# Patient Record
Sex: FEMALE | Race: WHITE | NOT HISPANIC OR LATINO | Employment: OTHER | URBAN - METROPOLITAN AREA
[De-identification: names, ages, dates, MRNs, and addresses within clinical notes are randomized per-mention and may not be internally consistent; named-entity substitution may affect disease eponyms.]

---

## 2017-03-24 PROBLEM — T38.0X5A IMMUNOCOMPROMISED DUE TO CORTICOSTEROIDS: Status: ACTIVE | Noted: 2017-03-24

## 2017-03-24 PROBLEM — Z79.52 IMMUNOCOMPROMISED DUE TO CORTICOSTEROIDS: Status: ACTIVE | Noted: 2017-03-24

## 2017-03-24 PROBLEM — B02.9 HERPES ZOSTER WITHOUT COMPLICATION: Status: ACTIVE | Noted: 2017-03-24

## 2017-03-24 PROBLEM — M79.602 LEFT ARM PAIN: Status: ACTIVE | Noted: 2017-03-24

## 2017-03-24 PROBLEM — D84.821 IMMUNOCOMPROMISED DUE TO CORTICOSTEROIDS: Status: ACTIVE | Noted: 2017-03-24

## 2017-07-05 PROBLEM — S92.909A FRACTURE, FOOT: Status: ACTIVE | Noted: 2017-05-01

## 2017-07-05 PROBLEM — D84.821 IMMUNOCOMPROMISED DUE TO CORTICOSTEROIDS: Status: RESOLVED | Noted: 2017-03-24 | Resolved: 2017-07-05

## 2017-07-05 PROBLEM — T38.0X5A IMMUNOCOMPROMISED DUE TO CORTICOSTEROIDS: Status: RESOLVED | Noted: 2017-03-24 | Resolved: 2017-07-05

## 2017-07-05 PROBLEM — J30.89 PERENNIAL ALLERGIC RHINITIS WITH SEASONAL VARIATION: Status: ACTIVE | Noted: 2017-07-05

## 2017-07-05 PROBLEM — B02.9 HERPES ZOSTER WITHOUT COMPLICATION: Status: RESOLVED | Noted: 2017-03-24 | Resolved: 2017-07-05

## 2017-07-05 PROBLEM — Z79.52 IMMUNOCOMPROMISED DUE TO CORTICOSTEROIDS: Status: RESOLVED | Noted: 2017-03-24 | Resolved: 2017-07-05

## 2017-07-05 PROBLEM — J30.2 PERENNIAL ALLERGIC RHINITIS WITH SEASONAL VARIATION: Status: ACTIVE | Noted: 2017-07-05

## 2017-08-04 ENCOUNTER — TELEPHONE (OUTPATIENT)
Dept: ORTHOPEDICS | Facility: CLINIC | Age: 62
End: 2017-08-04

## 2017-08-04 NOTE — TELEPHONE ENCOUNTER
----- Message from Edwige Turcios sent at 8/4/2017  3:06 PM CDT -----  Contact: self  Patient is returning call regarding appointment.  Please call patient at [phone].  Thanks!

## 2017-08-04 NOTE — TELEPHONE ENCOUNTER
----- Message from Jasen Cartwright sent at 8/4/2017  2:21 PM CDT -----  Contact: Aye  Patient states she needs surgery and was referred from Dr. Merary Arriaga Podiatrist. She has has a fractured foot since April. Dr. Arriaga is faxing records and reports, but she does have disc as well. Please call [phone] cell or office [phone]!

## 2017-08-08 ENCOUNTER — OFFICE VISIT (OUTPATIENT)
Dept: ORTHOPEDICS | Facility: CLINIC | Age: 62
End: 2017-08-08
Payer: COMMERCIAL

## 2017-08-08 VITALS
DIASTOLIC BLOOD PRESSURE: 82 MMHG | SYSTOLIC BLOOD PRESSURE: 158 MMHG | WEIGHT: 209 LBS | HEART RATE: 56 BPM | BODY MASS INDEX: 32.8 KG/M2 | HEIGHT: 67 IN

## 2017-08-08 DIAGNOSIS — M20.11 HALLUX VALGUS OF RIGHT FOOT: ICD-10-CM

## 2017-08-08 DIAGNOSIS — S99.921A PLANTAR PLATE INJURY, RIGHT, INITIAL ENCOUNTER: Primary | ICD-10-CM

## 2017-08-08 DIAGNOSIS — M77.41 METATARSALGIA OF RIGHT FOOT: ICD-10-CM

## 2017-08-08 PROCEDURE — 99999 PR PBB SHADOW E&M-EST. PATIENT-LVL III: CPT | Mod: PBBFAC,,, | Performed by: ORTHOPAEDIC SURGERY

## 2017-08-08 PROCEDURE — 99244 OFF/OP CNSLTJ NEW/EST MOD 40: CPT | Mod: S$GLB,,, | Performed by: ORTHOPAEDIC SURGERY

## 2017-08-08 NOTE — LETTER
August 15, 2017        Camila Arriaga, JUSTYN  [address]  [facility]  [facility] [address]             [facility]  [address] [facility] [address]  [facility] [address]  Phone: [phone]   Patient: Aye Montanez   MR Number: [id number]   YOB: 1955   Date of Visit: 8/8/2017       Dear Dr. Arriaga:    Thank you for referring Aye Montanez to me for evaluation. Attached you will find relevant portions of my assessment and plan of care.    If you have questions, please do not hesitate to call me. I look forward to following Aye Montanez along with you.    Sincerely,      Agapito Her MD            CC  No Recipients    Enclosure

## 2017-08-08 NOTE — Clinical Note
August 15, 2017      Iona Arriaga MD  [address] [facility] [address]           [facility]  [address] [facility] [address] [place] [address]  Phone: [phone]          Patient: Aye Montanez   MR Number: [id number]   YOB: 1955   Date of Visit: 8/8/2017       Dear Dr. Iona Arriaga:    Thank you for referring Aye Montanez to me for evaluation. Attached you will find relevant portions of my assessment and plan of care.    If you have questions, please do not hesitate to call me. I look forward to following Aye Montanez along with you.    Sincerely,    Agapito Her MD    Enclosure  CC:  No Recipients    If you would like to receive this communication electronically, please contact [email or web address] or [phone] to request more information on [employer] access.    For providers and/or their staff who would like to refer a patient to [facility], please contact us through our one-stop-shop provider referral line, [facility], at [phone].    If you feel you have received this communication in error or would no longer like to receive these types of communications, please e-mail [email or web address]

## 2017-08-09 ENCOUNTER — TELEPHONE (OUTPATIENT)
Dept: ORTHOPEDICS | Facility: CLINIC | Age: 62
End: 2017-08-09

## 2017-08-09 NOTE — TELEPHONE ENCOUNTER
----- Message from Jelena Lugo sent at 8/9/2017  9:03 AM CDT -----  Contact: self [phone]  States that she is calling to let nurse know that she would like to schedule surgery on Wednesday 08/16/17. States that she would like to speak to nurse regarding some questions that she has regarding the surgery. Please call back at [phone]//thank you acc

## 2017-08-10 RX ORDER — MUPIROCIN 20 MG/G
1 OINTMENT TOPICAL
Status: CANCELLED | OUTPATIENT
Start: 2017-08-10

## 2017-08-15 ENCOUNTER — ANESTHESIA EVENT (OUTPATIENT)
Dept: SURGERY | Facility: HOSPITAL | Age: 62
End: 2017-08-15
Payer: COMMERCIAL

## 2017-08-15 PROBLEM — M20.11 HALLUX VALGUS OF RIGHT FOOT: Status: ACTIVE | Noted: 2017-08-15

## 2017-08-15 PROBLEM — S99.921A INJURY OF PLANTAR PLATE OF RIGHT FOOT: Status: ACTIVE | Noted: 2017-05-01

## 2017-08-15 NOTE — PROGRESS NOTES
"HPI: Aye Montanez is a 62 y.o. female who was referred to me by Dr. Merary Arriaga and was seen in consultation today for right foot pain. She injured the foot in April 2017. She saw Dr. Sandoval for this previously. She rates her pain as 7/10 today and worse with shoe wear and walking Pt denies weakness, numbness, and tingling.       PAST MEDICAL/SURGICAL/FAMILY/SOCIAL/ HISTORY: REVIEWED    ALLERGIES/MEDICATIONS: REVIEWED       Review of Systems:     Constitution: Negative.   HEENT: Negative.   Eyes: Negative.   Cardiovascular: Negative.   Respiratory: Negative.   Endocrine: Negative.   Hematologic/Lymphatic: Negative.   Skin: Negative.   Musculoskeletal: Positive for right foot pain   Gastrointestinal: Negative.   Genitourinary: Negative.   Neurological: Negative.   Psychiatric/Behavioral: Negative.   Allergic/Immunologic: Negative.       PHYSICAL EXAM:  Vitals:    08/08/17 1509   BP: (!) 158/82   Pulse: (!) 56     Ht Readings from Last 1 Encounters:   08/08/17 5' 7" (1.702 m)     Wt Readings from Last 1 Encounters:   08/08/17 94.8 kg (209 lb)         GENERAL: Well developed, well nourished, no acute distress.  SKIN: Skin is intact. No atrophy, abrasions or lesions are noted.   Neurological: Normal mental status. Appropriate and conversant. Alert and oriented x 3.  GAIT: Walks with non-antalgic gait.    Right lower extremity:  2+ dorsalis pedis pulse.  Capillary refill < 3 seconds.  Normal range of motion tibiotalar and subtalar joints. Normal alignment of the forefoot and the hindfoot.  5/5 strength EHL, FHL, tibialis anterior, gastrocsoleus, tibialis posterior and peroneals. Sensation to light touch intact sural, saphenous, superficial peroneal and deep peroneal nerves. No swelling, ecchymosis. 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 Very tender to palpation at the 2nd metatarsalphalangeal joint. Mild hallux valgus.  No lymphadenopathy, no masses or tumors palpated.   " tenderness to palpation under 2nd metatarsal head.       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    Plantar plate injury, right, initial encounter Yes    Metatarsalgia of right foot     Hallux valgus of right foot         PLAN:  I spent 25 minutes in consulation with the patient today. More than half the time was spent counseling the patient on her condition and the options for operative versus non-operative care.  We discussed right 2nd toe plantar plate repair, hammertoe correction, and distal chevron osteotomy of the great toe. She is going to think about surgery and f/u when ready.

## 2017-08-16 ENCOUNTER — HOSPITAL ENCOUNTER (OUTPATIENT)
Facility: HOSPITAL | Age: 62
Discharge: HOME | End: 2017-08-16
Attending: ORTHOPAEDIC SURGERY | Admitting: ORTHOPAEDIC SURGERY
Payer: COMMERCIAL

## 2017-08-16 ENCOUNTER — HOSPITAL ENCOUNTER (OUTPATIENT)
Dept: RADIOLOGY | Facility: HOSPITAL | Age: 62
Discharge: HOME | End: 2017-08-16
Attending: ORTHOPAEDIC SURGERY | Admitting: ORTHOPAEDIC SURGERY
Payer: COMMERCIAL

## 2017-08-16 ENCOUNTER — ANESTHESIA (OUTPATIENT)
Dept: SURGERY | Facility: HOSPITAL | Age: 62
End: 2017-08-16
Payer: COMMERCIAL

## 2017-08-16 DIAGNOSIS — M77.41 METATARSALGIA OF RIGHT FOOT: ICD-10-CM

## 2017-08-16 DIAGNOSIS — S99.921A PLANTAR PLATE INJURY, RIGHT, INITIAL ENCOUNTER: Primary | ICD-10-CM

## 2017-08-16 DIAGNOSIS — M20.11 HALLUX VALGUS OF RIGHT FOOT: ICD-10-CM

## 2017-08-16 DIAGNOSIS — M79.671 RIGHT FOOT PAIN: ICD-10-CM

## 2017-08-16 PROBLEM — S99.929A PLANTAR PLATE INJURY: Status: ACTIVE | Noted: 2017-08-16

## 2017-08-16 PROCEDURE — 63600175 PHARM REV CODE 636 W HCPCS: Mod: PO | Performed by: ANESTHESIOLOGY

## 2017-08-16 PROCEDURE — 27200651 HC AIRWAY, LMA: Mod: PO | Performed by: NURSE ANESTHETIST, CERTIFIED REGISTERED

## 2017-08-16 PROCEDURE — 36000709 HC OR TIME LEV III EA ADD 15 MIN: Mod: PO | Performed by: ORTHOPAEDIC SURGERY

## 2017-08-16 PROCEDURE — 37000009 HC ANESTHESIA EA ADD 15 MINS: Mod: PO | Performed by: ORTHOPAEDIC SURGERY

## 2017-08-16 PROCEDURE — 63600175 PHARM REV CODE 636 W HCPCS: Mod: PO | Performed by: NURSE ANESTHETIST, CERTIFIED REGISTERED

## 2017-08-16 PROCEDURE — D9220A PRA ANESTHESIA: Mod: CRNA,,, | Performed by: NURSE ANESTHETIST, CERTIFIED REGISTERED

## 2017-08-16 PROCEDURE — 27201423 OPTIME MED/SURG SUP & DEVICES STERILE SUPPLY: Mod: PO | Performed by: ORTHOPAEDIC SURGERY

## 2017-08-16 PROCEDURE — 71000033 HC RECOVERY, INTIAL HOUR: Mod: PO | Performed by: ORTHOPAEDIC SURGERY

## 2017-08-16 PROCEDURE — 37000008 HC ANESTHESIA 1ST 15 MINUTES: Mod: PO | Performed by: ORTHOPAEDIC SURGERY

## 2017-08-16 PROCEDURE — 28645 REPAIR TOE DISLOCATION: CPT | Mod: 59,RT,, | Performed by: ORTHOPAEDIC SURGERY

## 2017-08-16 PROCEDURE — 28285 REPAIR OF HAMMERTOE: CPT | Mod: 51,RT,, | Performed by: ORTHOPAEDIC SURGERY

## 2017-08-16 PROCEDURE — 71000039 HC RECOVERY, EACH ADD'L HOUR: Mod: PO | Performed by: ORTHOPAEDIC SURGERY

## 2017-08-16 PROCEDURE — C1713 ANCHOR/SCREW BN/BN,TIS/BN: HCPCS | Mod: PO | Performed by: ORTHOPAEDIC SURGERY

## 2017-08-16 PROCEDURE — D9220A PRA ANESTHESIA: Mod: ANES,,, | Performed by: ANESTHESIOLOGY

## 2017-08-16 PROCEDURE — 28308 INCISION OF METATARSAL: CPT | Mod: 51,RT,, | Performed by: ORTHOPAEDIC SURGERY

## 2017-08-16 PROCEDURE — 28313 REPAIR DEFORMITY OF TOE: CPT | Mod: 51,RT,, | Performed by: ORTHOPAEDIC SURGERY

## 2017-08-16 PROCEDURE — 25000003 PHARM REV CODE 250: Mod: PO | Performed by: ANESTHESIOLOGY

## 2017-08-16 PROCEDURE — 25000003 PHARM REV CODE 250: Mod: PO | Performed by: ORTHOPAEDIC SURGERY

## 2017-08-16 PROCEDURE — 36000708 HC OR TIME LEV III 1ST 15 MIN: Mod: PO | Performed by: ORTHOPAEDIC SURGERY

## 2017-08-16 PROCEDURE — S0077 INJECTION, CLINDAMYCIN PHOSP: HCPCS | Mod: PO | Performed by: ORTHOPAEDIC SURGERY

## 2017-08-16 PROCEDURE — 76000 FLUOROSCOPY <1 HR PHYS/QHP: CPT | Mod: TC,PO

## 2017-08-16 PROCEDURE — C1769 GUIDE WIRE: HCPCS | Mod: PO | Performed by: ORTHOPAEDIC SURGERY

## 2017-08-16 PROCEDURE — 28296 COR HLX VLGS DSTL MTAR OSTEO: CPT | Mod: RT,,, | Performed by: ORTHOPAEDIC SURGERY

## 2017-08-16 PROCEDURE — S0020 INJECTION, BUPIVICAINE HYDRO: HCPCS | Mod: PO | Performed by: ORTHOPAEDIC SURGERY

## 2017-08-16 DEVICE — IMPLANTABLE DEVICE: Type: IMPLANTABLE DEVICE | Site: FOOT | Status: FUNCTIONAL

## 2017-08-16 DEVICE — SYS IMPLANT CPR VIPER: Type: IMPLANTABLE DEVICE | Site: FOOT | Status: FUNCTIONAL

## 2017-08-16 RX ORDER — MEPERIDINE HYDROCHLORIDE 50 MG/ML
12.5 INJECTION INTRAMUSCULAR; INTRAVENOUS; SUBCUTANEOUS ONCE AS NEEDED
Status: COMPLETED | OUTPATIENT
Start: 2017-08-16 | End: 2017-08-16

## 2017-08-16 RX ORDER — MUPIROCIN 20 MG/G
OINTMENT TOPICAL
Status: DISCONTINUED | OUTPATIENT
Start: 2017-08-16 | End: 2017-08-16 | Stop reason: HOSPADM

## 2017-08-16 RX ORDER — LIDOCAINE HYDROCHLORIDE 10 MG/ML
1 INJECTION, SOLUTION EPIDURAL; INFILTRATION; INTRACAUDAL; PERINEURAL ONCE AS NEEDED
Status: DISCONTINUED | OUTPATIENT
Start: 2017-08-16 | End: 2017-08-16 | Stop reason: HOSPADM

## 2017-08-16 RX ORDER — MEPERIDINE HYDROCHLORIDE 50 MG/ML
12.5 INJECTION INTRAMUSCULAR; INTRAVENOUS; SUBCUTANEOUS ONCE AS NEEDED
Status: DISCONTINUED | OUTPATIENT
Start: 2017-08-16 | End: 2017-08-16 | Stop reason: HOSPADM

## 2017-08-16 RX ORDER — FERROUS SULFATE, DRIED 160(50) MG
1 TABLET, EXTENDED RELEASE ORAL DAILY
COMMUNITY
End: 2022-01-12

## 2017-08-16 RX ORDER — ONDANSETRON 2 MG/ML
INJECTION INTRAMUSCULAR; INTRAVENOUS
Status: DISCONTINUED | OUTPATIENT
Start: 2017-08-16 | End: 2017-08-16

## 2017-08-16 RX ORDER — ACETAMINOPHEN 500 MG
1000 TABLET ORAL EVERY 6 HOURS PRN
COMMUNITY

## 2017-08-16 RX ORDER — LIDOCAINE HYDROCHLORIDE 20 MG/ML
INJECTION, SOLUTION INFILTRATION; PERINEURAL
Status: DISCONTINUED | OUTPATIENT
Start: 2017-08-16 | End: 2017-08-16 | Stop reason: HOSPADM

## 2017-08-16 RX ORDER — HYDROCODONE BITARTRATE AND ACETAMINOPHEN 10; 325 MG/1; MG/1
1 TABLET ORAL EVERY 4 HOURS PRN
Qty: 42 TABLET | Refills: 0 | Status: SHIPPED | OUTPATIENT
Start: 2017-08-16 | End: 2017-09-01 | Stop reason: SDUPTHER

## 2017-08-16 RX ORDER — MIDAZOLAM HYDROCHLORIDE 1 MG/ML
INJECTION, SOLUTION INTRAMUSCULAR; INTRAVENOUS
Status: DISCONTINUED | OUTPATIENT
Start: 2017-08-16 | End: 2017-08-16

## 2017-08-16 RX ORDER — BUPIVACAINE HYDROCHLORIDE 5 MG/ML
INJECTION, SOLUTION EPIDURAL; INTRACAUDAL
Status: DISCONTINUED | OUTPATIENT
Start: 2017-08-16 | End: 2017-08-16 | Stop reason: HOSPADM

## 2017-08-16 RX ORDER — PROPOFOL 10 MG/ML
VIAL (ML) INTRAVENOUS
Status: DISCONTINUED | OUTPATIENT
Start: 2017-08-16 | End: 2017-08-16

## 2017-08-16 RX ORDER — DEXAMETHASONE SODIUM PHOSPHATE 4 MG/ML
8 INJECTION, SOLUTION INTRA-ARTICULAR; INTRALESIONAL; INTRAMUSCULAR; INTRAVENOUS; SOFT TISSUE
Status: COMPLETED | OUTPATIENT
Start: 2017-08-16 | End: 2017-08-16

## 2017-08-16 RX ORDER — FENTANYL CITRATE 50 UG/ML
INJECTION, SOLUTION INTRAMUSCULAR; INTRAVENOUS
Status: DISCONTINUED | OUTPATIENT
Start: 2017-08-16 | End: 2017-08-16

## 2017-08-16 RX ORDER — OXYCODONE HYDROCHLORIDE 5 MG/1
5 TABLET ORAL ONCE AS NEEDED
Status: DISCONTINUED | OUTPATIENT
Start: 2017-08-17 | End: 2017-08-16 | Stop reason: HOSPADM

## 2017-08-16 RX ORDER — SODIUM CHLORIDE 0.9 % (FLUSH) 0.9 %
3 SYRINGE (ML) INJECTION
Status: DISCONTINUED | OUTPATIENT
Start: 2017-08-16 | End: 2017-08-16 | Stop reason: HOSPADM

## 2017-08-16 RX ORDER — MUPIROCIN 20 MG/G
1 OINTMENT TOPICAL
Status: COMPLETED | OUTPATIENT
Start: 2017-08-16 | End: 2017-08-16

## 2017-08-16 RX ORDER — HYDROMORPHONE HYDROCHLORIDE 2 MG/ML
0.2 INJECTION, SOLUTION INTRAMUSCULAR; INTRAVENOUS; SUBCUTANEOUS EVERY 5 MIN PRN
Status: DISCONTINUED | OUTPATIENT
Start: 2017-08-16 | End: 2017-08-16 | Stop reason: HOSPADM

## 2017-08-16 RX ORDER — ONDANSETRON 4 MG/1
8 TABLET, ORALLY DISINTEGRATING ORAL EVERY 8 HOURS PRN
Qty: 20 TABLET | Refills: 1 | Status: SHIPPED | OUTPATIENT
Start: 2017-08-16 | End: 2017-10-20

## 2017-08-16 RX ORDER — ACETAMINOPHEN 10 MG/ML
INJECTION, SOLUTION INTRAVENOUS
Status: DISCONTINUED | OUTPATIENT
Start: 2017-08-16 | End: 2017-08-16

## 2017-08-16 RX ORDER — SODIUM CHLORIDE, SODIUM LACTATE, POTASSIUM CHLORIDE, CALCIUM CHLORIDE 600; 310; 30; 20 MG/100ML; MG/100ML; MG/100ML; MG/100ML
INJECTION, SOLUTION INTRAVENOUS CONTINUOUS
Status: DISCONTINUED | OUTPATIENT
Start: 2017-08-16 | End: 2017-08-16 | Stop reason: HOSPADM

## 2017-08-16 RX ORDER — FENTANYL CITRATE 50 UG/ML
25 INJECTION, SOLUTION INTRAMUSCULAR; INTRAVENOUS EVERY 5 MIN PRN
Status: DISCONTINUED | OUTPATIENT
Start: 2017-08-16 | End: 2017-08-16 | Stop reason: HOSPADM

## 2017-08-16 RX ORDER — LIDOCAINE HCL/PF 100 MG/5ML
SYRINGE (ML) INTRAVENOUS
Status: DISCONTINUED | OUTPATIENT
Start: 2017-08-16 | End: 2017-08-16

## 2017-08-16 RX ORDER — DIPHENHYDRAMINE HYDROCHLORIDE 50 MG/ML
25 INJECTION INTRAMUSCULAR; INTRAVENOUS EVERY 6 HOURS PRN
Status: DISCONTINUED | OUTPATIENT
Start: 2017-08-16 | End: 2017-08-16 | Stop reason: HOSPADM

## 2017-08-16 RX ADMIN — DEXAMETHASONE SODIUM PHOSPHATE 8 MG: 4 INJECTION, SOLUTION INTRAMUSCULAR; INTRAVENOUS at 11:08

## 2017-08-16 RX ADMIN — LIDOCAINE HYDROCHLORIDE 75 MG: 20 INJECTION PARENTERAL at 12:08

## 2017-08-16 RX ADMIN — FENTANYL CITRATE 50 MCG: 50 INJECTION, SOLUTION INTRAMUSCULAR; INTRAVENOUS at 12:08

## 2017-08-16 RX ADMIN — SODIUM CHLORIDE, SODIUM LACTATE, POTASSIUM CHLORIDE, AND CALCIUM CHLORIDE: .6; .31; .03; .02 INJECTION, SOLUTION INTRAVENOUS at 11:08

## 2017-08-16 RX ADMIN — DEXTROSE 900 MG: 50 INJECTION, SOLUTION INTRAVENOUS at 12:08

## 2017-08-16 RX ADMIN — PROPOFOL 190 MG: 10 INJECTION, EMULSION INTRAVENOUS at 12:08

## 2017-08-16 RX ADMIN — ONDANSETRON 4 MG: 2 INJECTION, SOLUTION INTRAMUSCULAR; INTRAVENOUS at 01:08

## 2017-08-16 RX ADMIN — MEPERIDINE HYDROCHLORIDE 12.5 MG: 50 INJECTION INTRAMUSCULAR; INTRAVENOUS; SUBCUTANEOUS at 03:08

## 2017-08-16 RX ADMIN — ACETAMINOPHEN 1000 MG: 10 INJECTION, SOLUTION INTRAVENOUS at 12:08

## 2017-08-16 RX ADMIN — MUPIROCIN 1 G: 20 OINTMENT TOPICAL at 11:08

## 2017-08-16 NOTE — BRIEF OP NOTE
DATE:  8/16/2017   TIME: 2:10 PM      PATIENT NAME: Aye Montanez     PRE-OPERATIVE DIAGNOSIS: 1) Right 2nd metatarsalgia  2) Right 2nd toe hammertoe 3) Right 2nd toe plantar plate rupture 4) Right 2nd toe  metatarsalphalangeal joint dislocation 5) Right hallux valgus     POST-OPERATIVE DIAGNOSIS:  1) Right 2nd metatarsalgia  2) Right 2nd toe hammertoe 3) Right 2nd toe plantar plate rupture 4) Right 2nd toe  metatarsalphalangeal joint dislocation 5) Right hallux valgus     PROCEDURE: 1)  Right 2nd metatarsal weil osteotomy 2) Right 2nd toe hammertoe correction 3) Open reduction internal fixation of right 2nd toe  metatarsalphalangeal joint dislocation 4) Repair Right 2nd toe plantar plate rupture 5) Right 1st metatarsal distal chevron osteotomy with bunionectomy      SURGEON: Agapito Her MD     ANESTHESIA TYPE: Ankle block and LMA.     SPECIMENS SENT: NONE      COMPLICATIONS: NONE      BLOOD LOSS: < 5 cc     ASSISTANT: EDUARDO Ferrell

## 2017-08-16 NOTE — TRANSFER OF CARE
"Anesthesia Transfer of Care Note    Patient: Aye Montanez    Procedure(s) Performed: Procedure(s) (LRB):  REPAIR-HAMMER TOE (2nd) (Right)  OSTEOTOMY-CHEVRON (1st) (Right)  BUNIONECTOMY (Right)  RECONSTRUCTION-PLANTAR-TOE (2nd) (Right)  OSTEOTOMY-METATARSAL (2nd weil) (Right)    Patient location: PACU    Anesthesia Type: general    Transport from OR: Transported from OR on room air with adequate spontaneous ventilation    Post pain: adequate analgesia    Post assessment: no apparent anesthetic complications and tolerated procedure well    Post vital signs: stable    Level of consciousness: awake and alert    Nausea/Vomiting: no nausea/vomiting    Complications: none    Transfer of care protocol was followed      Last vitals:   Visit Vitals  Ht 5' 7" (1.702 m)   Wt 93.4 kg (206 lb)   Breastfeeding? No   BMI 32.26 kg/m²     "

## 2017-08-16 NOTE — OP NOTE
DATE:  8/16/2017   TIME: 2:10 PM      PATIENT NAME: Aye Montanez     PRE-OPERATIVE DIAGNOSIS: 1) Right 2nd metatarsalgia  2) Right 2nd toe hammertoe 3) Right 2nd toe plantar plate rupture 4) Right 2nd toe  metatarsalphalangeal joint dislocation 5) Right hallux valgus     POST-OPERATIVE DIAGNOSIS:  1) Right 2nd metatarsalgia  2) Right 2nd toe hammertoe 3) Right 2nd toe plantar plate rupture 4) Right 2nd toe  metatarsalphalangeal joint dislocation 5) Right hallux valgus     PROCEDURE: 1)  Right 2nd metatarsal weil osteotomy 2) Right 2nd toe hammertoe correction 3) Open reduction internal fixation of right 2nd toe  metatarsalphalangeal joint dislocation 4) Repair Right 2nd toe plantar plate rupture 5) Right 1st metatarsal distal chevron osteotomy with bunionectomy      SURGEON: Agapito Her MD     ANESTHESIA TYPE: Ankle block and LMA.     SPECIMENS SENT: NONE      COMPLICATIONS: NONE      BLOOD LOSS: < 5 cc     ASSISTANT: EDUARDO Ferrell     Procedure in detail: After appropriate informed consent was obtained the patient was taken to the OR and placed in the supine position on the operating table. The right lower extremity was prepped and draped in the usual sterile fashion. Tourniquet was raised to 300 mmHg after esmarch exsanguination of the limb.  A 5-cm incision was made longitudinally using a #15 blade overlying the dorsum of the 2nd webpsace.    Next  extensor digitorum longus tendon was lengthening in a Z-type fashion.  Dorsal capsulotomy was performed at the 2nd  metatarsalphalangeal joint which was dislocated dorsally. Weil osteotomy was performed at the 2nd metatarsal neck using a sagittal saw at a 60 degree angle and completed with an osteotome. The 2nd metatarsal neck was shortened and Integra 14 mm x 2.0 mm break away screw was used for fixation of the osteotomy.  A Mcglamry retractor was used to elevate the plantar soft tissues and expose the plantar plate. There as a large complete full  thickness, horizontal tear of the plantar plate of the 2nd  metatarsalphalangeal joint.    Next two 1.6 k-wires were placed. One was placed in the metatarsal neck and one was placed in the proximal phalanx.  The pin distractor was then used to distract the joint and visualize the flexor tendons and the plantar plate for repair.  The Arthrex Viper suture passer was used to pass two 2.0 fiber wire sutures through the medial and lateral ends of the plantar plate.  Next two small drill holes were made in the base of the proximal phalanx using a 0.062 k-wire. Then the sutures were crossed over pulled through one from each side.       Next a separate skin incision was made over the dorsum of the 2nd toe.  The Proximal interphalangeal joint was exposed and the extensor tendon was split longitudinally. Proximal interphalangeal joint resection arthroplasty was performed using a sagittal saw.  A medium  [employer] toe tac was used to stabilize the PIPJ resection arthroplasty. The proximal interphalangeal joint was pinned across the 2nd metatarsalphalangeal joint using a 0.045 K-wire in a retrograde fashion in order to reduce the dislocated 2nd metatarso-phalangeal joint. There was very good alignment of the joints and good position of wire on AP/LAT/Oblique views of the foot under fluoroscopy.  The K-wire was cut and jurgan's ball was placed.   Next the sutures were tied down at the dorsum of the proximal phalanx to complete the plantar plate repair.   Attention was turned to the great toe which was rubbing the 2nd toe due to hallux valgus deformity.  A 4 cm incision was made over the dorsomedial aspect of  the great toe. The medial eminence was removed using sagittal saw. The osteotomy level was marked and distal chevron osteotomy was performed using a sagittal saw and 1/4 inch osteotome. One Arthrex compression screw was placed across the osteotomy with good compression.      There was  good alignment correction of the  deformities and cascade of the metatarsals and good position of the hardware on AP/LAT/Oblique views of the foot under fluoroscopy.    The capsule of the joint of the 2nd  metatarsalphalangeal joint was repaired using 2.0 fiberwire interrupted sutures.    The incision was irrigated with normal saline. The tourniquet was let down, adequate hemostasis was achieved using bovie cautery. The deep layer was re-approximated using 3.0-monocyrl in an interrupted fashion. The subcutaneous layer was re-approximated using 3.0-monocyrl in an interrupted fashion. The skin incisions were re-approximated using 3.0 nylon in a running fashion. Sterile dressing using xeroform, bacitracin, 4x8s, ABD, cast padding, posterior splint and ace wrap were placed. Patient tolerated the procedure well without complications.  I was present and scrubbed for the entire case.

## 2017-08-16 NOTE — DISCHARGE INSTRUCTIONS
FOOT SURGERY  After surgery:    DOS:   Keep leg elevated until first post operative visit   Keep dressing clean and dry DO NOT CHANGE BANDAGES   Advanced diet as tolerated.    Check circulation frequently in toes by pressing down on toenail. Nail should turn white and then pink when released.   May walk on foot to go to the bathroom only DO NOT WALK WITHOUT SHOE   Wear surgical shoe. May remove shoe to sleep.   Resume home medications    DONT:   Do not remove your dressing   Do not get dressing wet.   No driving until released by MD   DO NOT TAKE ADDITIONAL TYLENOL/ACETAMINOPHEN WHILE TAKING NARCOTIC PAIN MEDICATION THAT CONTAINS TYLENOL/ACETAMINOPHEN.    CALL PHYSICIAN FOR:   Pain, burning, or numbness of the toes not relieved by elevation of the leg.   Pale or cold toes; bluish nail beds.   Redness, swelling, or bleeding.   Fever> 101   Drainage (pus) from the puncture sites   Pain unrelieved by pain medication    FOR EMERGENCIES CONTACT YOUR PHYSICIAN -[phone]

## 2017-08-16 NOTE — H&P
"HPI: Aye Montanez is a 62 y.o. female who was referred to me by Dr. Merary Arriaga and was seen in consultation today for right foot pain. She injured the foot in April 2017. She saw Dr. Sandoval for this previously. She rates her pain as 7/10 today and worse with shoe wear and walking Pt denies weakness, numbness, and tingling.        PAST MEDICAL/SURGICAL/FAMILY/SOCIAL/ HISTORY: REVIEWED    ALLERGIES/MEDICATIONS: REVIEWED         Review of Systems:     Constitution: Negative.   HEENT: Negative.   Eyes: Negative.   Cardiovascular: Negative.   Respiratory: Negative.   Endocrine: Negative.   Hematologic/Lymphatic: Negative.   Skin: Negative.   Musculoskeletal: Positive for right foot pain   Gastrointestinal: Negative.   Genitourinary: Negative.   Neurological: Negative.   Psychiatric/Behavioral: Negative.   Allergic/Immunologic: Negative.         PHYSICAL EXAM:      Vitals:     08/08/17 1509   BP: (!) 158/82   Pulse: (!) 56          Ht Readings from Last 1 Encounters:   08/08/17 5' 7" (1.702 m)          Wt Readings from Last 1 Encounters:   08/08/17 94.8 kg (209 lb)            GENERAL: Well developed, well nourished, no acute distress.  SKIN: Skin is intact. No atrophy, abrasions or lesions are noted.   Neurological: Normal mental status. Appropriate and conversant. Alert and oriented x 3.  GAIT: Walks with non-antalgic gait.     Right lower extremity:  2+ dorsalis pedis pulse.  Capillary refill < 3 seconds.  Normal range of motion tibiotalar and subtalar joints. Normal alignment of the forefoot and the hindfoot.  5/5 strength EHL, FHL, tibialis anterior, gastrocsoleus, tibialis posterior and peroneals. Sensation to light touch intact sural, saphenous, superficial peroneal and deep peroneal nerves. No swelling, ecchymosis. 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 Very tender to palpation at the 2nd metatarsalphalangeal joint. Mild hallux valgus.  No lymphadenopathy, no " masses or tumors palpated.   tenderness to palpation under 2nd metatarsal head.         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    Plantar plate injury, right, initial encounter Yes    Metatarsalgia of right foot      Hallux valgus of right foot          PLAN:  I spent 25 minutes in consulation with the patient today. More than half the time was spent counseling the patient on her condition and the options for operative versus non-operative care.  We discussed right 2nd toe plantar plate repair, hammertoe correction, and distal chevron osteotomy of the great toe. She is going to think about surgery and f/u when ready.

## 2017-08-16 NOTE — ANESTHESIA POSTPROCEDURE EVALUATION
"Anesthesia Post Evaluation    Patient: Aye Montanez    Procedure(s) Performed: Procedure(s) (LRB):  REPAIR-HAMMER TOE (2nd) (Right)  OSTEOTOMY-CHEVRON (1st) (Right)  BUNIONECTOMY (Right)  RECONSTRUCTION-PLANTAR-TOE (2nd) (Right)  OSTEOTOMY-METATARSAL (2nd weil) (Right)    Final Anesthesia Type: general  Patient location during evaluation: PACU  Patient participation: Yes- Able to Participate  Level of consciousness: awake and alert and oriented  Post-procedure vital signs: reviewed and stable  Pain management: adequate  Airway patency: patent  PONV status at discharge: No PONV  Anesthetic complications: no      Cardiovascular status: blood pressure returned to baseline  Respiratory status: unassisted, spontaneous ventilation and room air  Hydration status: euvolemic  Follow-up not needed.        Visit Vitals  BP (!) 187/77   Pulse 88   Temp 36.1 °C (97 °F) (Temporal)   Resp 18   Ht 5' 7" (1.702 m)   Wt 93.4 kg (206 lb)   SpO2 98%   Breastfeeding? No   BMI 32.26 kg/m²       Pain/Gigi Score: Pain Assessment Performed: Yes (8/16/2017  2:33 PM)  Presence of Pain: complains of pain/discomfort (8/16/2017  3:11 PM)  Pain Rating Prior to Med Admin: 10 (8/16/2017  3:11 PM)  Gigi Score: 8 (8/16/2017  2:33 PM)      "

## 2017-08-16 NOTE — ANESTHESIA PREPROCEDURE EVALUATION
08/16/2017  Aye Montanez is a 62 y.o., female.    Anesthesia Evaluation    I have reviewed the Patient Summary Reports.    I have reviewed the Nursing Notes.   I have reviewed the Medications.     Review of Systems  Anesthesia Hx:  No problems with previous Anesthesia Denies Hx of Anesthetic complications  Personal Hx of Anesthesia complications  Paradoxical Response To Benzodiazapines (NO VERSED)   Social:  Non-Smoker    Cardiovascular:   Denies Hypertension.  Denies MI.  Denies CAD.    Denies CABG/stent.   Denies Angina. SANDOVAL    Pulmonary:   Denies COPD.  Denies Asthma. Shortness of breath  Denies Recent URI.    Renal/:   Denies Chronic Renal Disease.     Hepatic/GI:   Hiatal Hernia, Denies GERD. Denies Liver Disease.    Neurological:   Denies TIA. Denies CVA. Neuromuscular Disease,  Denies Seizures.    Endocrine:   Denies Diabetes. Denies Hypothyroidism.    Psych:   Denies Psychiatric History.          Physical Exam  General:  Obesity    Airway/Jaw/Neck:  Airway Findings: Mouth Opening: Normal Tongue: Normal  General Airway Assessment: Adult, Good  Mallampati: II  Improves to II with phonation.  TM Distance: 4-6 cm      Dental:  Dental Findings: In tact   Chest/Lungs:  Chest/Lungs Findings: Clear to auscultation, Normal Respiratory Rate     Heart/Vascular:  Heart Findings: Rate: Normal  Rhythm: Regular Rhythm  Sounds: Normal  Heart murmur: negative       Mental Status:  Mental Status Findings:  Cooperative, Alert and Oriented         Anesthesia Plan  Type of Anesthesia, risks & benefits discussed:  Anesthesia Type:  general  Patient's Preference:   Intra-op Monitoring Plan:   Intra-op Monitoring Plan Comments:   Post Op Pain Control Plan:   Post Op Pain Control Plan Comments:   Induction:   IV  Beta Blocker:  Patient is not currently on a Beta-Blocker (No further documentation required).       Informed  Consent: Patient understands risks and agrees with Anesthesia plan.  Questions answered. Anesthesia consent signed with patient.  ASA Score: 2     Day of Surgery Review of History & Physical: I have interviewed and examined the patient. I have reviewed the patient's H&P dated:  There are no significant changes.  H&P update referred to the surgeon.         Ready For Surgery From Anesthesia Perspective.

## 2017-08-17 ENCOUNTER — TELEPHONE (OUTPATIENT)
Dept: ORTHOPEDICS | Facility: CLINIC | Age: 62
End: 2017-08-17

## 2017-08-17 ENCOUNTER — NURSE TRIAGE (OUTPATIENT)
Dept: ADMINISTRATIVE | Facility: CLINIC | Age: 62
End: 2017-08-17

## 2017-08-17 VITALS
HEART RATE: 72 BPM | HEIGHT: 67 IN | DIASTOLIC BLOOD PRESSURE: 67 MMHG | OXYGEN SATURATION: 99 % | SYSTOLIC BLOOD PRESSURE: 145 MMHG | BODY MASS INDEX: 32.33 KG/M2 | RESPIRATION RATE: 18 BRPM | TEMPERATURE: 97 F | WEIGHT: 206 LBS

## 2017-08-17 NOTE — TELEPHONE ENCOUNTER
----- Message from Suzan Guerrero sent at 8/17/2017 11:47 AM CDT -----  Contact: [phone]  Patient is requesting a call back from the nurse stated her pain is not subsidizing.  Please call the patient upon request at phone number [phone].

## 2017-08-17 NOTE — TELEPHONE ENCOUNTER
"  Reason for Disposition   [1] SEVERE post-op pain (e.g., excruciating, pain scale 8-10) AND [2] not controlled with pain medications    Answer Assessment - Initial Assessment Questions  1. SYMPTOM: "What's the main symptom you're concerned about?" (e.g., pain, fever, vomiting)    Pain  2. ONSET: "When did ________  start?"      Yesterday  3. SURGERY: "What surgery was performed?"      Right foot surgery  4. DATE of SURGERY: "When was surgery performed?"      Yesterday  5. ANESTHESIA: " What type of anesthesia did you have?" (e.g., general, spinal, epidural, local)      General  6. PAIN: "Is there any pain?" If so, ask: "How bad is it?"  (Scale 1-10; or mild, moderate, severe)      Yes. 10/10. Last took Norco at 8:30a. Last took Tylenol 500 mg at 11a.  7. FEVER: "Do you have a fever?" If so, ask: "What is your temperature, how was it measured, and when did it start?"      No  8. VOMITING: "Is there any vomiting?" If yes, ask: "How many times?"      No  9. BLEEDING: "Is there any bleeding?" If so, ask: "How much?" and "Where?"      No  10. OTHER SYMPTOMS: "Do you have any other symptoms?" (e.g., drainage from wound, painful urination, constipation)        No    Protocols used: ST POST-OP SYMPTOMS AND [id number]    Patient transferred to Dr Her's office.  "

## 2017-08-17 NOTE — TELEPHONE ENCOUNTER
Spoke with pt, pt stated she is having increased pain when her foot is elevated, and is it ok to alternate tylenol inbetween the norco.Informed pt she just needs to keep foot elevated, as swelling can increase the pain. Also informed pt the first day postop is the worse day.Pt stated if she does not get any relief from pain she will call back tomorrow.

## 2017-08-30 DIAGNOSIS — M77.41 METATARSALGIA OF RIGHT FOOT: ICD-10-CM

## 2017-08-30 DIAGNOSIS — S99.921A PLANTAR PLATE INJURY, RIGHT, INITIAL ENCOUNTER: Primary | ICD-10-CM

## 2017-09-01 ENCOUNTER — HOSPITAL ENCOUNTER (OUTPATIENT)
Dept: RADIOLOGY | Facility: HOSPITAL | Age: 62
Discharge: HOME | End: 2017-09-01
Attending: ORTHOPAEDIC SURGERY
Payer: COMMERCIAL

## 2017-09-01 ENCOUNTER — OFFICE VISIT (OUTPATIENT)
Dept: ORTHOPEDICS | Facility: CLINIC | Age: 62
End: 2017-09-01
Payer: COMMERCIAL

## 2017-09-01 VITALS
WEIGHT: 206 LBS | HEIGHT: 67 IN | BODY MASS INDEX: 32.33 KG/M2 | DIASTOLIC BLOOD PRESSURE: 79 MMHG | HEART RATE: 68 BPM | SYSTOLIC BLOOD PRESSURE: 131 MMHG

## 2017-09-01 DIAGNOSIS — M20.11 HALLUX VALGUS OF RIGHT FOOT: ICD-10-CM

## 2017-09-01 DIAGNOSIS — M77.41 METATARSALGIA OF RIGHT FOOT: ICD-10-CM

## 2017-09-01 DIAGNOSIS — S99.921D INJURY OF PLANTAR PLATE OF RIGHT FOOT, SUBSEQUENT ENCOUNTER: Primary | ICD-10-CM

## 2017-09-01 DIAGNOSIS — S99.921A PLANTAR PLATE INJURY, RIGHT, INITIAL ENCOUNTER: ICD-10-CM

## 2017-09-01 PROCEDURE — 99999 PR PBB SHADOW E&M-EST. PATIENT-LVL III: CPT | Mod: PBBFAC,,, | Performed by: ORTHOPAEDIC SURGERY

## 2017-09-01 PROCEDURE — 73630 X-RAY EXAM OF FOOT: CPT | Mod: TC,PO,RT

## 2017-09-01 PROCEDURE — 73630 X-RAY EXAM OF FOOT: CPT | Mod: 26,RT,, | Performed by: RADIOLOGY

## 2017-09-01 PROCEDURE — 99024 POSTOP FOLLOW-UP VISIT: CPT | Mod: S$GLB,,, | Performed by: ORTHOPAEDIC SURGERY

## 2017-09-01 NOTE — TELEPHONE ENCOUNTER
----- Message from Carter Hidalgo sent at 9/1/2017  3:12 PM CDT -----  Contact: self  [phone]  Patient called stating the pharmacy - [facility] in [place] has not received rx norco.Thanks!

## 2017-09-01 NOTE — TELEPHONE ENCOUNTER
Returned pts call, no answer.Left voicemail informing pt the script request has been sent to , and the script will be sent later this evening.

## 2017-09-02 ENCOUNTER — NURSE TRIAGE (OUTPATIENT)
Dept: ADMINISTRATIVE | Facility: CLINIC | Age: 62
End: 2017-09-02

## 2017-09-02 NOTE — TELEPHONE ENCOUNTER
Reason for Disposition   Caller requesting a NON-URGENT new prescription or refill and triager unable to refill per unit policy    Protocols used: ST MEDICATION QUESTION CALL-A-    Patient called for refill on Norco. Triager unable to fill per policy. Education completed per [facility] On Call Care Advice including  Calling Dr. Montiel.  Patient/ Caregiver verbalize understanding.

## 2017-09-05 ENCOUNTER — TELEPHONE (OUTPATIENT)
Dept: ORTHOPEDICS | Facility: CLINIC | Age: 62
End: 2017-09-05

## 2017-09-05 RX ORDER — HYDROCODONE BITARTRATE AND ACETAMINOPHEN 10; 325 MG/1; MG/1
1 TABLET ORAL EVERY 4 HOURS PRN
Qty: 42 TABLET | Refills: 0 | Status: SHIPPED | OUTPATIENT
Start: 2017-09-05 | End: 2017-11-22

## 2017-09-05 NOTE — TELEPHONE ENCOUNTER
----- Message from Kelsie Webb sent at 9/1/2017  4:15 PM CDT -----  Contact: Vanna/ Natalia [phone]  Please call regarding this patient's prescription for Norco.  It was not sent to [facility].  Please call patient.

## 2017-09-06 NOTE — PROGRESS NOTES
Subjective:      Patient ID: Aye Montanez is a 62 y.o. female.    Chief Complaint: Post-op Evaluation of the Right Foot and Post-op Evaluation (s/p 2nd weil; 2nd hammertoe; ORIF 2nd toe; 2nd plantar plate repair; 1st bunionectomy 8/16/17)    Doing well today s/p right 2nd toe hammertoe correction with plantar plate repair and bunion correction. She rates her pain as 2/10 today.   Social History     Occupational History    Not on file.     Social History Main Topics    Smoking status: Never Smoker    Smokeless tobacco: Never Used    Alcohol use Yes      Comment: rarely    Drug use: No    Sexual activity: Yes     Partners: Male            Objective:    Ortho Exam     RLE: neurovascularly intact, incisions well healed. Good alignment. No signs of infection. Pin site Clean, dry, intact.  Assessment:       1. Injury of plantar plate of right foot, subsequent encounter    2. Hallux valgus of right foot          Plan:       Sutures were removed today and steri-strips were placed. Weight bearing as tolerated on heel only. Will keep pin 6 weeks total due to severity of plantar plate injury and poor quality tissue in that area. Toe spacer. F/u 2 weeks with xray of the right foot.

## 2017-09-14 DIAGNOSIS — S99.921D INJURY OF PLANTAR PLATE OF RIGHT FOOT, SUBSEQUENT ENCOUNTER: Primary | ICD-10-CM

## 2017-09-15 ENCOUNTER — OFFICE VISIT (OUTPATIENT)
Dept: ORTHOPEDICS | Facility: CLINIC | Age: 62
End: 2017-09-15
Payer: COMMERCIAL

## 2017-09-15 ENCOUNTER — HOSPITAL ENCOUNTER (OUTPATIENT)
Dept: RADIOLOGY | Facility: HOSPITAL | Age: 62
Discharge: HOME | End: 2017-09-15
Attending: ORTHOPAEDIC SURGERY
Payer: COMMERCIAL

## 2017-09-15 VITALS
HEIGHT: 67 IN | DIASTOLIC BLOOD PRESSURE: 83 MMHG | SYSTOLIC BLOOD PRESSURE: 138 MMHG | WEIGHT: 206 LBS | BODY MASS INDEX: 32.33 KG/M2 | HEART RATE: 76 BPM

## 2017-09-15 DIAGNOSIS — M20.11 HALLUX VALGUS OF RIGHT FOOT: ICD-10-CM

## 2017-09-15 DIAGNOSIS — S99.921D INJURY OF PLANTAR PLATE OF RIGHT FOOT, SUBSEQUENT ENCOUNTER: ICD-10-CM

## 2017-09-15 DIAGNOSIS — S99.921D INJURY OF PLANTAR PLATE OF RIGHT FOOT, SUBSEQUENT ENCOUNTER: Primary | ICD-10-CM

## 2017-09-15 PROCEDURE — 73630 X-RAY EXAM OF FOOT: CPT | Mod: TC,PO,RT

## 2017-09-15 PROCEDURE — 99999 PR PBB SHADOW E&M-EST. PATIENT-LVL III: CPT | Mod: PBBFAC,,, | Performed by: ORTHOPAEDIC SURGERY

## 2017-09-15 PROCEDURE — 99024 POSTOP FOLLOW-UP VISIT: CPT | Mod: S$GLB,,, | Performed by: ORTHOPAEDIC SURGERY

## 2017-09-15 PROCEDURE — 73630 X-RAY EXAM OF FOOT: CPT | Mod: 26,RT,, | Performed by: RADIOLOGY

## 2017-09-19 PROBLEM — M79.602 LEFT ARM PAIN: Status: RESOLVED | Noted: 2017-03-24 | Resolved: 2017-09-19

## 2017-09-19 PROBLEM — S99.929A PLANTAR PLATE INJURY: Status: RESOLVED | Noted: 2017-08-16 | Resolved: 2017-09-19

## 2017-09-19 NOTE — PROGRESS NOTES
Subjective:      Patient ID: Aye Montanez is a 62 y.o. female.    Chief Complaint: Post-op Evaluation of the Right Foot and Post-op Evaluation (s/p 2nd hammertoe; plantar plate repair with bunion correction 8/16/17)    Doing well today s/p right 2nd toe hammertoe correction with plantar plate repair and bunion correction. She rates her pain as 1/10 today.   Social History     Occupational History    Not on file.     Social History Main Topics    Smoking status: Never Smoker    Smokeless tobacco: Never Used    Alcohol use Yes      Comment: rarely    Drug use: No    Sexual activity: Yes     Partners: Male            Objective:    Ortho Exam     RLE: neurovascularly intact, incisions well healed. Good alignment. No signs of infection. Pin site Clean, dry, intact.  Assessment:         s/p right 2nd toe hammertoe correction with plantar plate repair and bunion correction  Plan:       Sutures were removed today and steri-strips were placed. Weight bearing as tolerated on heel only. Will keep pin 6 weeks total due to severity of plantar plate injury and poor quality tissue in that area. Toe spacer. F/u 2 weeks with xray of the right foot.

## 2017-09-25 DIAGNOSIS — S99.921D INJURY OF PLANTAR PLATE OF RIGHT FOOT, SUBSEQUENT ENCOUNTER: Primary | ICD-10-CM

## 2017-09-29 ENCOUNTER — HOSPITAL ENCOUNTER (OUTPATIENT)
Dept: RADIOLOGY | Facility: HOSPITAL | Age: 62
Discharge: HOME | End: 2017-09-29
Attending: ORTHOPAEDIC SURGERY
Payer: COMMERCIAL

## 2017-09-29 ENCOUNTER — OFFICE VISIT (OUTPATIENT)
Dept: ORTHOPEDICS | Facility: CLINIC | Age: 62
End: 2017-09-29
Payer: COMMERCIAL

## 2017-09-29 VITALS
HEART RATE: 60 BPM | BODY MASS INDEX: 32.33 KG/M2 | SYSTOLIC BLOOD PRESSURE: 173 MMHG | HEIGHT: 67 IN | DIASTOLIC BLOOD PRESSURE: 83 MMHG | WEIGHT: 206 LBS

## 2017-09-29 DIAGNOSIS — M20.11 HALLUX VALGUS OF RIGHT FOOT: Primary | ICD-10-CM

## 2017-09-29 DIAGNOSIS — S99.921D INJURY OF PLANTAR PLATE OF RIGHT FOOT, SUBSEQUENT ENCOUNTER: ICD-10-CM

## 2017-09-29 PROCEDURE — 99999 PR PBB SHADOW E&M-EST. PATIENT-LVL III: CPT | Mod: PBBFAC,,, | Performed by: ORTHOPAEDIC SURGERY

## 2017-09-29 PROCEDURE — 73630 X-RAY EXAM OF FOOT: CPT | Mod: TC,PO,RT

## 2017-09-29 PROCEDURE — 73630 X-RAY EXAM OF FOOT: CPT | Mod: 26,RT,, | Performed by: RADIOLOGY

## 2017-09-29 PROCEDURE — 99024 POSTOP FOLLOW-UP VISIT: CPT | Mod: S$GLB,,, | Performed by: ORTHOPAEDIC SURGERY

## 2017-10-04 NOTE — PROGRESS NOTES
Subjective:      Patient ID: Aye Montanez is a 62 y.o. female.    Chief Complaint: Post-op Evaluation of the Right Foot (DOS: 8-16-17/2nd hammertoe; plantar plate repair with bunion )    Doing well today s/p right 2nd toe hammertoe correction with plantar plate repair and bunion correction. She rates her pain as 0/10 today.   Social History     Occupational History    Not on file.     Social History Main Topics    Smoking status: Never Smoker    Smokeless tobacco: Never Used    Alcohol use Yes      Comment: rarely    Drug use: No    Sexual activity: Yes     Partners: Male            Objective:    Ortho Exam     RLE: neurovascularly intact, incisions well healed. Good alignment. No signs of infection. Pin site Clean, dry, intact.  Assessment:         s/p right 2nd toe hammertoe correction with plantar plate repair and bunion correction  Plan:       Pin removed today. Ok to transition to regular shoe. Taped 2nd toe. F/u 1 month with xray of the right foot.

## 2017-10-09 ENCOUNTER — TELEPHONE (OUTPATIENT)
Dept: ORTHOPEDICS | Facility: CLINIC | Age: 62
End: 2017-10-09

## 2017-10-09 NOTE — TELEPHONE ENCOUNTER
----- Message from Kiana Longo sent at 10/9/2017  3:45 PM CDT -----  Contact: self   Patient want to speak with a nurse regarding scheduling appointment for injection patient is having knee pain please call back at [phone]

## 2017-10-11 PROBLEM — I70.0 ATHEROSCLEROSIS OF AORTA: Status: ACTIVE | Noted: 2017-10-11

## 2017-10-12 PROBLEM — Z86.73 HISTORY OF CVA (CEREBROVASCULAR ACCIDENT): Status: ACTIVE | Noted: 2017-10-12

## 2017-10-24 PROBLEM — R07.9 CHEST PAIN: Status: ACTIVE | Noted: 2017-10-24

## 2017-11-02 DIAGNOSIS — M20.11 HALLUX VALGUS OF RIGHT FOOT: Primary | ICD-10-CM

## 2017-11-03 ENCOUNTER — HOSPITAL ENCOUNTER (OUTPATIENT)
Dept: RADIOLOGY | Facility: HOSPITAL | Age: 62
Discharge: HOME | End: 2017-11-03
Attending: ORTHOPAEDIC SURGERY
Payer: COMMERCIAL

## 2017-11-03 ENCOUNTER — OFFICE VISIT (OUTPATIENT)
Dept: ORTHOPEDICS | Facility: CLINIC | Age: 62
End: 2017-11-03
Payer: COMMERCIAL

## 2017-11-03 VITALS — HEIGHT: 67 IN | WEIGHT: 205 LBS | BODY MASS INDEX: 32.18 KG/M2

## 2017-11-03 DIAGNOSIS — M20.11 HALLUX VALGUS OF RIGHT FOOT: ICD-10-CM

## 2017-11-03 DIAGNOSIS — S99.921D INJURY OF PLANTAR PLATE OF RIGHT FOOT, SUBSEQUENT ENCOUNTER: Primary | ICD-10-CM

## 2017-11-03 PROBLEM — R07.9 CHEST PAIN: Status: RESOLVED | Noted: 2017-10-24 | Resolved: 2017-11-03

## 2017-11-03 PROCEDURE — 99024 POSTOP FOLLOW-UP VISIT: CPT | Mod: S$GLB,,, | Performed by: ORTHOPAEDIC SURGERY

## 2017-11-03 PROCEDURE — 73630 X-RAY EXAM OF FOOT: CPT | Mod: 26,RT,, | Performed by: RADIOLOGY

## 2017-11-03 PROCEDURE — 99999 PR PBB SHADOW E&M-EST. PATIENT-LVL II: CPT | Mod: PBBFAC,,, | Performed by: ORTHOPAEDIC SURGERY

## 2017-11-03 PROCEDURE — 73630 X-RAY EXAM OF FOOT: CPT | Mod: TC,PO,RT

## 2017-11-03 NOTE — PROGRESS NOTES
Subjective:      Patient ID: Aye Montanez is a 62 y.o. female.    Chief Complaint: Post-op Evaluation of the Right Foot and Post-op Evaluation (s/p 2nd hammertoe; plantar plate repair with bunion 8/16/17)    Doing well today s/p right 2nd toe hammertoe correction with plantar plate repair and bunion correction. She rates her pain as 0/10 today. She is walking in a regular shoe.     Social History     Occupational History    Not on file.     Social History Main Topics    Smoking status: Never Smoker    Smokeless tobacco: Never Used    Alcohol use Yes      Comment: rarely    Drug use: No    Sexual activity: Yes     Partners: Male        Objective:    Ortho Exam     RLE: neurovascularly intact, incisions well healed. Good alignment. Mild elevation of the 2nd toe.     Assessment:         s/p right 2nd toe hammertoe correction with plantar plate repair and bunion correction  Plan:     Gradual return to exercise.  scar tissue massage. Continue taping and spacer 2-3 months. F/u prn.

## 2017-11-22 PROBLEM — I25.84 CORONARY ARTERY DISEASE DUE TO CALCIFIED CORONARY LESION: Status: ACTIVE | Noted: 2017-11-22

## 2017-11-22 PROBLEM — I25.10 CORONARY ARTERY DISEASE DUE TO CALCIFIED CORONARY LESION: Status: ACTIVE | Noted: 2017-11-22

## 2017-11-27 PROBLEM — R50.9 FEELS FEVERISH: Status: ACTIVE | Noted: 2017-11-27

## 2017-11-27 PROBLEM — J02.9 PHARYNGITIS, ACUTE: Status: ACTIVE | Noted: 2017-11-27

## 2017-12-15 PROBLEM — R50.9 FEELS FEVERISH: Status: RESOLVED | Noted: 2017-11-27 | Resolved: 2017-12-15

## 2017-12-15 PROBLEM — J02.9 PHARYNGITIS, ACUTE: Status: RESOLVED | Noted: 2017-11-27 | Resolved: 2017-12-15

## 2017-12-23 PROBLEM — J20.9 BRONCHITIS, ACUTE, WITH BRONCHOSPASM: Status: ACTIVE | Noted: 2017-12-23

## 2017-12-23 PROBLEM — J40 BRONCHITIS: Status: ACTIVE | Noted: 2017-12-23

## 2017-12-29 PROBLEM — J20.9 BRONCHITIS, ACUTE, WITH BRONCHOSPASM: Status: RESOLVED | Noted: 2017-12-23 | Resolved: 2017-12-29

## 2018-02-06 DIAGNOSIS — M25.562 LEFT KNEE PAIN, UNSPECIFIED CHRONICITY: Primary | ICD-10-CM

## 2018-02-08 ENCOUNTER — HOSPITAL ENCOUNTER (OUTPATIENT)
Dept: RADIOLOGY | Facility: HOSPITAL | Age: 63
Discharge: HOME | End: 2018-02-08
Attending: ORTHOPAEDIC SURGERY
Payer: MEDICARE

## 2018-02-08 ENCOUNTER — OFFICE VISIT (OUTPATIENT)
Dept: ORTHOPEDICS | Facility: CLINIC | Age: 63
End: 2018-02-08
Payer: MEDICARE

## 2018-02-08 VITALS — BODY MASS INDEX: 33.12 KG/M2 | HEIGHT: 67 IN | WEIGHT: 211 LBS

## 2018-02-08 DIAGNOSIS — M25.562 LEFT KNEE PAIN, UNSPECIFIED CHRONICITY: ICD-10-CM

## 2018-02-08 DIAGNOSIS — M17.12 OSTEOARTHROSIS, LOCALIZED, PRIMARY, KNEE, LEFT: Primary | ICD-10-CM

## 2018-02-08 DIAGNOSIS — M25.561 BILATERAL KNEE PAIN: ICD-10-CM

## 2018-02-08 DIAGNOSIS — M25.562 BILATERAL KNEE PAIN: ICD-10-CM

## 2018-02-08 PROCEDURE — 73564 X-RAY EXAM KNEE 4 OR MORE: CPT | Mod: TC,50,PO

## 2018-02-08 PROCEDURE — 3008F BODY MASS INDEX DOCD: CPT | Mod: S$GLB,,, | Performed by: ORTHOPAEDIC SURGERY

## 2018-02-08 PROCEDURE — 99999 PR PBB SHADOW E&M-EST. PATIENT-LVL II: CPT | Mod: PBBFAC,,, | Performed by: ORTHOPAEDIC SURGERY

## 2018-02-08 PROCEDURE — 99203 OFFICE O/P NEW LOW 30 MIN: CPT | Mod: S$GLB,,, | Performed by: ORTHOPAEDIC SURGERY

## 2018-02-08 PROCEDURE — 73564 X-RAY EXAM KNEE 4 OR MORE: CPT | Mod: 26,50,, | Performed by: RADIOLOGY

## 2018-02-08 NOTE — PROGRESS NOTES
DATE: 2/8/2018  PATIENT: Aye Montanez  REFERRING MD:  CHIEF COMPLAINT:   Chief Complaint   Patient presents with    Left Knee - Pain    Right Knee - Pain       HISTORY:  Aye Montanez is a 62 y.o. female  who presents for initial evaluation of her left knee.  She notes significant discomfort after an injury to December where she fell off a ladder [place].  Since that time she's had swelling and discomfort.  She does have a past history remarkable as and underwent arthroscopy of her left knee 30 years ago.  She also had right knee arthroscopic surgery 2016.  She is taking Celebrex with only mild improvement.  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      PAST MEDICAL/SURGICAL HISTORY:  Past Medical History:   Diagnosis Date    Bilateral dry eyes      Past Surgical History:   Procedure Laterality Date    CAROTID STENT  10/2017    CHOLECYSTECTOMY      FRACTURE SURGERY Right     foot    HYSTERECTOMY      1984    KNEE ARTHROSCOPY Right 2/2016    OOPHORECTOMY Bilateral     hyst. 1984    TOTAL REDUCTION MAMMOPLASTY Bilateral     1999       Current Medications:   Current Outpatient Prescriptions:     acetaminophen (TYLENOL) 500 MG tablet, Take 1,000 mg by mouth every 6 (six) hours as needed for Pain., Disp: , Rfl:     albuterol sulfate (PROAIR RESPICLICK) 90 mcg/actuation AePB, Inhale 180 mcg into the lungs every 4 (four) hours. Rescue, Disp: , Rfl:     aspirin (ECOTRIN) 81 MG EC tablet, Take 1 tablet (81 mg total) by mouth once daily., Disp: , Rfl: 0    budesonide-formoterol 160-4.5 mcg (SYMBICORT) 160-4.5 mcg/actuation HFAA, Inhale 2 puffs into the lungs every 12 (twelve) hours. Controller, Disp: , Rfl:     calcium-vitamin D3 (CALCIUM 500 + D) 500 mg(1,250mg) -200 unit per tablet, Take 1 tablet by mouth 2 (two) times daily with meals., Disp: , Rfl:     celecoxib (CELEBREX) 100 MG capsule, Take 100 mg by mouth every other day. , Disp: , Rfl:      clopidogrel (PLAVIX) 75 mg tablet, Take 1 tablet (75 mg total) by mouth once daily., Disp: 90 tablet, Rfl: 3    fluticasone (FLONASE) 50 mcg/actuation nasal spray, 1 spray by Each Nare route 2 (two) times daily., Disp: 1 Bottle, Rfl: 2    furosemide (LASIX) 20 MG tablet, Take 1 tablet (20 mg total) by mouth once daily., Disp: 90 tablet, Rfl: 3    KRILL OIL ORAL, Take 2,000 mg by mouth 2 (two) times daily. , Disp: , Rfl:     lactobacillus combo no.6 4 billion cell Tab, Take 1 capsule by mouth once daily at 6am., Disp: , Rfl:     nitroGLYCERIN (NITROSTAT) 0.3 MG SL tablet, Take one tablet under tongue as needed for severe esophageal spasm---may repeat in 5 min if needed, Disp: 25 tablet, Rfl: 2    potassium chloride SA (K-DUR,KLOR-CON) 20 MEQ tablet, Take 1 tablet (20 mEq total) by mouth once daily., Disp: 90 tablet, Rfl: 3    RESTASIS 0.05 % ophthalmic emulsion, Place 1 drop into both eyes 2 (two) times daily., Disp: , Rfl:     rosuvastatin (CRESTOR) 10 MG tablet, Take 1 tablet (10 mg total) by mouth once daily., Disp: 90 tablet, Rfl: 3    Family History: family history was reviewed and is noncontributory  Social History:   Social History     Social History    Marital status:      Spouse name: N/A    Number of children: N/A    Years of education: N/A     Occupational History    Not on file.     Social History Main Topics    Smoking status: Never Smoker    Smokeless tobacco: Never Used    Alcohol use Yes      Comment: rarely    Drug use: No    Sexual activity: Yes     Partners: Male     Other Topics Concern    Not on file     Social History Narrative    No narrative on file       ROS:  Constitution: Negative for chills, fever, and sweats. Negative for unexplained weight loss.  HENT: Negative for headaches and blurry vision.   Cardiovascular: Negative for chest pain, irregular heartbeat, leg swelling and palpitations.   Respiratory: Negative for cough and shortness of breath.  "  Gastrointestinal: Negative for abdominal pain, heartburn, nausea and vomiting.   Genitourinary: Negative for bladder incontinence and dysuria.   Musculoskeletal: Negative for systemic arthritis, muscle weakness and myalgias.   Neurological: Negative for numbness.   Psychiatric/Behavioral: Negative for depression.  Endocrine: Negative for polyuria.   Hematologic/Lymphatic: Negative for bleeding disorders.   Skin: Negative for poor wound healing.        PHYSICAL EXAM:  Ht 5' 7" (1.702 m)   Wt 95.7 kg (210 lb 15.7 oz)   BMI 33.04 kg/m²   Aye Montanez is a well developed, well nourished female in no acute distress. Physical examination of the left knee evaluated the following:    Gait and Alignment  Inspection for ecchymosis, swelling, atrophy, or deformity  Inspection for intra-articular and/or bursal effusions  Tenderness to palpation over the bony and soft tissue structures around the knee  Range of Motion and presence of extensor lag/contractures  Sensation and motor strength  Varus/valgus or anterior/posterior/rotatory instability  Flexion pinch and Jennifer's Tests  Patellar alignment/tracking/pain to palpation  Vascular exam to include skin temperature/color/capillary refill    Remarkable findings included:  Normal alignment.  Mild swelling.  No effusion.  There is medial and lateral joint line tenderness.  Mild crepitus with range of motion.  Sensation intact to lower positive flexion pinch        IMAGING:   X-rays of the left knee are personally reviewed.  No acute fractures or dislocations are seen.  Significant degenerative changes with near bone-on-bone medial compartment narrowing noted.  Degenerative changes in the lateral compartment and patellofemoral joint also identified.  Incidentally noted is moderate arthrosis of the right knee with lateral compartment narrowing.     ASSESSMENT:   Osteoarthrosis left knee    PLAN:  The nature of the diagnosis, using models and diagrams when appropriate, was " explained to the patient in detail.Treatment option discussed included observation, physical therapy, cortisone injection, viscous supplementation, and referral for arthroplasty.  Aye reports that she cannot have cortisone injections as she's had a reaction in the past.  She did have Visco supplementation to the right knee and responded nicely.  She is not ready to proceed with arthroplasty.  She wishes to proceed with Visco supplementation.  Follow-up once insurance authorization has been obtained for Euflexxa injections to the left knee..      This note was dictated using voice recognition software. Please excuse any grammatical or typographical errors.  Answers for HPI/ROS submitted by the patient on 2/6/2018   Leg pain  unexpected weight change: No  appetite change : No  sleep disturbance: Yes  IMMUNOCOMPROMISED: Yes  nervous/ anxious: Yes  dysphoric mood: No  rash: No  visual disturbance: Yes  eye redness: Yes  eye pain: Yes  tinnitus: No  hearing loss: No  sinus pressure : No  nosebleeds: Yes  enviro allergies: Yes  food allergies: No  cough: No  shortness of breath: Yes  dysuria: No  frequency: Yes  difficulty urinating: No  hematuria: No  painful intercourse: No  chest pain: No  palpitations: Yes  nausea: No  vomiting: No  diarrhea: No  blood in stool: No  constipation: Yes  headaches: Yes  numbness: Yes  seizures: No  joint swelling: Yes  myalgia: Yes  weakness: Yes  back pain: Yes  Pain Chronicity: recurrent  History of trauma: No  Onset: more than 1 month ago  Frequency: constantly  Progression since onset: gradually worsening  Injury mechanism: falling  injury location: at home  pain- numeric: 10/10  pain location: left hip, left knee, right knee, left ankle  pain quality: aching, burning, shooting, throbbing  Radiating Pain: No  Aggravating factors: bending, bearing weight, exercise, walking, lifting, lying down, rotation, sitting  fever: No  inability to bear weight: Yes  itching: No  joint  locking: Yes  limited range of motion: Yes  stiffness: Yes  tingling: No  Treatments tried: cold, injection treatment, OTC ointments, OTC pain meds, rest  physical therapy: ineffective  Improvement on treatment: mild

## 2018-02-22 ENCOUNTER — OFFICE VISIT (OUTPATIENT)
Dept: ORTHOPEDICS | Facility: CLINIC | Age: 63
End: 2018-02-22
Payer: MEDICARE

## 2018-02-22 VITALS — WEIGHT: 210 LBS | HEIGHT: 67 IN | BODY MASS INDEX: 32.96 KG/M2

## 2018-02-22 DIAGNOSIS — M17.12 PRIMARY OSTEOARTHRITIS OF LEFT KNEE: Primary | ICD-10-CM

## 2018-02-22 PROCEDURE — 99999 PR PBB SHADOW E&M-EST. PATIENT-LVL II: CPT | Mod: PBBFAC,,, | Performed by: ORTHOPAEDIC SURGERY

## 2018-02-22 PROCEDURE — 99499 UNLISTED E&M SERVICE: CPT | Mod: S$GLB,,, | Performed by: ORTHOPAEDIC SURGERY

## 2018-02-22 PROCEDURE — 20611 DRAIN/INJ JOINT/BURSA W/US: CPT | Mod: LT,S$GLB,, | Performed by: ORTHOPAEDIC SURGERY

## 2018-02-22 RX ORDER — HYALURONATE SODIUM 20 MG/2 ML
20 SYRINGE (ML) INTRAARTICULAR
Status: DISCONTINUED | OUTPATIENT
Start: 2018-02-22 | End: 2018-02-22 | Stop reason: HOSPADM

## 2018-02-22 RX ADMIN — Medication 20 MG: at 08:02

## 2018-02-22 NOTE — PROGRESS NOTES
"DATE: 2/22/2018  PATIENT: Aye Montanez    Attending Physician: Tex Ramos M.D.    HISTORY:  Aye Montanez is a 63 y.o. female who returns for follow up evaluation of  her left knee.  She's been diagnosed with osteoarthrosis.  She presents today for Euflexxa injection #1.    PMH/PSH/FamHx/SocHx:  Reviewed and unchanged from prior visit    ROS:  Constitution: Negative for chills, fever, and sweats. Negative for unexplained weight loss.  HENT: Negative for headaches and blurry vision.   Cardiovascular: Negative for chest pain, irregular heartbeat, leg swelling and palpitations.   Respiratory: Negative for cough and shortness of breath.   Gastrointestinal: Negative for abdominal pain, heartburn, nausea and vomiting.   Genitourinary: Negative for bladder incontinence and dysuria.   Musculoskeletal: Negative for systemic arthritis, joint swelling, muscle weakness and myalgias.   Neurological: Negative for numbness.   Psychiatric/Behavioral: Negative for depression.   Endocrine: Negative for polyuria.   Hematologic/Lymphatic: Negative for bleeding disorders.  Skin: Negative for poor wound healing.       EXAM:  Ht 5' 7" (1.702 m)   Wt 95.3 kg (210 lb)   BMI 32.89 kg/m²   Aye Montanez is a well developed, well nourished female in no acute distress. Physical examination of the left knee evaluated the following:     Gait and Alignment  Inspection for ecchymosis, swelling, atrophy, or deformity  Inspection for intra-articular and/or bursal effusions  Tenderness to palpation over the bony and soft tissue structures around the knee  Range of Motion and presence of extensor lag/contractures  Sensation and motor strength  Varus/valgus or anterior/posterior/rotatory instability  Flexion pinch and Jennifer's Tests  Patellar alignment/tracking/pain to palpation  Vascular exam to include skin temperature/color/capillary refill     Remarkable findings included:  Normal alignment.  Mild swelling.  No effusion.  There " is medial and lateral joint line tenderness.  Mild crepitus with range of motion.  Sensation intact to lower positive flexion pinch       IMAGING:   No new x-rays are performed today.    ASSESSMENT:  Osteoarthrosis left knee    PLAN:  The implications of the patient's evolution of symptoms and findings were explained to the patient in detail.Euflexxa injection #1 performed today to the left knee (see procedure note).  She'll monitor for swelling and inflammatory symptoms.  Follow-up next week for Euflexxa injection #2.      This note was dictated using voice recognition software. Please excuse any grammatical or typographical errors.

## 2018-02-22 NOTE — PROCEDURES
Large Joint Aspiration/Injection  Date/Time: 2/22/2018 8:21 AM  Performed by: DEMETRIS FOSTER  Authorized by: DEMETRIS FOSTER     Consent Done?:  Yes (Verbal)  Indications:  Pain  Timeout: Prior to procedure the correct patient, procedure, and site was verified      Location:  Knee  Site:  L knee  Prep: Patient was prepped and draped in usual sterile fashion    Ultrasonic Guidance for needle placement: Yes  Images are saved and documented.  Needle size:  22 G  Approach:  Anterolateral  Medications:  20 mg EUFLEXXA 10 mg/mL(mw 2.4 -3.6 million)  Patient tolerance:  Patient tolerated the procedure well with no immediate complications

## 2018-03-01 ENCOUNTER — OFFICE VISIT (OUTPATIENT)
Dept: ORTHOPEDICS | Facility: CLINIC | Age: 63
End: 2018-03-01
Payer: MEDICARE

## 2018-03-01 VITALS — WEIGHT: 210 LBS | BODY MASS INDEX: 32.96 KG/M2 | HEIGHT: 67 IN

## 2018-03-01 DIAGNOSIS — M17.12 OSTEOARTHROSIS, LOCALIZED, PRIMARY, KNEE, LEFT: Primary | ICD-10-CM

## 2018-03-01 PROCEDURE — 99499 UNLISTED E&M SERVICE: CPT | Mod: S$GLB,,, | Performed by: ORTHOPAEDIC SURGERY

## 2018-03-01 PROCEDURE — 20611 DRAIN/INJ JOINT/BURSA W/US: CPT | Mod: LT,S$GLB,, | Performed by: ORTHOPAEDIC SURGERY

## 2018-03-01 PROCEDURE — 99999 PR PBB SHADOW E&M-EST. PATIENT-LVL II: CPT | Mod: PBBFAC,,, | Performed by: ORTHOPAEDIC SURGERY

## 2018-03-01 RX ORDER — HYALURONATE SODIUM 20 MG/2 ML
20 SYRINGE (ML) INTRAARTICULAR
Status: DISCONTINUED | OUTPATIENT
Start: 2018-03-01 | End: 2018-03-01 | Stop reason: HOSPADM

## 2018-03-01 RX ADMIN — Medication 20 MG: at 10:03

## 2018-03-01 NOTE — PROCEDURES
Large Joint Aspiration/Injection  Date/Time: 3/1/2018 10:12 AM  Performed by: DEMETRIS FOSTER  Authorized by: DEMETRIS FOSTER     Consent Done?:  Yes (Verbal)  Indications:  Pain  Timeout: Prior to procedure the correct patient, procedure, and site was verified      Location:  Knee  Site:  L knee  Ultrasonic Guidance for needle placement: Yes  Images are saved and documented.  Needle size:  22 G  Approach:  Anterolateral  Medications:  20 mg EUFLEXXA 10 mg/mL(mw 2.4 -3.6 million)  Patient tolerance:  Patient tolerated the procedure well with no immediate complications

## 2018-03-01 NOTE — PROGRESS NOTES
"DATE: 3/1/2018  PATIENT: Aye Montanez    Attending Physician: Tex Ramos M.D.    HISTORY:  Aye Montanez is a 63 y.o. female who returns for follow up evaluation of  her left knee.  She's been diagnosed with osteoarthrosis.  She presents today for Euflexxa injection #2.  She denied any reactions last week with the injection.  Pain is reported at 5/10 today.    PMH/PSH/FamHx/SocHx:  Reviewed and unchanged from prior visit    ROS:  Constitution: Negative for chills, fever, and sweats. Negative for unexplained weight loss.  HENT: Negative for headaches and blurry vision.   Cardiovascular: Negative for chest pain, irregular heartbeat, leg swelling and palpitations.   Respiratory: Negative for cough and shortness of breath.   Gastrointestinal: Negative for abdominal pain, heartburn, nausea and vomiting.   Genitourinary: Negative for bladder incontinence and dysuria.   Musculoskeletal: Negative for systemic arthritis, joint swelling, muscle weakness and myalgias.   Neurological: Negative for numbness.   Psychiatric/Behavioral: Negative for depression.   Endocrine: Negative for polyuria.   Hematologic/Lymphatic: Negative for bleeding disorders.  Skin: Negative for poor wound healing.       EXAM:  Ht 5' 7" (1.702 m)   Wt 95.3 kg (210 lb)   BMI 32.89 kg/m²   Aye Montanez is a well developed, well nourished female in no acute distress. Physical examination of the left knee evaluated the following:     Gait and Alignment  Inspection for ecchymosis, swelling, atrophy, or deformity  Inspection for intra-articular and/or bursal effusions  Tenderness to palpation over the bony and soft tissue structures around the knee  Range of Motion and presence of extensor lag/contractures  Sensation and motor strength  Varus/valgus or anterior/posterior/rotatory instability  Flexion pinch and Jennifer's Tests  Patellar alignment/tracking/pain to palpation  Vascular exam to include skin temperature/color/capillary " refill     Remarkable findings included:  Normal alignment.  Mild swelling.  No effusion.  There is medial and lateral joint line tenderness.  Mild crepitus with range of motion.  Sensation intact to lower positive flexion pinch       IMAGING:   No new x-rays are performed today.    ASSESSMENT:  Osteoarthrosis left knee    PLAN:  The implications of the patient's evolution of symptoms and findings were explained to the patient in detail.Euflexxa injection #2 performed today to the left knee (see procedure note).  She'll monitor for swelling and inflammatory symptoms.  Follow-up next week for Euflexxa injection #3.       This note was dictated using voice recognition software. Please excuse any grammatical or typographical errors.

## 2018-03-08 ENCOUNTER — OFFICE VISIT (OUTPATIENT)
Dept: ORTHOPEDICS | Facility: CLINIC | Age: 63
End: 2018-03-08
Payer: MEDICARE

## 2018-03-08 ENCOUNTER — TELEPHONE (OUTPATIENT)
Dept: ORTHOPEDICS | Facility: CLINIC | Age: 63
End: 2018-03-08

## 2018-03-08 VITALS — BODY MASS INDEX: 32.96 KG/M2 | WEIGHT: 210 LBS | HEIGHT: 67 IN

## 2018-03-08 DIAGNOSIS — M17.12 OSTEOARTHROSIS, LOCALIZED, PRIMARY, KNEE, LEFT: Primary | ICD-10-CM

## 2018-03-08 PROCEDURE — 99499 UNLISTED E&M SERVICE: CPT | Mod: S$GLB,,, | Performed by: ORTHOPAEDIC SURGERY

## 2018-03-08 PROCEDURE — 20611 DRAIN/INJ JOINT/BURSA W/US: CPT | Mod: LT,S$GLB,, | Performed by: ORTHOPAEDIC SURGERY

## 2018-03-08 PROCEDURE — 99999 PR PBB SHADOW E&M-EST. PATIENT-LVL II: CPT | Mod: PBBFAC,,, | Performed by: ORTHOPAEDIC SURGERY

## 2018-03-08 RX ORDER — HYALURONATE SODIUM 20 MG/2 ML
20 SYRINGE (ML) INTRAARTICULAR
Status: DISCONTINUED | OUTPATIENT
Start: 2018-03-08 | End: 2018-03-08 | Stop reason: HOSPADM

## 2018-03-08 RX ADMIN — Medication 20 MG: at 08:03

## 2018-03-08 NOTE — TELEPHONE ENCOUNTER
Pt would like to transfer her care to Dr. Bee. Dr. Ramos recommended Dr. Bee but I am unable to find an available appt. Please contact the pt to schedule. Thank you.

## 2018-03-08 NOTE — PROGRESS NOTES
"DATE: 3/8/2018  PATIENT: Aye Montanez    Attending Physician: Tex Ramos M.D.    HISTORY:  Aye Montanez is a 63 y.o. female who returns for follow up evaluation of  her left knee.  She's been diagnosed with osteoarthrosis.  She presents today for Euflexxa injection #3.  She denied any reactions last week with the injection.  Pain is reported at 3/10 today.    PMH/PSH/FamHx/SocHx:  Reviewed and unchanged from prior visit    ROS:  Constitution: Negative for chills, fever, and sweats. Negative for unexplained weight loss.  HENT: Negative for headaches and blurry vision.   Cardiovascular: Negative for chest pain, irregular heartbeat, leg swelling and palpitations.   Respiratory: Negative for cough and shortness of breath.   Gastrointestinal: Negative for abdominal pain, heartburn, nausea and vomiting.   Genitourinary: Negative for bladder incontinence and dysuria.   Musculoskeletal: Negative for systemic arthritis, joint swelling, muscle weakness and myalgias.   Neurological: Negative for numbness.   Psychiatric/Behavioral: Negative for depression.   Endocrine: Negative for polyuria.   Hematologic/Lymphatic: Negative for bleeding disorders.  Skin: Negative for poor wound healing.       EXAM:  Ht 5' 7" (1.702 m)   Wt 95.3 kg (210 lb)   BMI 32.89 kg/m²   Aye Montanez is a well developed, well nourished female in no acute distress. Physical examination of the left knee evaluated the following:     Gait and Alignment  Inspection for ecchymosis, swelling, atrophy, or deformity  Inspection for intra-articular and/or bursal effusions  Tenderness to palpation over the bony and soft tissue structures around the knee  Range of Motion and presence of extensor lag/contractures  Sensation and motor strength  Varus/valgus or anterior/posterior/rotatory instability  Flexion pinch and Jennifer's Tests  Patellar alignment/tracking/pain to palpation  Vascular exam to include skin temperature/color/capillary " refill     Remarkable findings included:  Normal alignment.  Mild swelling.  No effusion.  There is medial and lateral joint line tenderness.  Mild crepitus with range of motion.  Sensation intact to lower positive flexion pinch       IMAGING:   No new x-rays are performed today.    ASSESSMENT:  Osteoarthrosis left knee    PLAN:  The implications of the patient's evolution of symptoms and findings were explained to the patient in detail.Euflexxa injection #3 performed today to the left knee (see procedure note).  She'll monitor for swelling and inflammatory symptoms.  Activities may be performed as tolerated.  Follow-up if not improving or worse.    This note was dictated using voice recognition software. Please excuse any grammatical or typographical errors.

## 2018-03-08 NOTE — PROCEDURES
Large Joint Aspiration/Injection  Date/Time: 3/8/2018 8:47 AM  Performed by: DEMETRIS FOSTER  Authorized by: DEMETRIS FOSTER     Consent Done?:  Yes (Verbal)  Indications:  Pain  Timeout: Prior to procedure the correct patient, procedure, and site was verified      Location:  Knee  Site:  L knee  Ultrasonic Guidance for needle placement: Yes  Images are saved and documented.  Needle size:  22 G  Approach:  Anterolateral  Medications:  20 mg EUFLEXXA 10 mg/mL(mw 2.4 -3.6 million)  Patient tolerance:  Patient tolerated the procedure well with no immediate complications

## 2018-04-11 PROBLEM — M85.851 OSTEOPENIA OF RIGHT THIGH: Status: ACTIVE | Noted: 2018-04-11

## 2018-04-11 PROBLEM — R04.0 EPISTAXIS: Status: ACTIVE | Noted: 2018-04-11

## 2018-04-11 PROBLEM — M20.11 HALLUX VALGUS OF RIGHT FOOT: Status: RESOLVED | Noted: 2017-08-15 | Resolved: 2018-04-11

## 2018-04-11 PROBLEM — S99.921A INJURY OF PLANTAR PLATE OF RIGHT FOOT: Status: RESOLVED | Noted: 2017-05-01 | Resolved: 2018-04-11

## 2018-06-29 ENCOUNTER — TELEPHONE (OUTPATIENT)
Dept: RHEUMATOLOGY | Facility: CLINIC | Age: 63
End: 2018-06-29

## 2018-06-29 NOTE — TELEPHONE ENCOUNTER
----- Message from Yesica Moralez sent at 6/29/2018 12:42 PM CDT -----  Contact: Patient  Type: Needs Medical Advice    Who Called:  Patient   Symptoms (please be specific):  Sjogren's Syndrome  How long has patient had these symptoms:  BH[facility]IN  Pharmacy name and phone #:  [facility]  Best Call Back Number:   Additional Information: Calling to reschedule her consult appt on 7/12/18. Please advise.

## 2018-06-29 NOTE — TELEPHONE ENCOUNTER
Patient called to reschedule new patient appt with Dr. Bee. Informed if she cancelled then the next available will be December. Patient did not want to reschedule.

## 2018-07-11 ENCOUNTER — TELEPHONE (OUTPATIENT)
Dept: RHEUMATOLOGY | Facility: CLINIC | Age: 63
End: 2018-07-11

## 2018-07-11 NOTE — TELEPHONE ENCOUNTER
----- Message from Aye Estes sent at 7/11/2018  9:54 AM CDT -----  Contact: self  Patient [phone] is calling to say that her appt that was scheduled for tomorrow 07 11 18 was cancelled but then it was suppose to be added back on for tomorrow/please call patient to discuss as she is upset that her appt was not rebooked/

## 2018-08-16 ENCOUNTER — INITIAL CONSULT (OUTPATIENT)
Dept: RHEUMATOLOGY | Facility: CLINIC | Age: 63
End: 2018-08-16
Payer: MEDICARE

## 2018-08-16 ENCOUNTER — HOSPITAL ENCOUNTER (OUTPATIENT)
Dept: RADIOLOGY | Facility: HOSPITAL | Age: 63
Discharge: HOME | End: 2018-08-16
Attending: INTERNAL MEDICINE
Payer: MEDICARE

## 2018-08-16 VITALS
WEIGHT: 222.13 LBS | BODY MASS INDEX: 34.87 KG/M2 | DIASTOLIC BLOOD PRESSURE: 76 MMHG | HEIGHT: 67 IN | HEART RATE: 60 BPM | SYSTOLIC BLOOD PRESSURE: 134 MMHG

## 2018-08-16 DIAGNOSIS — R53.83 MALAISE AND FATIGUE: ICD-10-CM

## 2018-08-16 DIAGNOSIS — M35.01 SJOGREN'S SYNDROME WITH KERATOCONJUNCTIVITIS SICCA: Primary | ICD-10-CM

## 2018-08-16 DIAGNOSIS — I25.84 CORONARY ARTERY DISEASE DUE TO CALCIFIED CORONARY LESION: ICD-10-CM

## 2018-08-16 DIAGNOSIS — Z86.73 HISTORY OF CVA (CEREBROVASCULAR ACCIDENT): ICD-10-CM

## 2018-08-16 DIAGNOSIS — M19.90 OSTEOARTHRITIS, UNSPECIFIED OSTEOARTHRITIS TYPE, UNSPECIFIED SITE: ICD-10-CM

## 2018-08-16 DIAGNOSIS — R53.81 MALAISE AND FATIGUE: ICD-10-CM

## 2018-08-16 DIAGNOSIS — I25.10 CORONARY ARTERY DISEASE DUE TO CALCIFIED CORONARY LESION: ICD-10-CM

## 2018-08-16 DIAGNOSIS — I73.00 RAYNAUD'S DISEASE WITHOUT GANGRENE: ICD-10-CM

## 2018-08-16 PROCEDURE — 3008F BODY MASS INDEX DOCD: CPT | Mod: CPTII,S$GLB,, | Performed by: INTERNAL MEDICINE

## 2018-08-16 PROCEDURE — 73130 X-RAY EXAM OF HAND: CPT | Mod: 50,TC,FY,PO

## 2018-08-16 PROCEDURE — 99205 OFFICE O/P NEW HI 60 MIN: CPT | Mod: S$GLB,,, | Performed by: INTERNAL MEDICINE

## 2018-08-16 PROCEDURE — 99999 PR PBB SHADOW E&M-EST. PATIENT-LVL III: CPT | Mod: PBBFAC,,, | Performed by: INTERNAL MEDICINE

## 2018-08-16 PROCEDURE — 73130 X-RAY EXAM OF HAND: CPT | Mod: 26,50,, | Performed by: RADIOLOGY

## 2018-08-16 RX ORDER — MELOXICAM 7.5 MG/1
7.5 TABLET ORAL 2 TIMES DAILY
Qty: 60 TABLET | Refills: 4 | Status: SHIPPED | OUTPATIENT
Start: 2018-08-16 | End: 2018-09-15

## 2018-08-16 ASSESSMENT — ROUTINE ASSESSMENT OF PATIENT INDEX DATA (RAPID3)
PSYCHOLOGICAL DISTRESS SCORE: 5.5
TOTAL RAPID3 SCORE: 5.17
MDHAQ FUNCTION SCORE: 1.2
PAIN SCORE: 6.5
PATIENT GLOBAL ASSESSMENT SCORE: 5

## 2018-08-16 NOTE — PROGRESS NOTES
Subjective:          Chief Complaint: Aye Montanez is a 63 y.o. female who had concerns including Disease Management.    HPI:    Patient is a 63-year-old female she has a history of Sjogren syndrome the previously seen by Dr. Ochoa  She is followed by Dr. bere ferro the [facility] eye clinic.  She was taken off HCQ no active maculopathy but she was concerned about ASE.   She was on Prednisone. She has 80% improvement in joints with higher steroids.   On Restasis. Never salagen/Evozac. +punctal plugs. Did try Xiidra did not help.       She notes burning in her eyes.  Photophobia occasional redness to her eyes.  She does have some throat spasms and a few episodes of midepigastric pain.  And she was on  hydroxychloroquine.  Patient does note a history of Raynaud's she does have some coronary artery disease with stenting of the LAD in 2017.  He has a history of leukopenia and other pancytopenia is and vitamin-D deficiency.  She also has a history of osteoarthritis right hand is swollen and painful and she does use Aleve for her joints rates the pain as 6-7 4/10 with a dull ache. She is on Celebrex 100mg BID.   She has no strength in her hands, knees are painful. Feet burn with standing. No overt swelling of joints with the exception of left voler wrist synovial cyst.  No known ILD does have some breathing difficulty following any anesthesia. CT chest with atelectasis, subcentimeter sacttered nodules. Never seen with Pulm.   Did have proximal LAD stenting, no MI      Inense pruritis at night, not during day. Truncal and arms w/o rash.     Quantitative immunoglobulins did did show an elevation in her IgG levels with primary.    REVIEW OF SYSTEMS:    ROS            Objective:            Past Medical History:   Diagnosis Date    Bilateral dry eyes      Family History   Problem Relation Age of Onset    Cancer Mother     Ovarian cancer Mother 40    Cancer Father         esophageal    Stroke Maternal Aunt     Rheum  arthritis Maternal Aunt     Rheum arthritis Paternal Uncle      Social History     Tobacco Use    Smoking status: Never Smoker    Smokeless tobacco: Never Used   Substance Use Topics    Alcohol use: Yes     Comment: rarely    Drug use: No         Current Outpatient Medications on File Prior to Visit   Medication Sig Dispense Refill    acetaminophen (TYLENOL) 500 MG tablet Take 1,000 mg by mouth every 6 (six) hours as needed for Pain.      aspirin (ECOTRIN) 81 MG EC tablet Take 1 tablet (81 mg total) by mouth once daily.  0    budesonide-formoterol 160-4.5 mcg (SYMBICORT) 160-4.5 mcg/actuation HFAA Inhale 2 puffs into the lungs every 12 (twelve) hours. Controller      calcium-vitamin D3 (CALCIUM 500 + D) 500 mg(1,250mg) -200 unit per tablet Take 1 tablet by mouth 2 (two) times daily with meals.      celecoxib (CELEBREX) 100 MG capsule Take 1 capsule (100 mg total) by mouth 2 (two) times daily. 180 capsule 3    clopidogrel (PLAVIX) 75 mg tablet Take 1 tablet (75 mg total) by mouth once daily. 90 tablet 3    fluticasone (FLONASE) 50 mcg/actuation nasal spray 1 spray by Each Nare route once daily.      furosemide (LASIX) 20 MG tablet Take 1 tablet (20 mg total) by mouth once daily. 90 tablet 3    KRILL OIL ORAL Take 2,000 mg by mouth 2 (two) times daily.       lactobacillus combo no.6 4 billion cell Tab Take 1 capsule by mouth once daily at 6am.      nitroGLYCERIN (NITROSTAT) 0.3 MG SL tablet Take one tablet under tongue as needed for severe esophageal spasm---may repeat in 5 min if needed 25 tablet 2    potassium chloride SA (K-DUR,KLOR-CON) 20 MEQ tablet Take 1 tablet (20 mEq total) by mouth once daily. 90 tablet 3    RESTASIS 0.05 % ophthalmic emulsion Place 1 drop into both eyes 2 (two) times daily.      rosuvastatin (CRESTOR) 10 MG tablet Take 1 tablet (10 mg total) by mouth once daily. 90 tablet 3    VIT C/VIT E/LUTEIN/MIN/OMEGA-3 (OCUVITE ORAL) Take by mouth.       No current  facility-administered medications on file prior to visit.        Vitals:    08/16/18 0915   BP: 134/76   Pulse: 60       Physical Exam:    Physical Exam   Constitutional: She appears well-developed and well-nourished.   HENT:   Nose: No septal deviation.   Mouth/Throat: Mucous membranes are normal. No oral lesions.   Eyes: Pupils are equal, round, and reactive to light. Right conjunctiva is not injected. Left conjunctiva is not injected.   Neck: No JVD present. No thyroid mass and no thyromegaly present.   Cardiovascular: Normal rate, regular rhythm and normal pulses.   No edema   Pulmonary/Chest: Effort normal and breath sounds normal.   Abdominal: Soft. Normal appearance. There is no hepatosplenomegaly.   Musculoskeletal:        Right shoulder: She exhibits normal range of motion, no tenderness and no swelling.        Left shoulder: She exhibits normal range of motion, no tenderness and no swelling.        Right elbow: She exhibits normal range of motion and no swelling. No tenderness found.        Left elbow: She exhibits normal range of motion and no swelling. No tenderness found.        Right wrist: She exhibits normal range of motion, no tenderness and no swelling.        Left wrist: She exhibits normal range of motion, no tenderness and no swelling.        Right hip: She exhibits normal range of motion, normal strength and no swelling.        Left hip: She exhibits normal range of motion, no tenderness and no swelling.        Right knee: She exhibits normal range of motion and no swelling. No tenderness found.        Left knee: She exhibits normal range of motion and no swelling. No tenderness found.        Right ankle: She exhibits normal range of motion and no swelling. No tenderness.        Left ankle: She exhibits normal range of motion and no swelling. No tenderness.   Patient has some tenderness on the right MCP no overt synovitis no deformities.  No evidence of Raynaud's today but historical triphasic  color change  She has marked crepitation bilateral knees with limitation in flexion she has full extension no laxity within the joint no active effusion.  She has pain with restriction in internal rotation of the right hip.  No other synovitis or metatarsalgia was noted.  The   Lymphadenopathy:     She has no cervical adenopathy.     She has no axillary adenopathy.   Neurological: She has normal strength and normal reflexes.   Skin: Skin is dry and intact.   Psychiatric: She has a normal mood and affect.             Assessment:       Encounter Diagnoses   Name Primary?    Sjogren's syndrome with keratoconjunctivitis sicca Yes    Raynaud's disease without gangrene     Osteoarthritis, unspecified osteoarthritis type, unspecified site     History of CVA (cerebrovascular accident) S/P TPA     Coronary artery disease, s/p stenting LAD x 2 10/2017     Malaise and fatigue           Plan:        Sjogren's syndrome with keratoconjunctivitis sicca  -     Sedimentation rate; Future; Expected date: 08/16/2018  -     C-reactive protein; Future; Expected date: 08/16/2018  -     OSMAN; Future; Expected date: 08/16/2018  -     Anti-Smith antibody; Future; Expected date: 08/16/2018  -     C3 complement; Future; Expected date: 08/16/2018  -     C4 complement; Future; Expected date: 08/16/2018  -     Complement, total; Future; Expected date: 08/16/2018  -     Sjogrens syndrome-A extractable nuclear antibody; Future; Expected date: 08/16/2018  -     Sjogrens syndrome-B extractable nuclear antibody; Future; Expected date: 08/16/2018  -     Rheumatoid factor; Future; Expected date: 08/16/2018  -     Cyclic citrul peptide antibody, IgG; Future; Expected date: 08/16/2018    Raynaud's disease without gangrene    Osteoarthritis, unspecified osteoarthritis type, unspecified site  -     X-Ray Hand 3 View Bilateral; Future; Expected date: 08/16/2018  -     meloxicam (MOBIC) 7.5 MG tablet; Take 1 tablet (7.5 mg total) by mouth 2 (two) times  daily.  Dispense: 60 tablet; Refill: 4    History of CVA (cerebrovascular accident) S/P TPA    Coronary artery disease, s/p stenting LAD x 2 10/2017    Malaise and fatigue  -     Hepatitis panel, acute; Future; Expected date: 08/16/2018      Very pleasant 63-year-old female Sjogren's 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  Continue follow-up with ophthalmology for now.  Patient also with various joint aches and pains given her recent coronary artery disease and new stenting I have encouraged her to try meloxicam as opposed to Celebrex I think this would be a safer nonsteroidal anti-inflammatory.  Another thought would be salsalate.  We did discuss that if there is some degree of inflammatory arthritis component to her pain and we can try methotrexate.  I do encourage her to joints over seen occurs in the next month we will see how the tremors are with the program if it is not helping she would likely benefit some physical therapy directed at knee strengthening quad strengthening and hamstring and balance as she has got a rather stiff antalgic gait     Will consider Salagen in the future  No Follow-up on file.        F/u 4 months  Thank you for allowing me to participate in the care of this very pleasant patient.    60min consultation with greater than 50% spent in counseling, chart review and coordination of care. All questions answered.

## 2018-08-16 NOTE — LETTER
August 16, 2018      Padmaja Shah MD  [address] [place] [address]           [facility]  [address] [facility] [address] [place] [address]  Phone: [phone]  Fax: [phone]          Patient: Aye Montanez   MR Number: [id number]   YOB: 1955   Date of Visit: 8/16/2018       Dear Dr. Padmaja Shah:    Thank you for referring Aye Montanez to me for evaluation. Attached you will find relevant portions of my assessment and plan of care.    If you have questions, please do not hesitate to call me. I look forward to following Aye Montanez along with you.    Sincerely,    Mar yAnn Bee, DO    Enclosure  CC:  No Recipients    If you would like to receive this communication electronically, please contact [email or web address] or [phone] to request more information on [employer] access.    For providers and/or their staff who would like to refer a patient to [facility], please contact us through our one-stop-shop provider referral line, [facility] , at [phone].    If you feel you have received this communication in error or would no longer like to receive these types of communications, please e-mail [email or web address]

## 2018-09-17 ENCOUNTER — TELEPHONE (OUTPATIENT)
Dept: RHEUMATOLOGY | Facility: CLINIC | Age: 63
End: 2018-09-17

## 2018-09-17 NOTE — TELEPHONE ENCOUNTER
----- Message from Semaj Estrada sent at 9/17/2018  9:42 AM CDT -----  Type: Needs Medical Advice    Who Called:  Patient  Best Call Back Number: [phone]  Additional Information: Patient would like return call in regards to her blood work results - she is concerned about a few things. Please call to advise today.    LVM that Dr. Bee will review and she will get a return call . CG

## 2018-09-17 NOTE — TELEPHONE ENCOUNTER
Patients labs are all as expected for Sjogrens. No evidence of rheumatoid arthritis. Marker of inflammation the sed rate is a bit elevated but not worrisome.   Imaging with no overt erosive damage.   If tolerating Meloxicam can continue this and I would consider Methotrexate if joints are not fully controlled.   I do not expect Methotrexate to help with the dry eye or dry mouth symptoms unfortunately    Dr. RODRÍGUEZ

## 2018-09-18 NOTE — TELEPHONE ENCOUNTER
Called patient with result note. Patient states meloxicam is not working for joint pain. Patient to research methotrexate and return clinic's call.

## 2018-09-20 ENCOUNTER — TELEPHONE (OUTPATIENT)
Dept: ORTHOPEDICS | Facility: CLINIC | Age: 63
End: 2018-09-20

## 2018-09-20 NOTE — TELEPHONE ENCOUNTER
----- Message from Emily Lin sent at 9/20/2018  1:40 PM CDT -----  Contact: self  Call placed to pod. Returning Dasia's call, patient states she could hear Dasia talking but Dasia could not hear her. If this happens again, please call the patient right back. Call back number is [phone] (home)

## 2018-09-20 NOTE — TELEPHONE ENCOUNTER
Contacted pt. Pt requesting information on a back and spine Dr as pt believes pain down leg is caused from her back and not her knee. Advised at our [place] location there is [facility]. Provided pt with contact information to [facility] office for scheduling. Pt verbalized understanding.

## 2018-09-20 NOTE — TELEPHONE ENCOUNTER
----- Message from Emily Lin sent at 9/20/2018  9:32 AM CDT -----  Contact: self  Needs to discuss leg pain. Please call back at [phone] (home)

## 2018-09-21 ENCOUNTER — OFFICE VISIT (OUTPATIENT)
Dept: SPINE | Facility: CLINIC | Age: 63
End: 2018-09-21
Payer: MEDICARE

## 2018-09-21 VITALS
DIASTOLIC BLOOD PRESSURE: 73 MMHG | SYSTOLIC BLOOD PRESSURE: 139 MMHG | HEIGHT: 67 IN | BODY MASS INDEX: 34.81 KG/M2 | WEIGHT: 221.81 LBS | HEART RATE: 72 BPM

## 2018-09-21 DIAGNOSIS — M54.16 LUMBAR RADICULOPATHY: Primary | ICD-10-CM

## 2018-09-21 DIAGNOSIS — M79.605 LEFT LEG PAIN: ICD-10-CM

## 2018-09-21 PROCEDURE — 99214 OFFICE O/P EST MOD 30 MIN: CPT | Mod: PBBFAC,PN | Performed by: PHYSICIAN ASSISTANT

## 2018-09-21 PROCEDURE — 3008F BODY MASS INDEX DOCD: CPT | Mod: CPTII,,, | Performed by: PHYSICIAN ASSISTANT

## 2018-09-21 PROCEDURE — 99999 PR PBB SHADOW E&M-EST. PATIENT-LVL IV: CPT | Mod: PBBFAC,,, | Performed by: PHYSICIAN ASSISTANT

## 2018-09-21 PROCEDURE — 99203 OFFICE O/P NEW LOW 30 MIN: CPT | Mod: S$PBB,,, | Performed by: PHYSICIAN ASSISTANT

## 2018-09-21 PROCEDURE — 1101F PT FALLS ASSESS-DOCD LE1/YR: CPT | Mod: CPTII,,, | Performed by: PHYSICIAN ASSISTANT

## 2018-09-24 NOTE — PROGRESS NOTES
Neurosurgery History & Physical    Patient ID: Aye Montanez is a 63 y.o. female.    Chief Complaint   Patient presents with    Leg Pain     Severe left leg pain. Has has pain for 2 months. Burning sensation in left foot. Pain starts in left mid upper thigh and goes down left leg. Pain is a constant ache. Elevation and walking makes pain better. Sitting makes pain worse. Aleve and Ibuprofen takes the edge off of pain.       Review of Systems   Constitutional: Negative for activity change, appetite change, chills, fever and unexpected weight change.   HENT: Negative for tinnitus, trouble swallowing and voice change.    Respiratory: Negative for apnea, cough, chest tightness and shortness of breath.    Cardiovascular: Negative for chest pain and palpitations.   Gastrointestinal: Negative for constipation, diarrhea, nausea and vomiting.   Genitourinary: Negative for difficulty urinating, dysuria, frequency and urgency.   Musculoskeletal: Positive for myalgias. Negative for back pain, gait problem, neck pain and neck stiffness.   Skin: Negative for wound.   Neurological: Positive for numbness. Negative for dizziness, tremors, seizures, facial asymmetry, speech difficulty, weakness, light-headedness and headaches.   Psychiatric/Behavioral: Negative for confusion and decreased concentration.       Past Medical History:   Diagnosis Date    Sjogren's syndrome 12/5/2016     Social History     Socioeconomic History    Marital status:      Spouse name: Not on file    Number of children: Not on file    Years of education: Not on file    Highest education level: Not on file   Social Needs    Financial resource strain: Not on file    Food insecurity - worry: Not on file    Food insecurity - inability: Not on file    Transportation needs - medical: Not on file    Transportation needs - non-medical: Not on file   Occupational History    Not on file   Tobacco Use    Smoking status: Never Smoker    Smokeless  "tobacco: Never Used   Substance and Sexual Activity    Alcohol use: Yes     Comment: rarely    Drug use: No    Sexual activity: Yes     Partners: Male   Other Topics Concern    Not on file   Social History Narrative    Not on file     Family History   Problem Relation Age of Onset    Cancer Mother     Ovarian cancer Mother 40    Cancer Father         esophageal    Stroke Maternal Aunt     Rheum arthritis Maternal Aunt     Rheum arthritis Paternal Uncle      Review of patient's allergies indicates:   Allergen Reactions    Ceftin [cefuroxime axetil] Itching    Gabapentin      Felt like "no blood flow to my legs"    Metoprolol      Leg pain    Versed [midazolam]      "wants to come out of my skin - I get hyped"    Methylprednisolone Anxiety and Itching       Current Outpatient Medications:     acetaminophen (TYLENOL) 500 MG tablet, Take 1,000 mg by mouth every 6 (six) hours as needed for Pain., Disp: , Rfl:     calcium-vitamin D3 (CALCIUM 500 + D) 500 mg(1,250mg) -200 unit per tablet, Take 1 tablet by mouth 2 (two) times daily with meals., Disp: , Rfl:     clopidogrel (PLAVIX) 75 mg tablet, Take 1 tablet (75 mg total) by mouth once daily., Disp: 90 tablet, Rfl: 3    cyanocobalamin (VITAMIN B-12) 1,000 mcg/mL injection, Inject 1 mL (1,000 mcg total) into the skin every 30 days., Disp: 3 mL, Rfl: 3    furosemide (LASIX) 20 MG tablet, Take 1 tablet (20 mg total) by mouth once daily., Disp: 90 tablet, Rfl: 3    HYDROcodone-acetaminophen (NORCO) 5-325 mg per tablet, Take 1 tablet by mouth every 24 hours as needed for Pain., Disp: 20 tablet, Rfl: 0    KRILL OIL ORAL, Take 2,000 mg by mouth 2 (two) times daily. , Disp: , Rfl:     meloxicam (MOBIC) 7.5 MG tablet, Take 7.5 mg by mouth 2 (two) times daily., Disp: , Rfl:     nitroGLYCERIN (NITROSTAT) 0.3 MG SL tablet, Take one tablet under tongue as needed for severe esophageal spasm---may repeat in 5 min if needed, Disp: 25 tablet, Rfl: 2    potassium " "chloride SA (K-DUR,KLOR-CON) 20 MEQ tablet, Take 1 tablet (20 mEq total) by mouth once daily., Disp: 90 tablet, Rfl: 3    RESTASIS 0.05 % ophthalmic emulsion, Place 1 drop into both eyes 2 (two) times daily., Disp: , Rfl:     rosuvastatin (CRESTOR) 10 MG tablet, Take 1 tablet (10 mg total) by mouth once daily., Disp: 90 tablet, Rfl: 3    VIT C/VIT E/LUTEIN/MIN/OMEGA-3 (OCUVITE ORAL), Take by mouth., Disp: , Rfl:     Current Facility-Administered Medications:     cyanocobalamin injection 1,000 mcg, 1,000 mcg, Subcutaneous, Q30 Days, Gricel Burger MD, 1,000 mcg at 09/18/18 0830    Vitals:    09/21/18 1027   BP: 139/73   BP Location: Left arm   Patient Position: Sitting   BP Method: Large (Automatic)   Pulse: 72   Weight: 100.6 kg (221 lb 12.5 oz)   Height: 5' 7" (1.702 m)       Physical Exam   Constitutional: She is oriented to person, place, and time. She appears well-developed and well-nourished.   HENT:   Head: Normocephalic and atraumatic.   Eyes: Pupils are equal, round, and reactive to light.   Neck: Normal range of motion. Neck supple.   Cardiovascular: Normal rate.   Pulmonary/Chest: Effort normal.   Musculoskeletal: Normal range of motion. She exhibits no edema.   Neurological: She is alert and oriented to person, place, and time. She has a normal Finger-Nose-Finger Test, a normal Heel to Shin Test, a normal Romberg Test and a normal Tandem Gait Test. Gait normal.   Reflex Scores:       Tricep reflexes are 2+ on the right side and 2+ on the left side.       Bicep reflexes are 2+ on the right side and 2+ on the left side.       Brachioradialis reflexes are 2+ on the right side and 2+ on the left side.       Patellar reflexes are 2+ on the right side and 2+ on the left side.       Achilles reflexes are 2+ on the right side and 2+ on the left side.  Skin: Skin is warm, dry and intact.   Psychiatric: She has a normal mood and affect. Her speech is normal and behavior is normal. Judgment and " thought content normal.   Nursing note and vitals reviewed.      Neurologic Exam     Mental Status   Oriented to person, place, and time.   Oriented to person.   Oriented to place.   Oriented to time.   Follows 3 step commands.   Attention: normal. Concentration: normal.   Speech: speech is normal   Level of consciousness: alert  Knowledge: consistent with education.   Able to name object. Able to read. Able to repeat. Able to write. Normal comprehension.     Cranial Nerves     CN II   Visual acuity: normal  Right visual field deficit: none  Left visual field deficit: none     CN III, IV, VI   Pupils are equal, round, and reactive to light.  Right pupil: Size: 3 mm. Shape: regular. Reactivity: brisk. Consensual response: intact.   Left pupil: Size: 3 mm. Shape: regular. Reactivity: brisk. Consensual response: intact.   CN III: no CN III palsy  CN VI: no CN VI palsy  Nystagmus: none   Diplopia: none  Ophthalmoparesis: none  Conjugate gaze: present    CN V   Right facial sensation deficit: none  Left facial sensation deficit: none    CN VII   Right facial weakness: none  Left facial weakness: none    CN VIII   Hearing: intact    CN IX, X   CN IX normal.   CN X normal.     CN XI   Right sternocleidomastoid strength: normal  Left sternocleidomastoid strength: normal  Right trapezius strength: normal  Left trapezius strength: normal    CN XII   Fasciculations: absent  Tongue deviation: none    Motor Exam   Muscle bulk: normal  Overall muscle tone: normal  Right arm pronator drift: absent  Left arm pronator drift: absent    Strength   Right neck flexion: 5/5  Left neck flexion: 5/5  Right neck extension: 5/5  Left neck extension: 5/5  Right deltoid: 5/5  Left deltoid: 5/5  Right biceps: 5/5  Left biceps: 5/5  Right triceps: 5/5  Left triceps: 5/5  Right wrist flexion: 5/5  Left wrist flexion: 5/5  Right wrist extension: 5/5  Left wrist extension: 5/5  Right interossei: 5/5  Left interossei: 5/5  Right abdominals:  5/5  Left abdominals: 5/5  Right iliopsoas: 5/5  Left iliopsoas: 5/5  Right quadriceps: 5/5  Left quadriceps: 5/5  Right hamstrin/5  Left hamstrin/5  Right glutei: 5/5  Left glutei: 5/5  Right anterior tibial: 5/5  Left anterior tibial: 5/5  Right posterior tibial: 5/5  Left posterior tibial: 5/5  Right peroneal: 5/5  Left peroneal: 5/5  Right gastroc: 5/5  Left gastroc: 5/5    Sensory Exam   Right arm light touch: normal  Left arm light touch: normal  Right leg light touch: normal  Left leg light touch: normal  Right arm vibration: normal  Left arm vibration: normal  Right arm pinprick: normal  Left arm pinprick: normal  Sensory deficit distribution on left: L4    Gait, Coordination, and Reflexes     Gait  Gait: normal    Coordination   Romberg: negative  Finger to nose coordination: normal  Heel to shin coordination: normal  Tandem walking coordination: normal    Tremor   Resting tremor: absent  Intention tremor: absent  Action tremor: absent    Reflexes   Right brachioradialis: 2+  Left brachioradialis: 2+  Right biceps: 2+  Left biceps: 2+  Right triceps: 2+  Left triceps: 2+  Right patellar: 2+  Left patellar: 2+  Right achilles: 2+  Left achilles: 2+  Right Luna: absent  Left Luna: absent  Right ankle clonus: absent  Left ankle clonus: absent      Provider dictation:  63 year old female with histroy of arthritis, sjogren's and prior CVA is self referred through Dr. Ramos (no referral in system) for evaluation of left leg pain.  She describes 2 months onset of pain starting in the left mid anterior thigh to the anterior shin then the calf.  She feels soreness in the left calf.  Pain varies in intensity and affects her sleep.  It is associated with numbness in the left foot.  She has tried aleve, ibuprofen and mobic for pain.  She has not had PT or SONYA, but has been exercising with a  at they gym for 1 month.    Oswestry score: 48%.  PHQ:  16.    She has decreased sensation over the left  lateral thigh and anterior shin in an L4 distribution; otherwise, she is neurologically intact on exam.    She has not had imaging.    In conclustion, she has onset of left leg pain that may be a left L4 radiculoapthy.  She has not had any improvement with conservative measures, therefore at this time, I would like to obtain xrays and an MRI lumbar spine to assess for neural compression.  Follow up after imaging is complete.      Visit Diagnosis:  Lumbar radiculopathy  -     MRI Lumbar Spine Without Contrast; Future; Expected date: 09/21/2018  -     X-Ray Lumbar Complete With Flex And Ext; Future; Expected date: 09/21/2018    Left leg pain  -     MRI Lumbar Spine Without Contrast; Future; Expected date: 09/21/2018  -     X-Ray Lumbar Complete With Flex And Ext; Future; Expected date: 09/21/2018        Total time spent counseling greater than fifty percent of total visit time.  Counseling included discussion regarding imaging findings, diagnosis possibilities, treatment options, risks and benefits.   The patient had many questions regarding the options and long-term effects.

## 2018-11-01 PROBLEM — R94.6 NONSPECIFIC ABNORMAL RESULTS OF THYROID FUNCTION STUDY: Status: ACTIVE | Noted: 2018-11-01

## 2018-11-16 DIAGNOSIS — M19.90 ARTHRITIS: ICD-10-CM

## 2018-11-16 RX ORDER — MELOXICAM 7.5 MG/1
TABLET ORAL
Qty: 180 TABLET | Refills: 4 | Status: SHIPPED | OUTPATIENT
Start: 2018-11-16 | End: 2019-12-11 | Stop reason: SDUPTHER

## 2018-12-27 ENCOUNTER — TELEPHONE (OUTPATIENT)
Dept: RHEUMATOLOGY | Facility: CLINIC | Age: 63
End: 2018-12-27

## 2018-12-27 NOTE — TELEPHONE ENCOUNTER
----- Message from Mary Ann Bee DO sent at 12/26/2018 10:00 PM CST -----  Call patient labs ok no change in therapy.    LVM as above concerning recent lab work. CG

## 2019-01-08 ENCOUNTER — OFFICE VISIT (OUTPATIENT)
Dept: RHEUMATOLOGY | Facility: CLINIC | Age: 64
End: 2019-01-08
Payer: MEDICARE

## 2019-01-08 VITALS
WEIGHT: 222.56 LBS | SYSTOLIC BLOOD PRESSURE: 134 MMHG | HEART RATE: 80 BPM | BODY MASS INDEX: 34.93 KG/M2 | DIASTOLIC BLOOD PRESSURE: 83 MMHG | HEIGHT: 67 IN

## 2019-01-08 DIAGNOSIS — J41.1 BRONCHITIS, MUCOPURULENT RECURRENT: ICD-10-CM

## 2019-01-08 DIAGNOSIS — M35.01 SJOGREN'S SYNDROME WITH KERATOCONJUNCTIVITIS SICCA: Primary | ICD-10-CM

## 2019-01-08 PROCEDURE — 1101F PT FALLS ASSESS-DOCD LE1/YR: CPT | Mod: CPTII,S$GLB,, | Performed by: INTERNAL MEDICINE

## 2019-01-08 PROCEDURE — 99214 OFFICE O/P EST MOD 30 MIN: CPT | Mod: S$GLB,,, | Performed by: INTERNAL MEDICINE

## 2019-01-08 PROCEDURE — 3008F PR BODY MASS INDEX (BMI) DOCUMENTED: ICD-10-PCS | Mod: CPTII,S$GLB,, | Performed by: INTERNAL MEDICINE

## 2019-01-08 PROCEDURE — 99214 PR OFFICE/OUTPT VISIT, EST, LEVL IV, 30-39 MIN: ICD-10-PCS | Mod: S$GLB,,, | Performed by: INTERNAL MEDICINE

## 2019-01-08 PROCEDURE — 99999 PR PBB SHADOW E&M-EST. PATIENT-LVL III: ICD-10-PCS | Mod: PBBFAC,,, | Performed by: INTERNAL MEDICINE

## 2019-01-08 PROCEDURE — 3008F BODY MASS INDEX DOCD: CPT | Mod: CPTII,S$GLB,, | Performed by: INTERNAL MEDICINE

## 2019-01-08 PROCEDURE — 99999 PR PBB SHADOW E&M-EST. PATIENT-LVL III: CPT | Mod: PBBFAC,,, | Performed by: INTERNAL MEDICINE

## 2019-01-08 PROCEDURE — 1101F PR PT FALLS ASSESS DOC 0-1 FALLS W/OUT INJ PAST YR: ICD-10-PCS | Mod: CPTII,S$GLB,, | Performed by: INTERNAL MEDICINE

## 2019-01-08 RX ORDER — PILOCARPINE HYDROCHLORIDE 5 MG/1
5 TABLET, FILM COATED ORAL
Qty: 90 TABLET | Refills: 3 | Status: SHIPPED | OUTPATIENT
Start: 2019-01-08 | End: 2019-03-20

## 2019-01-08 NOTE — PROGRESS NOTES
Subjective:          Chief Complaint: Aye Montanez is a 63 y.o. female who had concerns including sjogrens (mouth more dryer, eyes slightly better).    HPI:    Patient is a 63-year-old female she has a history of Sjogren syndrome    She is followed by Dr. bere ferro the [facility] eye clinic.  She was taken off HCQ no active maculopathy but she was concerned about ASE.   She was on Prednisone. She has 80% improvement in joints with higher steroids.   On Restasis. Never salagen/Evozac. +punctal plugs. Did try Xiidra did not help. She did try xylimelts but having more dry mouth problems with tongue sticking. Eyes seem to be a bit. Better.       She notes burning in her eyes.  Photophobia occasional redness to her eyes.  She does have some throat spasms and a few episodes of midepigastric pain.  And she was on  hydroxychloroquine.  Patient does note a history of Raynaud's she does have some coronary artery disease with stenting of the LAD in 2017.     has a history of leukopenia and other pancytopenia is and vitamin-D deficiency.      She also has a history of osteoarthritis right hand is swollen and painful   We dc'd Celebrex for LAD stenting and started meloxicam. She clearly noted more benefit with Celebrex.   She has no strength in her hands, knees are painful. Feet burn with standing. No overt swelling of joints with the exception of left voler wrist synovial cyst.  No known ILD does have some breathing difficulty following any anesthesia. CT chest with atelectasis, subcentimeter sacttered nodules. Never seen with Pulm.   Did have proximal LAD stenting, no MI  S/p viscosupplementation in knees with ortho 3/2018  She did start with Silver Sneakers at [employer] and this was helpful, had sciatic.     Inense pruritis at night, not during day. Truncal and arms w/o rash.     Component      Latest Ref Rng & Units 8/16/2018   Anti Sm Antibody      0.00 - 19.99 EU 2.85   Anti-Sm Interpretation      Negative Negative   Anti-SSA  Antibody      0.00 - 19.99 .02 (H)   Anti-SSA Interpretation      Negative Positive (A)   Anti-SSB Antibody      0.00 - 19.99 .37 (H)   Anti-SSB Interpretation      Negative Positive (A)   Sed Rate      0 - 20 mm/Hr 33 (H)   CRP      0.0 - 8.2 mg/L 3.6   OSMAN Screen      Negative <1:160 Positive (A)   Complement (C-3)      50 - 180 mg/dL 129   Complement (C-4)      11 - 44 mg/dL 22   Complement,Total, Serum      42 - 95 U/mL 85   Rheumatoid Factor      0.0 - 15.0 IU/mL <10.0   CCP Antibodies      <5.0 U/mL <0.5     REVIEW OF SYSTEMS:    ROS            Objective:            Past Medical History:   Diagnosis Date    Sjogren's syndrome 12/5/2016     Family History   Problem Relation Age of Onset    Cancer Mother     Ovarian cancer Mother 40    Cancer Father         esophageal    Stroke Maternal Aunt     Rheum arthritis Maternal Aunt     Rheum arthritis Paternal Uncle      Social History     Tobacco Use    Smoking status: Never Smoker    Smokeless tobacco: Never Used   Substance Use Topics    Alcohol use: Yes     Comment: rarely    Drug use: No         Current Outpatient Medications on File Prior to Visit   Medication Sig Dispense Refill    acetaminophen (TYLENOL) 500 MG tablet Take 1,000 mg by mouth every 6 (six) hours as needed for Pain.      albuterol (PROVENTIL/VENTOLIN HFA) 90 mcg/actuation inhaler Inhale 2 puffs into the lungs every 4 (four) hours as needed for Wheezing. 1 Inhaler 0    albuterol-ipratropium (DUO-NEB) 2.5 mg-0.5 mg/3 mL nebulizer solution Take 3 mLs by nebulization every 6 (six) hours while awake. Rescue 1 Box 5    calcium-vitamin D3 (CALCIUM 500 + D) 500 mg(1,250mg) -200 unit per tablet Take 1 tablet by mouth 2 (two) times daily with meals.      clopidogrel (PLAVIX) 75 mg tablet Take 1 tablet (75 mg total) by mouth once daily. 90 tablet 3    cyanocobalamin (VITAMIN B-12) 1,000 mcg/mL injection Inject 1 mL (1,000 mcg total) into the skin every 30 days. 3 mL 3     "fluticasone (FLONASE) 50 mcg/actuation nasal spray USE 1 SPRAY IN EACH NOSTRIL TWICE DAILY 48 g 4    furosemide (LASIX) 20 MG tablet Take 1 tablet (20 mg total) by mouth once daily. 90 tablet 3    KRILL OIL ORAL Take 2,000 mg by mouth 2 (two) times daily.       levothyroxine (SYNTHROID) 25 MCG tablet Take 1 tablet (25 mcg total) by mouth before breakfast. 30 tablet 2    meloxicam (MOBIC) 7.5 MG tablet TAKE 1 TABLET TWICE DAILY 180 tablet 4    potassium chloride SA (K-DUR,KLOR-CON) 20 MEQ tablet Take 1 tablet (20 mEq total) by mouth once daily. 90 tablet 3    RESTASIS 0.05 % ophthalmic emulsion Place 1 drop into both eyes 2 (two) times daily.      rosuvastatin (CRESTOR) 10 MG tablet Take 1 tablet (10 mg total) by mouth once daily. 90 tablet 3    syringe with needle 3 mL 22 gauge x 3/4" Syrg 6 Syringes by Misc.(Non-Drug; Combo Route) route every 30 days. Use to inject cyanocobalamin once every 30 days.      VIT C/VIT E/LUTEIN/MIN/OMEGA-3 (OCUVITE ORAL) Take by mouth.      nitroGLYCERIN (NITROSTAT) 0.3 MG SL tablet Take one tablet under tongue as needed for severe esophageal spasm---may repeat in 5 min if needed 25 tablet 2    [DISCONTINUED] guaifenesin-codeine 100-10 mg/5 ml (CHERATUSSIN AC)  mg/5 mL syrup Take 5 mLs by mouth every 6 (six) hours as needed for Cough or Congestion. 120 mL 0    [DISCONTINUED] hydrocodone-chlorpheniramine (TUSSIONEX) 10-8 mg/5 mL suspension Take 5 mLs by mouth every 12 (twelve) hours as needed for Cough. 120 mL 0     Current Facility-Administered Medications on File Prior to Visit   Medication Dose Route Frequency Provider Last Rate Last Dose    cyanocobalamin injection 1,000 mcg  1,000 mcg Subcutaneous Q30 Days Gricel Burger MD   1,000 mcg at 09/18/18 0830       Vitals:    01/08/19 1344   BP: 134/83   Pulse: 80       Physical Exam:    Physical Exam   Constitutional: She appears well-developed and well-nourished.   HENT:   Nose: No septal deviation. "   Mouth/Throat: Mucous membranes are normal. No oral lesions.   Eyes: Pupils are equal, round, and reactive to light. Right conjunctiva is not injected. Left conjunctiva is not injected.   Neck: No JVD present. No thyroid mass and no thyromegaly present.   Cardiovascular: Normal rate, regular rhythm and normal pulses.   No edema   Pulmonary/Chest: Effort normal and breath sounds normal.   Abdominal: Soft. Normal appearance. There is no hepatosplenomegaly.   Musculoskeletal:        Right shoulder: She exhibits normal range of motion, no tenderness and no swelling.        Left shoulder: She exhibits normal range of motion, no tenderness and no swelling.        Right elbow: She exhibits normal range of motion and no swelling. No tenderness found.        Left elbow: She exhibits normal range of motion and no swelling. No tenderness found.        Right wrist: She exhibits normal range of motion, no tenderness and no swelling.        Left wrist: She exhibits normal range of motion, no tenderness and no swelling.        Right hip: She exhibits normal range of motion, normal strength and no swelling.        Left hip: She exhibits normal range of motion, no tenderness and no swelling.        Right knee: She exhibits normal range of motion and no swelling. No tenderness found.        Left knee: She exhibits normal range of motion and no swelling. No tenderness found.        Right ankle: She exhibits normal range of motion and no swelling. No tenderness.        Left ankle: She exhibits normal range of motion and no swelling. No tenderness.   Patient has some tenderness on the right MCP no overt synovitis no deformities.  No evidence of Raynaud's today but historical triphasic color change  She has marked crepitation bilateral knees with limitation in flexion she has full extension no laxity within the joint no active effusion.  She has pain with restriction in internal rotation of the right hip.  No other synovitis or  metatarsalgia was noted.  The   Lymphadenopathy:     She has no cervical adenopathy.     She has no axillary adenopathy.   Neurological: She has normal strength and normal reflexes.   Skin: Skin is dry and intact.   Psychiatric: She has a normal mood and affect.             Assessment:       Encounter Diagnoses   Name Primary?    Sjogren's syndrome with keratoconjunctivitis sicca Yes    Bronchitis, mucopurulent recurrent           Plan:        Sjogren's syndrome with keratoconjunctivitis sicca  -     pilocarpine (SALAGEN) 5 MG Tab; Take 1 tablet (5 mg total) by mouth 3 (three) times daily with meals.  Dispense: 90 tablet; Refill: 3  -     Ambulatory consult to Pulmonology    Bronchitis, mucopurulent recurrent  Comments:  low suspicion for Sjogrens associated but want to get formal PFTs   Orders:  -     Ambulatory consult to Pulmonology          Very pleasant 63-year-old female Sjogren's 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  Continue follow-up with ophthalmology for now.  Patient also with various joint aches and pains given her recent coronary artery disease and new stenting I have encouraged her to try meloxicam as opposed to Celebrex I think this would be a safer nonsteroidal anti-inflammatory.  Another thought would be salsalate.  We did discus s that if there is some degree of inflammatory arthritis component to her pain and we can try methotrexate.   -Patient with GGO 12/2017 with acute bronchitis. Repeat this year of bronchitis with SOB. Never PFTs. Given Sjogrens want Pulm eval. Low suspicion for any ILD.      if it is not helping she would likely benefit some physical therapy directed at knee strengthening quad strengthening and hamstring and balance as she has got a rather stiff antalgic gait     Try Salagen   No Follow-up on file.        F/u 4 months  Thank you for allowing me to participate in the care of this very pleasant patient.    60min consultation with greater than 50% spent in  counseling, chart review and coordination of care. All questions answered.

## 2019-04-23 DIAGNOSIS — G89.29 CHRONIC PAIN OF LEFT KNEE: ICD-10-CM

## 2019-04-23 DIAGNOSIS — M25.562 CHRONIC PAIN OF LEFT KNEE: ICD-10-CM

## 2019-04-23 DIAGNOSIS — M25.562 LEFT KNEE PAIN, UNSPECIFIED CHRONICITY: ICD-10-CM

## 2019-04-23 DIAGNOSIS — M17.12 PRIMARY OSTEOARTHRITIS OF LEFT KNEE: Primary | ICD-10-CM

## 2019-05-17 ENCOUNTER — TELEPHONE (OUTPATIENT)
Dept: RHEUMATOLOGY | Facility: CLINIC | Age: 64
End: 2019-05-17

## 2019-05-17 NOTE — TELEPHONE ENCOUNTER
----- Message from Alexandra Singh sent at 5/17/2019  7:52 AM CDT -----  Contact: self  Pt needs to reschedule due to her  being very ill.  She will not be able to make it to the appt that is scheduled.    Pt can be reached at [phone]

## 2019-05-20 ENCOUNTER — TELEPHONE (OUTPATIENT)
Dept: RHEUMATOLOGY | Facility: CLINIC | Age: 64
End: 2019-05-20

## 2019-05-20 NOTE — TELEPHONE ENCOUNTER
----- Message from Edwige Turcios sent at 5/20/2019 10:48 AM CDT -----  Contact: self  Type:  Patient Returning Call    Who Called:  self  Who Left Message for Patient:  Greta  Does the patient know what this is regarding?:  yes  Best Call Back Number:  [phone]  Additional Information:  Patient is scheduled for 5/22/19 but needs to reschedule due to 's illness. Patient would like to be seen before 10/21/19. Thanks!    Patient is cancelling as she caring for her sick . She is ok with October and she is on the wait list. CG

## 2019-06-13 ENCOUNTER — TELEPHONE (OUTPATIENT)
Dept: DERMATOLOGY | Facility: CLINIC | Age: 64
End: 2019-06-13

## 2019-06-13 NOTE — TELEPHONE ENCOUNTER
Pt contacted  . No thing available today .     ----- Message from Joey Ceballos sent at 6/13/2019 11:04 AM CDT -----  Contact: self   Type:  Same Day Appointment Request    Caller is requesting a same day appointment.  Caller declined first available appointment listed below.      Name of Caller:  Patient   When is the first available appointment? 6/24  Symptoms:  Some type of boil on left ankle   Best Call Back Number:  [phone] (home)   Additional Information:   Pt  has a appt at 1 today and she is wanting to be seen at the same time

## 2019-09-26 PROBLEM — I25.118 CORONARY ARTERY DISEASE OF NATIVE ARTERY OF NATIVE HEART WITH STABLE ANGINA PECTORIS: Status: ACTIVE | Noted: 2019-09-26

## 2019-09-26 PROBLEM — I50.9 CHRONIC CONGESTIVE HEART FAILURE: Status: ACTIVE | Noted: 2019-09-26

## 2019-10-21 ENCOUNTER — OFFICE VISIT (OUTPATIENT)
Dept: RHEUMATOLOGY | Facility: CLINIC | Age: 64
End: 2019-10-21
Payer: MEDICARE

## 2019-10-21 VITALS
BODY MASS INDEX: 32.8 KG/M2 | SYSTOLIC BLOOD PRESSURE: 135 MMHG | DIASTOLIC BLOOD PRESSURE: 74 MMHG | WEIGHT: 209.44 LBS | HEART RATE: 58 BPM

## 2019-10-21 DIAGNOSIS — M15.9 PRIMARY OSTEOARTHRITIS INVOLVING MULTIPLE JOINTS: ICD-10-CM

## 2019-10-21 DIAGNOSIS — M35.00 SJOGREN'S SYNDROME WITHOUT EXTRAGLANDULAR INVOLVEMENT: Primary | ICD-10-CM

## 2019-10-21 PROCEDURE — 99214 PR OFFICE/OUTPT VISIT, EST, LEVL IV, 30-39 MIN: ICD-10-PCS | Mod: S$GLB,,, | Performed by: INTERNAL MEDICINE

## 2019-10-21 PROCEDURE — 99999 PR PBB SHADOW E&M-EST. PATIENT-LVL III: ICD-10-PCS | Mod: PBBFAC,,, | Performed by: INTERNAL MEDICINE

## 2019-10-21 PROCEDURE — 99999 PR PBB SHADOW E&M-EST. PATIENT-LVL III: CPT | Mod: PBBFAC,,, | Performed by: INTERNAL MEDICINE

## 2019-10-21 PROCEDURE — 99214 OFFICE O/P EST MOD 30 MIN: CPT | Mod: S$GLB,,, | Performed by: INTERNAL MEDICINE

## 2019-10-21 PROCEDURE — 1101F PT FALLS ASSESS-DOCD LE1/YR: CPT | Mod: CPTII,S$GLB,, | Performed by: INTERNAL MEDICINE

## 2019-10-21 PROCEDURE — 1101F PR PT FALLS ASSESS DOC 0-1 FALLS W/OUT INJ PAST YR: ICD-10-PCS | Mod: CPTII,S$GLB,, | Performed by: INTERNAL MEDICINE

## 2019-10-21 ASSESSMENT — ROUTINE ASSESSMENT OF PATIENT INDEX DATA (RAPID3)
PSYCHOLOGICAL DISTRESS SCORE: 3.3
MDHAQ FUNCTION SCORE: .8
PAIN SCORE: 5.5
TOTAL RAPID3 SCORE: 4.39
PATIENT GLOBAL ASSESSMENT SCORE: 5

## 2019-10-21 NOTE — PROGRESS NOTES
Subjective:          Chief Complaint: Aye Montanez is a 64 y.o. female who had concerns including Follow-up (4 month).    HPI:    Patient is a 63-year-old female she has a history of Sjogren syndrome    She is followed by Dr. Fan the [facility] eye clinic.  She was taken off HCQ no active maculopathy but she was concerned about ASE.   She was on Prednisone. She has 80% improvement in joints with higher steroids.   On Restasis. Never salagen/Evozac. +punctal plugs. Did try Xiidra did not help. She did try xylimelts but having more dry mouth problems with tongue sticking. Eyes seem to be a bit. Better.   Using Mobic as of recentl.         She notes burning in her eyes.  Photophobia occasional redness to her eyes.  She does have some throat spasms and a few episodes of midepigastric pain.   Patient does note a history of Raynaud's she does have some coronary artery disease with stenting of the LAD in 2017.     has a history of leukopenia and other pancytopenia is and vitamin-D deficiency.      She also has a history of osteoarthritis right hand is swollen and painful   We dc'd Celebrex for LAD stenting and started meloxicam. She clearly noted more benefit with Celebrex.   She has no strength in her hands, knees are painful. Feet burn with standing. No overt swelling of joints with the exception of left voler wrist synovial cyst.  No known ILD does have some breathing difficulty following any anesthesia. CT chest with atelectasis, subcentimeter sacttered nodules. Never seen with Pulm.   Did have proximal LAD stenting, no MI  S/p viscosupplementation in knees with ortho 3/2018  She did start with Silver Sneakers at [employer] and this was helpful, had sciatic.     Inense pruritis at night, not during day. Truncal and arms w/o rash.     Component      Latest Ref Rng & Units 8/16/2018   Anti Sm Antibody      0.00 - 19.99 EU 2.85   Anti-Sm Interpretation      Negative Negative   Anti-SSA Antibody      0.00 - 19.99 .02 (H)    Anti-SSA Interpretation      Negative Positive (A)   Anti-SSB Antibody      0.00 - 19.99 .37 (H)   Anti-SSB Interpretation      Negative Positive (A)   Sed Rate      0 - 20 mm/Hr 33 (H)   CRP      0.0 - 8.2 mg/L 3.6   OSMAN Screen      Negative <1:160 Positive (A)   Complement (C-3)      50 - 180 mg/dL 129   Complement (C-4)      11 - 44 mg/dL 22   Complement,Total, Serum      42 - 95 U/mL 85   Rheumatoid Factor      0.0 - 15.0 IU/mL <10.0   CCP Antibodies      <5.0 U/mL <0.5     REVIEW OF SYSTEMS:    ROS            Objective:            Past Medical History:   Diagnosis Date    Sjogren's syndrome 12/5/2016     Family History   Problem Relation Age of Onset    Cancer Mother     Ovarian cancer Mother 40    Cancer Father         esophageal    Stroke Maternal Aunt     Rheum arthritis Maternal Aunt     Rheum arthritis Paternal Uncle     Breast cancer Maternal Cousin     Breast cancer Maternal Cousin      Social History     Tobacco Use    Smoking status: Never Smoker    Smokeless tobacco: Never Used   Substance Use Topics    Alcohol use: Yes     Comment: rarely    Drug use: No         Current Outpatient Medications on File Prior to Visit   Medication Sig Dispense Refill    acetaminophen (TYLENOL) 500 MG tablet Take 1,000 mg by mouth every 6 (six) hours as needed for Pain.      albuterol (PROVENTIL/VENTOLIN HFA) 90 mcg/actuation inhaler Inhale 2 puffs into the lungs every 4 (four) hours as needed for Wheezing. 1 Inhaler 0    aspirin (ECOTRIN) 81 MG EC tablet Take 1 tablet (81 mg total) by mouth once daily.  0    calcium-vitamin D3 (CALCIUM 500 + D) 500 mg(1,250mg) -200 unit per tablet Take 1 tablet by mouth 2 (two) times daily with meals.      cyanocobalamin (VITAMIN B-12) 1,000 mcg/mL injection Inject 1 mL (1,000 mcg total) into the skin every 30 days. 3 mL 3    escitalopram oxalate (LEXAPRO) 10 MG tablet Take 1 tablet (10 mg total) by mouth once daily. 90 tablet 0    furosemide (LASIX) 20 MG  "tablet Take 1 tablet (20 mg total) by mouth once daily. 90 tablet 3    KLOR-CON M20 20 mEq tablet TAKE 1 TABLET (20 MEQ TOTAL) BY MOUTH ONCE DAILY. 90 tablet 3    KRILL OIL ORAL Take 2,000 mg by mouth 2 (two) times daily.       levothyroxine (SYNTHROID) 25 MCG tablet Take 1 tablet (25 mcg total) by mouth before breakfast. 90 tablet 3    meloxicam (MOBIC) 7.5 MG tablet TAKE 1 TABLET TWICE DAILY 180 tablet 4    rosuvastatin (CRESTOR) 10 MG tablet Take 1 tablet (10 mg total) by mouth once daily. 90 tablet 3    syringe with needle 3 mL 22 gauge x 3/4" Syrg 6 Syringes by Misc.(Non-Drug; Combo Route) route every 30 days. Use to inject cyanocobalamin once every 30 days.      nitroGLYCERIN (NITROSTAT) 0.3 MG SL tablet Take one tablet under tongue as needed for severe esophageal spasm---may repeat in 5 min if needed (Patient not taking: Reported on 10/21/2019) 25 tablet 2     No current facility-administered medications on file prior to visit.        Vitals:    10/21/19 1351   BP: 135/74   Pulse: (!) 58       Physical Exam:    Physical Exam   Constitutional: She appears well-developed and well-nourished.   HENT:   Nose: No septal deviation.   Mouth/Throat: Mucous membranes are normal. No oral lesions.   Eyes: Pupils are equal, round, and reactive to light. Right conjunctiva is not injected. Left conjunctiva is not injected.   Neck: No JVD present. No thyroid mass and no thyromegaly present.   Cardiovascular: Normal rate, regular rhythm and normal pulses.   No edema   Pulmonary/Chest: Effort normal and breath sounds normal.   Abdominal: Soft. Normal appearance. There is no hepatosplenomegaly.   Musculoskeletal:        Right shoulder: She exhibits normal range of motion, no tenderness and no swelling.        Left shoulder: She exhibits normal range of motion, no tenderness and no swelling.        Right elbow: She exhibits normal range of motion and no swelling. No tenderness found.        Left elbow: She exhibits normal " range of motion and no swelling. No tenderness found.        Right wrist: She exhibits normal range of motion, no tenderness and no swelling.        Left wrist: She exhibits normal range of motion, no tenderness and no swelling.        Right hip: She exhibits normal range of motion, normal strength and no swelling.        Left hip: She exhibits normal range of motion, no tenderness and no swelling.        Right knee: She exhibits normal range of motion and no swelling. No tenderness found.        Left knee: She exhibits normal range of motion and no swelling. No tenderness found.        Right ankle: She exhibits normal range of motion and no swelling. No tenderness.        Left ankle: She exhibits normal range of motion and no swelling. No tenderness.   Patient has some tenderness on the right MCP no overt synovitis no deformities.  No evidence of Raynaud's today but historical triphasic color change  She has marked crepitation bilateral knees with limitation in flexion she has full extension no laxity within the joint no active effusion.  She has pain with restriction in internal rotation of the right hip.  No other synovitis or metatarsalgia was noted.  The   Lymphadenopathy:     She has no cervical adenopathy.     She has no axillary adenopathy.   Neurological: She has normal strength and normal reflexes.   Skin: Skin is dry and intact.   Psychiatric: She has a normal mood and affect.             Assessment:       No diagnosis found.       Plan:        There are no diagnoses linked to this encounter.      Very pleasant 63-year-old female Sjogren's 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  Continue follow-up with ophthalmology for now.  Patient also with various joint aches and pains given her recent coronary artery disease and new stenting I have encouraged her to try meloxicam as opposed to Celebrex I think this would be a safer nonsteroidal anti-inflammatory.    Another thought would be salsalate.     She  was hesitant to try salagen.    Stable on Mobic 7.5mg BID keep this    Ok for percogesic PRN breakthrough pain. Seems to be working.          if it is not helping she would likely benefit some physical therapy directed at knee strengthening quad strengthening and hamstring and balance as she has got a rather stiff antalgic gait    -pending TKA but had to reschedule twice.     Right CMC painful. deferrred injection today but can call anytime for injection if painful.       Follow up in about 4 months (around 2/21/2020).        F/u 4 months  Thank you for allowing me to participate in the care of this very pleasant patient.    30min consultation with greater than 50% spent in counseling, chart review and coordination of care. All questions answered.

## 2019-12-11 DIAGNOSIS — M19.90 ARTHRITIS: ICD-10-CM

## 2019-12-11 RX ORDER — MELOXICAM 7.5 MG/1
TABLET ORAL
Qty: 180 TABLET | Refills: 0 | Status: SHIPPED | OUTPATIENT
Start: 2019-12-11 | End: 2020-09-29

## 2020-02-17 PROBLEM — M35.00 SJOGREN'S SYNDROME WITHOUT EXTRAGLANDULAR INVOLVEMENT: Status: ACTIVE | Noted: 2020-02-17

## 2020-06-16 ENCOUNTER — TELEPHONE (OUTPATIENT)
Dept: RHEUMATOLOGY | Facility: CLINIC | Age: 65
End: 2020-06-16

## 2020-06-16 NOTE — TELEPHONE ENCOUNTER
----- Message from Ancelmo Hernandez sent at 6/16/2020 11:09 AM CDT -----  Contact: patient  Patient called in and stated she really needs to be seen asap as her appointment in March 2020 had to be cancelled due to Covid19.  Patient stated she is really suffering & would like a call back at [phone]    [facility] offering a virtual visit at 4 pm today. GE

## 2020-06-17 ENCOUNTER — TELEPHONE (OUTPATIENT)
Dept: RHEUMATOLOGY | Facility: CLINIC | Age: 65
End: 2020-06-17

## 2020-06-17 NOTE — TELEPHONE ENCOUNTER
Received incoming call from the patient. She declined offer for virtual visit yesterday because she has no capability to do this kind of visit. She has been told by Dr. Duffy to be seen soon and she is also concerned about her eyes. Her eye doctor feels Sjogren's is affecting her eyes. Please advise if early July is possible as we are booked until then. GE

## 2020-06-18 ENCOUNTER — OFFICE VISIT (OUTPATIENT)
Dept: RHEUMATOLOGY | Facility: CLINIC | Age: 65
End: 2020-06-18
Payer: MEDICARE

## 2020-06-18 VITALS
WEIGHT: 220 LBS | DIASTOLIC BLOOD PRESSURE: 78 MMHG | BODY MASS INDEX: 34.53 KG/M2 | HEIGHT: 67 IN | SYSTOLIC BLOOD PRESSURE: 138 MMHG | HEART RATE: 67 BPM

## 2020-06-18 DIAGNOSIS — M35.00 SJOGREN'S SYNDROME WITHOUT EXTRAGLANDULAR INVOLVEMENT: Primary | ICD-10-CM

## 2020-06-18 DIAGNOSIS — R53.81 MALAISE AND FATIGUE: ICD-10-CM

## 2020-06-18 DIAGNOSIS — R53.83 MALAISE AND FATIGUE: ICD-10-CM

## 2020-06-18 DIAGNOSIS — R16.1 SPLENOMEGALY: ICD-10-CM

## 2020-06-18 DIAGNOSIS — I73.00 RAYNAUD'S DISEASE WITHOUT GANGRENE: ICD-10-CM

## 2020-06-18 DIAGNOSIS — M15.9 PRIMARY OSTEOARTHRITIS INVOLVING MULTIPLE JOINTS: ICD-10-CM

## 2020-06-18 DIAGNOSIS — E53.8 FOLIC ACID DEFICIENCY: ICD-10-CM

## 2020-06-18 PROCEDURE — 99999 PR PBB SHADOW E&M-EST. PATIENT-LVL III: ICD-10-PCS | Mod: PBBFAC,,, | Performed by: INTERNAL MEDICINE

## 2020-06-18 PROCEDURE — 1125F PR PAIN SEVERITY QUANTIFIED, PAIN PRESENT: ICD-10-PCS | Mod: S$GLB,,, | Performed by: INTERNAL MEDICINE

## 2020-06-18 PROCEDURE — 1101F PT FALLS ASSESS-DOCD LE1/YR: CPT | Mod: CPTII,S$GLB,, | Performed by: INTERNAL MEDICINE

## 2020-06-18 PROCEDURE — 99215 PR OFFICE/OUTPT VISIT, EST, LEVL V, 40-54 MIN: ICD-10-PCS | Mod: S$GLB,,, | Performed by: INTERNAL MEDICINE

## 2020-06-18 PROCEDURE — 99215 OFFICE O/P EST HI 40 MIN: CPT | Mod: S$GLB,,, | Performed by: INTERNAL MEDICINE

## 2020-06-18 PROCEDURE — 1159F MED LIST DOCD IN RCRD: CPT | Mod: S$GLB,,, | Performed by: INTERNAL MEDICINE

## 2020-06-18 PROCEDURE — 99999 PR PBB SHADOW E&M-EST. PATIENT-LVL III: CPT | Mod: PBBFAC,,, | Performed by: INTERNAL MEDICINE

## 2020-06-18 PROCEDURE — 3008F BODY MASS INDEX DOCD: CPT | Mod: CPTII,S$GLB,, | Performed by: INTERNAL MEDICINE

## 2020-06-18 PROCEDURE — 1101F PR PT FALLS ASSESS DOC 0-1 FALLS W/OUT INJ PAST YR: ICD-10-PCS | Mod: CPTII,S$GLB,, | Performed by: INTERNAL MEDICINE

## 2020-06-18 PROCEDURE — 1125F AMNT PAIN NOTED PAIN PRSNT: CPT | Mod: S$GLB,,, | Performed by: INTERNAL MEDICINE

## 2020-06-18 PROCEDURE — 3008F PR BODY MASS INDEX (BMI) DOCUMENTED: ICD-10-PCS | Mod: CPTII,S$GLB,, | Performed by: INTERNAL MEDICINE

## 2020-06-18 PROCEDURE — 1159F PR MEDICATION LIST DOCUMENTED IN MEDICAL RECORD: ICD-10-PCS | Mod: S$GLB,,, | Performed by: INTERNAL MEDICINE

## 2020-06-18 RX ORDER — PILOCARPINE HYDROCHLORIDE 5 MG/1
5 TABLET, FILM COATED ORAL 2 TIMES DAILY
Qty: 60 TABLET | Refills: 11 | Status: SHIPPED | OUTPATIENT
Start: 2020-06-18 | End: 2020-07-10

## 2020-06-18 RX ORDER — CYCLOSPORINE 0.5 MG/ML
1 EMULSION OPHTHALMIC 2 TIMES DAILY
COMMUNITY
End: 2021-02-26

## 2020-06-18 RX ORDER — PILOCARPINE HYDROCHLORIDE 5 MG/1
5 TABLET, FILM COATED ORAL 3 TIMES DAILY
Qty: 90 TABLET | Refills: 2 | Status: SHIPPED | OUTPATIENT
Start: 2020-06-18 | End: 2020-06-18 | Stop reason: SDUPTHER

## 2020-06-18 RX ORDER — METHOTREXATE 2.5 MG/1
10 TABLET ORAL
Qty: 16 TABLET | Refills: 2 | Status: SHIPPED | OUTPATIENT
Start: 2020-06-18 | End: 2020-06-18 | Stop reason: SDUPTHER

## 2020-06-18 RX ORDER — METHOTREXATE 2.5 MG/1
10 TABLET ORAL
Qty: 16 TABLET | Refills: 2 | Status: SHIPPED | OUTPATIENT
Start: 2020-06-18 | End: 2020-09-29

## 2020-06-18 RX ORDER — FOLIC ACID 1 MG/1
1 TABLET ORAL DAILY
Qty: 100 TABLET | Refills: 3 | Status: SHIPPED | OUTPATIENT
Start: 2020-06-18 | End: 2020-09-29

## 2020-06-18 RX ORDER — FOLIC ACID 1 MG/1
1 TABLET ORAL DAILY
Qty: 100 TABLET | Refills: 3 | Status: SHIPPED | OUTPATIENT
Start: 2020-06-18 | End: 2020-06-18 | Stop reason: SDUPTHER

## 2020-06-18 RX ORDER — PILOCARPINE HYDROCHLORIDE 5 MG/1
5 TABLET, FILM COATED ORAL 3 TIMES DAILY
Qty: 90 TABLET | Refills: 2 | Status: SHIPPED | OUTPATIENT
Start: 2020-06-18 | End: 2020-09-29

## 2020-06-18 ASSESSMENT — ROUTINE ASSESSMENT OF PATIENT INDEX DATA (RAPID3)
MDHAQ FUNCTION SCORE: 1.3
PATIENT GLOBAL ASSESSMENT SCORE: 7
TOTAL RAPID3 SCORE: 6.11
PSYCHOLOGICAL DISTRESS SCORE: 3.3
PAIN SCORE: 7

## 2020-06-18 NOTE — PROGRESS NOTES
Subjective:          Chief Complaint: Aye Montanez is a 65 y.o. female who had concerns including Disease Management (sjogren's syndrome ).    HPI:    Patient is a 65-year-old female she has a history of Sjogren syndrome   Here for urgent visit:     She notes burning in her eyes.  Photophobia occasional redness to her eyes. Seen w/ Dr. Fan earlier this month with worsening eye pain, flashes, restarted restasis as had been off. Received punctal plugs.  Patient also seen with Pulmonology- PFTs stable no diffusion loss.   She is on singulair and now claritin. Illness early spring with doxy, nebs. Now that this is improving with worsening fatigue, joint pain. Notes her joints are worse in the morning but does improve with activity.        She is followed by Dr. Fan the [facility].  She was taken off HCQ no active maculopathy but she was concerned about ASE.   She was on Prednisone. She has 80% improvement in joints with higher steroids.   Off Restasis for about 1 year with using krill oil.    Never salagen/Evozac. +punctal plugs. Did try Xiidra did not help. She did try xylimelts but having more dry mouth problems with tongue sticking. Eyes seem to be a bit. Better.   Using Mobic as of recentl.         She does have some throat spasms and a few episodes of midepigastric pain.   Patient does note a history of Raynaud's she does have some coronary artery disease with stenting of the LAD in 2017.     has a history of leukopenia and other pancytopenia is and vitamin-D deficiency.        We dc'd Celebrex for LAD stenting and started meloxicam. She clearly noted more benefit with Celebrex.   She has no strength in her hands, knees are painful. Feet burn with standing. No overt swelling of joints with the exception of left voler wrist synovial cyst.  No known ILD does have some breathing difficulty following any anesthesia. CT chest with atelectasis, subcentimeter sacttered nodules. Never seen with Pulm.   Did  have proximal LAD stenting, no MI  S/p viscosupplementation in knees with ortho 3/2018  She did start with Silver Sneakers at Gym and this was helpful, had sciatic.     Inense pruritis at night, not during day. Truncal and arms w/o rash.     Component      Latest Ref Rng & Units 8/16/2018   Anti Sm Antibody      0.00 - 19.99 EU 2.85   Anti-Sm Interpretation      Negative Negative   Anti-SSA Antibody      0.00 - 19.99 .02 (H)   Anti-SSA Interpretation      Negative Positive (A)   Anti-SSB Antibody      0.00 - 19.99 .37 (H)   Anti-SSB Interpretation      Negative Positive (A)   Sed Rate      0 - 20 mm/Hr 33 (H)   CRP      0.0 - 8.2 mg/L 3.6   OSMAN Screen      Negative <1:160 Positive (A)   Complement (C-3)      50 - 180 mg/dL 129   Complement (C-4)      11 - 44 mg/dL 22   Complement,Total, Serum      42 - 95 U/mL 85   Rheumatoid Factor      0.0 - 15.0 IU/mL <10.0   CCP Antibodies      <5.0 U/mL <0.5     REVIEW OF SYSTEMS:    Review of Systems   Constitutional: Positive for malaise/fatigue. Negative for fever and weight loss.   HENT: Negative for sore throat.    Eyes: Negative for double vision, photophobia and redness.   Respiratory: Negative for cough, shortness of breath and wheezing.    Cardiovascular: Negative for chest pain, palpitations and orthopnea.   Gastrointestinal: Negative for abdominal pain, constipation and diarrhea.   Genitourinary: Negative for dysuria, hematuria and urgency.   Musculoskeletal: Positive for joint pain. Negative for back pain and myalgias.   Skin: Positive for itching. Negative for rash.   Neurological: Negative for dizziness, tingling, focal weakness and headaches.   Endo/Heme/Allergies: Does not bruise/bleed easily.   Psychiatric/Behavioral: Negative for depression, hallucinations and suicidal ideas.               Objective:            Past Medical History:   Diagnosis Date    Sjogren's syndrome 12/5/2016     Family History   Problem Relation Age of Onset    Cancer  Mother     Ovarian cancer Mother 40    Cancer Father         esophageal    Stroke Maternal Aunt     Rheum arthritis Maternal Aunt     Rheum arthritis Paternal Uncle     Breast cancer Maternal Cousin     Breast cancer Maternal Cousin      Social History     Tobacco Use    Smoking status: Never Smoker    Smokeless tobacco: Never Used   Substance Use Topics    Alcohol use: Yes     Comment: rarely    Drug use: No         Current Outpatient Medications on File Prior to Visit   Medication Sig Dispense Refill    acetaminophen (TYLENOL) 500 MG tablet Take 1,000 mg by mouth every 6 (six) hours as needed for Pain.      albuterol (ACCUNEB) 1.25 mg/3 mL Nebu Take 6 mLs (2.5 mg total) by nebulization every 6 (six) hours as needed. Rescue 1 Box 5    albuterol (PROVENTIL/VENTOLIN HFA) 90 mcg/actuation inhaler Inhale 2 puffs into the lungs every 4 (four) hours as needed for Wheezing. 1 Inhaler 0    aspirin (ECOTRIN) 81 MG EC tablet Take 1 tablet (81 mg total) by mouth once daily.  0    budesonide-formoterol 80-4.5 mcg (SYMBICORT) 80-4.5 mcg/actuation HFAA Inhale 2 puffs into the lungs 2 (two) times daily. Controller 3 Inhaler 3    cyanocobalamin (VITAMIN B-12) 1,000 mcg/mL injection Inject 1 mL (1,000 mcg total) into the skin every 30 days. 3 mL 3    cycloSPORINE (RESTASIS) 0.05 % ophthalmic emulsion Place 1 drop into both eyes 2 (two) times daily.      escitalopram oxalate (LEXAPRO) 10 MG tablet Take 1 tablet (10 mg total) by mouth once daily. 90 tablet 2    furosemide (LASIX) 20 MG tablet TAKE 1 TABLET EVERY DAY 90 tablet 3    levothyroxine (SYNTHROID) 25 MCG tablet Take 1 tablet (25 mcg total) by mouth before breakfast. 90 tablet 3    loratadine (CLARITIN) 10 mg tablet Take 1 tablet (10 mg total) by mouth once daily. 90 tablet 3    meloxicam (MOBIC) 7.5 MG tablet TAKE 1 TABLET TWICE DAILY 180 tablet 0    montelukast (SINGULAIR) 10 mg tablet Take 1 tablet (10 mg total) by mouth every evening. 90 tablet 3  "   nitroGLYCERIN (NITROSTAT) 0.3 MG SL tablet Take one tablet under tongue as needed for severe esophageal spasm---may repeat in 5 min if needed 25 tablet 2    omeprazole (PRILOSEC) 20 MG capsule Take 1 capsule (20 mg total) by mouth once daily. 90 capsule 3    potassium chloride SA (K-DUR,KLOR-CON) 20 MEQ tablet TAKE 1 TABLET EVERY DAY 90 tablet 3    calcium-vitamin D3 (CALCIUM 500 + D) 500 mg(1,250mg) -200 unit per tablet Take 1 tablet by mouth 2 (two) times daily with meals.      KRILL OIL ORAL Take 2,000 mg by mouth 2 (two) times daily.       rosuvastatin (CRESTOR) 10 MG tablet TAKE 1 TABLET EVERY DAY 90 tablet 3    syringe with needle 3 mL 22 gauge x 3/4" Syrg 6 Syringes by Misc.(Non-Drug; Combo Route) route every 30 days. Use to inject cyanocobalamin once every 30 days.       Current Facility-Administered Medications on File Prior to Visit   Medication Dose Route Frequency Provider Last Rate Last Dose    methylPREDNISolone sod suc(PF) injection 80 mg  80 mg Intramuscular Once Padmaja Shah MD           Vitals:    06/18/20 1456   BP: 138/78   Pulse: 67       Physical Exam:    Physical Exam  Constitutional:       Appearance: Normal appearance. She is well-developed.   HENT:      Nose: No septal deviation.      Mouth/Throat:      Mouth: No oral lesions.   Eyes:      Conjunctiva/sclera:      Right eye: Right conjunctiva is not injected.      Left eye: Left conjunctiva is not injected.      Pupils: Pupils are equal, round, and reactive to light.   Neck:      Thyroid: No thyroid mass or thyromegaly.      Vascular: No JVD.   Cardiovascular:      Rate and Rhythm: Normal rate and regular rhythm.      Pulses: Normal pulses.      Comments: No edema  Pulmonary:      Effort: Pulmonary effort is normal.      Breath sounds: Normal breath sounds.   Abdominal:      Palpations: Abdomen is soft.   Musculoskeletal:      Right shoulder: She exhibits normal range of motion, no tenderness and no swelling.      Left " shoulder: She exhibits normal range of motion, no tenderness and no swelling.      Right elbow: She exhibits normal range of motion and no swelling. No tenderness found.      Left elbow: She exhibits normal range of motion and no swelling. No tenderness found.      Right wrist: She exhibits tenderness. She exhibits normal range of motion and no swelling.      Left wrist: She exhibits tenderness. She exhibits normal range of motion and no swelling.      Right hip: She exhibits normal range of motion, normal strength and no swelling.      Left hip: She exhibits normal range of motion, no tenderness and no swelling.      Right knee: She exhibits normal range of motion and no swelling. No tenderness found.      Left knee: She exhibits normal range of motion and no swelling. No tenderness found.      Right ankle: She exhibits normal range of motion and no swelling. No tenderness.      Left ankle: She exhibits normal range of motion and no swelling. No tenderness.      Right hand: She exhibits tenderness.      Left hand: She exhibits tenderness.      Comments: Patient has some tenderness on the right MCP no overt synovitis no deformities.  No evidence of Raynaud's today but historical triphasic color change  She has marked crepitation bilateral knees with limitation in flexion she has full extension no laxity within the joint no active effusion.  She has pain with restriction in internal rotation of the right hip.  No other synovitis or metatarsalgia was noted.  The   Lymphadenopathy:      Cervical: No cervical adenopathy.   Skin:     General: Skin is dry.   Neurological:      Deep Tendon Reflexes: Reflexes are normal and symmetric.               Assessment:       Encounter Diagnoses   Name Primary?    Sjogren's syndrome without extraglandular involvement Yes    Primary osteoarthritis involving multiple joints     Malaise and fatigue     Folic acid deficiency     Raynaud's disease without gangrene     Splenomegaly            Plan:        Sjogren's syndrome without extraglandular involvement  -     Sedimentation rate; Future; Expected date: 06/18/2020  -     C-Reactive Protein; Future; Expected date: 06/18/2020  -     CBC auto differential; Future; Expected date: 06/18/2020  -     Comprehensive metabolic panel; Future; Expected date: 06/18/2020  -     pilocarpine (SALAGEN) 5 MG Tab; Take 1 tablet (5 mg total) by mouth 3 (three) times daily.  Dispense: 90 tablet; Refill: 2  -     methotrexate 2.5 MG Tab; Take 4 tablets (10 mg total) by mouth every 7 days.  Dispense: 16 tablet; Refill: 2  -     folic acid (FOLVITE) 1 MG tablet; Take 1 tablet (1 mg total) by mouth once daily.  Dispense: 100 tablet; Refill: 3    Primary osteoarthritis involving multiple joints    Malaise and fatigue  -     Hepatitis Panel, Acute; Future; Expected date: 06/18/2020  -     CBC auto differential; Future; Expected date: 06/18/2020  -     Comprehensive metabolic panel; Future; Expected date: 06/18/2020    Folic acid deficiency    Raynaud's disease without gangrene    Splenomegaly          Very pleasant 63-year-old female Sjogren's 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  Now with inflamed painful eyes, joint swelling and stiffness.    She was hesitant to try salagen. Start now.    Start Methotrexate 10mg daily   Stable on Mobic 7.5mg BID keep this    Ok for percogesic PRN breakthrough pain. Seems to be working.     We also reviewed the risks benefits and monitoring requirements of methotrexate therapy; not limited to weekly dosing, monitoring requirements, daily folic acid the avoidance of alcohol and the potential abortifacient and teratogenic nature.   Minor elevations in aminotransferases are common, but hepatic steatosis, fibrosis, and cirrhosis may infrequently occur. Routine use of daily folic acid supplementation, which is associated with a reduced risk of hepatic transaminase elevations. Pulmonary toxicity of MTX is seen with both high- and  low-dose treatment .MTX is an immunomodulatory but not significantly immunosuppressive agent; it is not associated with opportunistic infections in the overwhelming majority of patients. Hematologic toxicity is possible, but a more serious abnormality is the development of pancytopenia. Lymphoproliferative disorders occur with increased frequency in RA, independent of specific therapies, but MTX therapy may increase such risk        Follow up in about 4 months (around 10/18/2020).        F/u 4 months  Thank you for allowing me to participate in the care of this very pleasant patient.    40min consultation with greater than 50% spent in counseling, chart review and coordination of care. All questions answered.

## 2020-07-20 ENCOUNTER — TELEPHONE (OUTPATIENT)
Dept: RHEUMATOLOGY | Facility: CLINIC | Age: 65
End: 2020-07-20

## 2020-07-20 DIAGNOSIS — M15.9 PRIMARY OSTEOARTHRITIS INVOLVING MULTIPLE JOINTS: Primary | ICD-10-CM

## 2020-07-20 DIAGNOSIS — M35.00 SJOGREN'S SYNDROME WITHOUT EXTRAGLANDULAR INVOLVEMENT: ICD-10-CM

## 2020-07-20 NOTE — TELEPHONE ENCOUNTER
----- Message from Mary Ann Bee DO sent at 7/19/2020  6:48 PM CDT -----  Patient needs repeat [place] 4 labs in 8 weeks from the last. Please set up. Dr. RODRÍGUEZ

## 2020-07-20 NOTE — TELEPHONE ENCOUNTER
Dr. Bee,  I have pended those labs for your signature and then I can call and schedule her.  Thanks.    Latoya Chavez MA  7/20/2020

## 2020-08-10 ENCOUNTER — TELEPHONE (OUTPATIENT)
Dept: RHEUMATOLOGY | Facility: CLINIC | Age: 65
End: 2020-08-10

## 2020-08-10 NOTE — TELEPHONE ENCOUNTER
----- Message from Jennifer Brantley sent at 8/10/2020  8:57 AM CDT -----  Contact: pt  Pt states that she does her labs at the [facility] not at [facility] so she is wanting the nurse to send the orders there,please...[phone] (home)     LVM that all orders are visible to [facility] and she can go as a walk in, non fasting. CG

## 2020-09-29 PROBLEM — E03.9 ACQUIRED HYPOTHYROIDISM: Status: ACTIVE | Noted: 2020-09-29

## 2020-09-29 PROBLEM — E53.8 VITAMIN B12 DEFICIENCY: Status: ACTIVE | Noted: 2020-09-29

## 2020-09-29 PROBLEM — E66.811 CLASS 1 OBESITY DUE TO EXCESS CALORIES WITH SERIOUS COMORBIDITY AND BODY MASS INDEX (BMI) OF 32.0 TO 32.9 IN ADULT: Status: ACTIVE | Noted: 2020-09-29

## 2020-09-29 PROBLEM — E66.09 CLASS 1 OBESITY DUE TO EXCESS CALORIES WITH SERIOUS COMORBIDITY AND BODY MASS INDEX (BMI) OF 32.0 TO 32.9 IN ADULT: Status: ACTIVE | Noted: 2020-09-29

## 2020-10-08 ENCOUNTER — PATIENT MESSAGE (OUTPATIENT)
Dept: RHEUMATOLOGY | Facility: CLINIC | Age: 65
End: 2020-10-08

## 2020-10-08 DIAGNOSIS — M35.00 SJOGREN'S SYNDROME WITHOUT EXTRAGLANDULAR INVOLVEMENT: Primary | ICD-10-CM

## 2020-10-08 DIAGNOSIS — D72.819 LEUKOPENIA, UNSPECIFIED TYPE: ICD-10-CM

## 2020-10-08 DIAGNOSIS — Z79.899 HIGH RISK MEDICATIONS (NOT ANTICOAGULANTS) LONG-TERM USE: ICD-10-CM

## 2020-10-09 PROBLEM — I50.32 CHRONIC DIASTOLIC CONGESTIVE HEART FAILURE: Status: ACTIVE | Noted: 2019-09-26

## 2020-10-09 PROBLEM — E78.00 HYPERCHOLESTEROLEMIA: Status: ACTIVE | Noted: 2020-10-09

## 2020-10-09 NOTE — TELEPHONE ENCOUNTER
Ms. Tarik,     I wanted to check in on how you are feeling with Methotrexate. Yoour labs were overall ok but white blood cells did fall slightly. They are chronically low but we do not want Methotrexate to cause them to drop more.  I want to decrease the methotrexate to 3 tablets weekly   And  I want to repeat just the CBC right before our next visit in November.   [facility] will be able to see the new order you can go 1 day prior or even few days prior.   Dr. Bee       Staff please make sure this message is read by patient

## 2020-11-04 ENCOUNTER — OFFICE VISIT (OUTPATIENT)
Dept: RHEUMATOLOGY | Facility: CLINIC | Age: 65
End: 2020-11-04
Payer: MEDICARE

## 2020-11-04 ENCOUNTER — PATIENT MESSAGE (OUTPATIENT)
Dept: RHEUMATOLOGY | Facility: CLINIC | Age: 65
End: 2020-11-04

## 2020-11-04 VITALS
HEIGHT: 67 IN | DIASTOLIC BLOOD PRESSURE: 70 MMHG | BODY MASS INDEX: 32.39 KG/M2 | WEIGHT: 206.38 LBS | SYSTOLIC BLOOD PRESSURE: 120 MMHG | HEART RATE: 70 BPM

## 2020-11-04 DIAGNOSIS — D72.819 LEUKOPENIA, UNSPECIFIED TYPE: ICD-10-CM

## 2020-11-04 DIAGNOSIS — M35.00 SJOGREN'S SYNDROME WITHOUT EXTRAGLANDULAR INVOLVEMENT: Primary | ICD-10-CM

## 2020-11-04 DIAGNOSIS — M32.13 OTHER SYSTEMIC LUPUS ERYTHEMATOSUS WITH LUNG INVOLVEMENT: ICD-10-CM

## 2020-11-04 PROCEDURE — 3008F PR BODY MASS INDEX (BMI) DOCUMENTED: ICD-10-PCS | Mod: CPTII,S$GLB,, | Performed by: INTERNAL MEDICINE

## 2020-11-04 PROCEDURE — 3008F BODY MASS INDEX DOCD: CPT | Mod: CPTII,S$GLB,, | Performed by: INTERNAL MEDICINE

## 2020-11-04 PROCEDURE — 99215 OFFICE O/P EST HI 40 MIN: CPT | Mod: S$GLB,,, | Performed by: INTERNAL MEDICINE

## 2020-11-04 PROCEDURE — 1125F PR PAIN SEVERITY QUANTIFIED, PAIN PRESENT: ICD-10-PCS | Mod: S$GLB,,, | Performed by: INTERNAL MEDICINE

## 2020-11-04 PROCEDURE — 3288F FALL RISK ASSESSMENT DOCD: CPT | Mod: CPTII,S$GLB,, | Performed by: INTERNAL MEDICINE

## 2020-11-04 PROCEDURE — 99999 PR PBB SHADOW E&M-EST. PATIENT-LVL III: CPT | Mod: PBBFAC,,, | Performed by: INTERNAL MEDICINE

## 2020-11-04 PROCEDURE — 3288F PR FALLS RISK ASSESSMENT DOCUMENTED: ICD-10-PCS | Mod: CPTII,S$GLB,, | Performed by: INTERNAL MEDICINE

## 2020-11-04 PROCEDURE — 1100F PR PT FALLS ASSESS DOC 2+ FALLS/FALL W/INJURY/YR: ICD-10-PCS | Mod: CPTII,S$GLB,, | Performed by: INTERNAL MEDICINE

## 2020-11-04 PROCEDURE — 1159F MED LIST DOCD IN RCRD: CPT | Mod: S$GLB,,, | Performed by: INTERNAL MEDICINE

## 2020-11-04 PROCEDURE — 99999 PR PBB SHADOW E&M-EST. PATIENT-LVL III: ICD-10-PCS | Mod: PBBFAC,,, | Performed by: INTERNAL MEDICINE

## 2020-11-04 PROCEDURE — 1100F PTFALLS ASSESS-DOCD GE2>/YR: CPT | Mod: CPTII,S$GLB,, | Performed by: INTERNAL MEDICINE

## 2020-11-04 PROCEDURE — 1159F PR MEDICATION LIST DOCUMENTED IN MEDICAL RECORD: ICD-10-PCS | Mod: S$GLB,,, | Performed by: INTERNAL MEDICINE

## 2020-11-04 PROCEDURE — 99215 PR OFFICE/OUTPT VISIT, EST, LEVL V, 40-54 MIN: ICD-10-PCS | Mod: S$GLB,,, | Performed by: INTERNAL MEDICINE

## 2020-11-04 PROCEDURE — 1125F AMNT PAIN NOTED PAIN PRSNT: CPT | Mod: S$GLB,,, | Performed by: INTERNAL MEDICINE

## 2020-11-04 ASSESSMENT — ROUTINE ASSESSMENT OF PATIENT INDEX DATA (RAPID3)
PAIN SCORE: 5
MDHAQ FUNCTION SCORE: 0.9
PATIENT GLOBAL ASSESSMENT SCORE: 5
TOTAL RAPID3 SCORE: 4.33
PSYCHOLOGICAL DISTRESS SCORE: 1.1

## 2020-11-04 NOTE — PROGRESS NOTES
Subjective:          Chief Complaint: Aye Montanez is a 65 y.o. female who had concerns including Disease Management.    HPI:    Patient is a 65-year-old female she has a history of Sjogren syndrome (estimate start 2015)       She notes burning in her eyes.  Photophobia occasional redness to her eyes. Seen w/ Dr. Fan earlier this month with worsening eye pain, flashes, restarted restasis as had been off. Received punctal plugs.  Patient also seen with Pulmonology- PFTs stable no diffusion loss.   She is on singulair and now claritin. Illness early spring with doxy, nebs. Now that this is improving with worsening fatigue, joint pain. Notes her joints are worse in the morning but does improve with activity.        She is followed by Dr. Fan the [facility].  She was taken off HCQ no active maculopathy but she was concerned about ASE.   She was on Prednisone. She has 80% improvement in joints with higher steroids.   Restasis despite burning back on  +punctal plugs. Did try Xiidra did not help   Never salagen/Evozac. . She did try xylimelts but having more dry mouth problems with tongue sticking. Eyes seem to be a bit. Better.   Using Mobic as of recentl.   Eyes continue to be problematic.   Stiffness of many joints   Sticking with autoimmune diety since May 2020 and doing well with this.   She           She does have some throat spasms and a few episodes of midepigastric pain.   Patient does note a history of Raynaud's she does have some coronary artery disease with stenting of the LAD in 2017.     has a history of leukopenia and other pancytopenia is and vitamin-D deficiency.        We dc'd Celebrex for LAD stenting and started meloxicam. She clearly noted more benefit with Celebrex.   She has no strength in her hands, knees are painful. Feet burn with standing. No overt swelling of joints with the exception of left voler wrist synovial cyst.  No known ILD does have some breathing difficulty following  any anesthesia. CT chest with atelectasis, subcentimeter sacttered nodules. Never seen with Pulm.   Did have proximal LAD stenting, no MI  S/p viscosupplementation in knees with ortho 3/2018  She did start with Silver Sneakers at Gym and this was helpful, had sciatic.     Inense pruritis at night, not during day. Truncal and arms w/o rash.     Component      Latest Ref Rng & Units 8/16/2018   Anti Sm Antibody      0.00 - 19.99 EU 2.85   Anti-Sm Interpretation      Negative Negative   Anti-SSA Antibody      0.00 - 19.99 .02 (H)   Anti-SSA Interpretation      Negative Positive (A)   Anti-SSB Antibody      0.00 - 19.99 .37 (H)   Anti-SSB Interpretation      Negative Positive (A)   Sed Rate      0 - 20 mm/Hr 33 (H)   CRP      0.0 - 8.2 mg/L 3.6   OSMAN Screen      Negative <1:160 Positive (A)   Complement (C-3)      50 - 180 mg/dL 129   Complement (C-4)      11 - 44 mg/dL 22   Complement,Total, Serum      42 - 95 U/mL 85   Rheumatoid Factor      0.0 - 15.0 IU/mL <10.0   CCP Antibodies      <5.0 U/mL <0.5     REVIEW OF SYSTEMS:    Review of Systems   Constitutional: Positive for malaise/fatigue. Negative for fever and weight loss.   HENT: Negative for sore throat.    Eyes: Negative for double vision, photophobia and redness.   Respiratory: Negative for cough, shortness of breath and wheezing.    Cardiovascular: Negative for chest pain, palpitations and orthopnea.   Gastrointestinal: Negative for abdominal pain, constipation and diarrhea.   Genitourinary: Negative for dysuria, hematuria and urgency.   Musculoskeletal: Positive for joint pain. Negative for back pain and myalgias.   Skin: Positive for itching. Negative for rash.   Neurological: Negative for dizziness, tingling, focal weakness and headaches.   Endo/Heme/Allergies: Does not bruise/bleed easily.   Psychiatric/Behavioral: Negative for depression, hallucinations and suicidal ideas.               Objective:            Past Medical History:   Diagnosis  "Date    Sjogren's syndrome 12/5/2016     Family History   Problem Relation Age of Onset    Cancer Mother     Ovarian cancer Mother 40    Cancer Father         esophageal    Stroke Maternal Aunt     Rheum arthritis Maternal Aunt     Rheum arthritis Paternal Uncle     Breast cancer Maternal Cousin     Breast cancer Maternal Cousin      Social History     Tobacco Use    Smoking status: Never Smoker    Smokeless tobacco: Never Used   Substance Use Topics    Alcohol use: Yes     Comment: rarely    Drug use: No         Current Outpatient Medications on File Prior to Visit   Medication Sig Dispense Refill    acetaminophen (TYLENOL) 500 MG tablet Take 1,000 mg by mouth every 6 (six) hours as needed for Pain.      calcium-vitamin D3 (CALCIUM 500 + D) 500 mg(1,250mg) -200 unit per tablet Take 1 tablet by mouth 2 (two) times daily with meals.      cyanocobalamin (VITAMIN B-12) 1,000 mcg/mL injection Inject 1 mL (1,000 mcg total) into the skin every 30 days. 3 mL 3    cycloSPORINE (RESTASIS) 0.05 % ophthalmic emulsion Place 1 drop into both eyes 2 (two) times daily.      furosemide (LASIX) 20 MG tablet Take 1 tablet (20 mg total) by mouth once daily. 90 tablet 3    levothyroxine (SYNTHROID) 25 MCG tablet TAKE 1 TABLET EVERY DAY BEFORE BREAKFAST 90 tablet 2    montelukast (SINGULAIR) 10 mg tablet Take 1 tablet (10 mg total) by mouth every evening. 90 tablet 3    omeprazole (PRILOSEC) 20 MG capsule Take 1 capsule (20 mg total) by mouth once daily. 90 capsule 3    potassium chloride SA (K-DUR,KLOR-CON) 20 MEQ tablet TAKE 1 TABLET EVERY DAY 90 tablet 3    rosuvastatin (CRESTOR) 10 MG tablet Take 1 tablet (10 mg total) by mouth once daily. 90 tablet 3    syringe with needle 3 mL 22 gauge x 3/4" Syrg 6 Syringes by Misc.(Non-Drug; Combo Route) route every 30 days. Use to inject cyanocobalamin once every 30 days.      albuterol (ACCUNEB) 1.25 mg/3 mL Nebu Take 6 mLs (2.5 mg total) by nebulization every 6 (six) " hours as needed. Rescue (Patient not taking: Reported on 11/4/2020) 1 Box 5    aspirin (ECOTRIN) 81 MG EC tablet Take 1 tablet (81 mg total) by mouth once daily.  0    KRILL OIL ORAL Take 2,000 mg by mouth 2 (two) times daily.        No current facility-administered medications on file prior to visit.        Vitals:    11/04/20 1130   BP: 120/70   Pulse: 70       Physical Exam:    Physical Exam  Constitutional:       Appearance: Normal appearance. She is well-developed.   HENT:      Nose: No septal deviation.      Mouth/Throat:      Mouth: No oral lesions.   Eyes:      Conjunctiva/sclera:      Right eye: Right conjunctiva is not injected.      Left eye: Left conjunctiva is not injected.      Pupils: Pupils are equal, round, and reactive to light.   Neck:      Thyroid: No thyroid mass or thyromegaly.      Vascular: No JVD.   Cardiovascular:      Rate and Rhythm: Normal rate and regular rhythm.      Pulses: Normal pulses.      Comments: No edema  Pulmonary:      Effort: Pulmonary effort is normal.      Breath sounds: Normal breath sounds.   Abdominal:      Palpations: Abdomen is soft.   Musculoskeletal:      Right shoulder: She exhibits normal range of motion, no tenderness and no swelling.      Left shoulder: She exhibits normal range of motion, no tenderness and no swelling.      Right elbow: She exhibits normal range of motion and no swelling. No tenderness found.      Left elbow: She exhibits normal range of motion and no swelling. No tenderness found.      Right wrist: She exhibits tenderness. She exhibits normal range of motion and no swelling.      Left wrist: She exhibits tenderness. She exhibits normal range of motion and no swelling.      Right hip: She exhibits normal range of motion, normal strength and no swelling.      Left hip: She exhibits normal range of motion, no tenderness and no swelling.      Right knee: She exhibits normal range of motion and no swelling. No tenderness found.      Left knee:  She exhibits normal range of motion and no swelling. No tenderness found.      Right ankle: She exhibits normal range of motion and no swelling. No tenderness.      Left ankle: She exhibits normal range of motion and no swelling. No tenderness.      Right hand: She exhibits tenderness.      Left hand: She exhibits tenderness.      Comments: Patient has some tenderness on the right MCP no overt synovitis no deformities.  No evidence of Raynaud's today but historical triphasic color change  She has marked crepitation bilateral knees with limitation in flexion she has full extension no laxity within the joint no active effusion.  She has pain with restriction in internal rotation of the right hip.  No other synovitis or metatarsalgia was noted.  The   Lymphadenopathy:      Cervical: No cervical adenopathy.   Skin:     General: Skin is dry.   Neurological:      Deep Tendon Reflexes: Reflexes are normal and symmetric.               Assessment:       Encounter Diagnoses   Name Primary?    Sjogren's syndrome without extraglandular involvement Yes    Leukopenia, unspecified type     Other systemic lupus erythematosus with lung involvement           Plan:        Sjogren's syndrome without extraglandular involvement  -     OSMAN Screen w/Reflex; Future; Expected date: 11/04/2020  -     Anti Sm/RNP Antibody; Future; Expected date: 11/04/2020  -     Anti-DNA Ab, Double-Stranded; Future; Expected date: 11/04/2020  -     Anti-Histone Antibody; Future; Expected date: 11/04/2020  -     Anti-Smith antibody; Future; Expected date: 11/04/2020  -     C3 Complement; Future; Expected date: 11/04/2020  -     C4 Complement; Future; Expected date: 11/04/2020  -     Sjogrens syndrome-A extractable nuclear antibody; Future; Expected date: 11/04/2020  -     Sjogrens syndrome-B extractable nuclear antibody; Future; Expected date: 11/04/2020    Leukopenia, unspecified type  -     OSMAN Screen w/Reflex; Future; Expected date: 11/04/2020  -      Anti Sm/RNP Antibody; Future; Expected date: 11/04/2020  -     Anti-DNA Ab, Double-Stranded; Future; Expected date: 11/04/2020  -     Anti-Histone Antibody; Future; Expected date: 11/04/2020  -     Anti-Smith antibody; Future; Expected date: 11/04/2020  -     C3 Complement; Future; Expected date: 11/04/2020  -     C4 Complement; Future; Expected date: 11/04/2020  -     Sjogrens syndrome-A extractable nuclear antibody; Future; Expected date: 11/04/2020  -     Sjogrens syndrome-B extractable nuclear antibody; Future; Expected date: 11/04/2020    Other systemic lupus erythematosus with lung involvement  -     OSMAN Screen w/Reflex; Future; Expected date: 11/04/2020  -     Anti Sm/RNP Antibody; Future; Expected date: 11/04/2020  -     Anti-DNA Ab, Double-Stranded; Future; Expected date: 11/04/2020  -     Anti-Histone Antibody; Future; Expected date: 11/04/2020  -     Anti-Smith antibody; Future; Expected date: 11/04/2020  -     C3 Complement; Future; Expected date: 11/04/2020  -     C4 Complement; Future; Expected date: 11/04/2020  -     Sjogrens syndrome-A extractable nuclear antibody; Future; Expected date: 11/04/2020  -     Sjogrens syndrome-B extractable nuclear antibody; Future; Expected date: 11/04/2020          Very pleasant 63-year-old female Sjogren's 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  Increasing joint stiffness and swelling.    She was hesitant to try salagen.   Failed MTX with ASE   Discussed the joints, increasing leucopenia, worsening dry eyes, malaise and fatigue. Consideration for Benlysta would be best next options, another is Arava.    C/i HCQ with maculopathy.    Stable on Mobic 7.5mg BID keep this    Ok for percogesic PRN breakthrough pain. Seems to be working.       Follow up in about 4 months (around 3/4/2021).        F/u 4 months  Thank you for allowing me to participate in the care of this very pleasant patient.    40min consultation with greater than 50% spent in counseling, chart  review and coordination of care. All questions answered.

## 2020-11-22 ENCOUNTER — PATIENT MESSAGE (OUTPATIENT)
Dept: RHEUMATOLOGY | Facility: CLINIC | Age: 65
End: 2020-11-22

## 2021-01-14 DIAGNOSIS — I73.00 RAYNAUD'S DISEASE WITHOUT GANGRENE: Primary | ICD-10-CM

## 2021-01-14 RX ORDER — NIFEDIPINE 30 MG/1
30 TABLET, FILM COATED, EXTENDED RELEASE ORAL DAILY
Qty: 30 TABLET | Refills: 11 | Status: SHIPPED | OUTPATIENT
Start: 2021-01-14 | End: 2021-05-26

## 2021-01-14 RX ORDER — NIFEDIPINE 30 MG/1
30 TABLET, FILM COATED, EXTENDED RELEASE ORAL DAILY
Qty: 30 TABLET | Refills: 11 | Status: SHIPPED | OUTPATIENT
Start: 2021-01-14 | End: 2021-01-14 | Stop reason: SDUPTHER

## 2021-01-14 NOTE — TELEPHONE ENCOUNTER
----- Message from Larisa Casas sent at 1/14/2021  4:41 PM CST -----  Contact: pt  Type: Needs Medical Advice    Who Called:  Patient   called she started having some pain last Friday she has been having some issues with the circulation in her right foot. She has been wearing 3 pairs of socks that does not seem to help keep her  feet warm.     Symptoms (please be specific):  since last Friday her feet are white and she is not able to keep her feet warm.   This is her first time calling about this problem.    [place] and phone #:   [facility] [address] [address] [facility]    Best Call Back Number: [phone]  Additional Information: Patient is asking for a call back please to advise.      Spoke to the patient who has had problems with hands and feet for days. Now just right foot staying white and cold and toenails bluish gray. Hot soaks only help temprorarily. Using massage and extra socks. Having a Sjogren's flare right now. Please advise. GE

## 2021-01-15 NOTE — TELEPHONE ENCOUNTER
For Raynauds try nifedipine 30mg taken daily. If persists please call back.     Not sure what the Sjogrens flare means? Dry eyes and mouth or is she having joint pain.     Thanks Dr. RODRÍGUEZ

## 2021-01-22 ENCOUNTER — TELEPHONE (OUTPATIENT)
Dept: NEUROLOGY | Facility: CLINIC | Age: 66
End: 2021-01-22

## 2021-01-22 ENCOUNTER — PATIENT MESSAGE (OUTPATIENT)
Dept: ADMINISTRATIVE | Facility: OTHER | Age: 66
End: 2021-01-22

## 2021-01-27 ENCOUNTER — OFFICE VISIT (OUTPATIENT)
Dept: NEUROLOGY | Facility: CLINIC | Age: 66
End: 2021-01-27
Payer: MEDICARE

## 2021-01-27 VITALS
DIASTOLIC BLOOD PRESSURE: 84 MMHG | RESPIRATION RATE: 20 BRPM | WEIGHT: 203.13 LBS | SYSTOLIC BLOOD PRESSURE: 166 MMHG | HEART RATE: 66 BPM | BODY MASS INDEX: 31.82 KG/M2 | TEMPERATURE: 98 F

## 2021-01-27 DIAGNOSIS — G45.9 TRANSIENT CEREBRAL ISCHEMIA, UNSPECIFIED TYPE: ICD-10-CM

## 2021-01-27 DIAGNOSIS — I25.10 CORONARY ARTERY DISEASE DUE TO CALCIFIED CORONARY LESION: ICD-10-CM

## 2021-01-27 DIAGNOSIS — R40.4 TRANSIENT ALTERATION OF AWARENESS: ICD-10-CM

## 2021-01-27 DIAGNOSIS — R41.82 ALTERED MENTAL STATUS, UNSPECIFIED ALTERED MENTAL STATUS TYPE: ICD-10-CM

## 2021-01-27 DIAGNOSIS — E53.8 VITAMIN B12 DEFICIENCY: ICD-10-CM

## 2021-01-27 DIAGNOSIS — Z86.73 HISTORY OF CVA (CEREBROVASCULAR ACCIDENT): ICD-10-CM

## 2021-01-27 DIAGNOSIS — E78.00 HYPERCHOLESTEROLEMIA: ICD-10-CM

## 2021-01-27 DIAGNOSIS — I25.84 CORONARY ARTERY DISEASE DUE TO CALCIFIED CORONARY LESION: ICD-10-CM

## 2021-01-27 DIAGNOSIS — M35.00 SJOGREN'S SYNDROME WITHOUT EXTRAGLANDULAR INVOLVEMENT: ICD-10-CM

## 2021-01-27 DIAGNOSIS — I73.00 RAYNAUD'S DISEASE WITHOUT GANGRENE: Primary | ICD-10-CM

## 2021-01-27 PROCEDURE — 3288F PR FALLS RISK ASSESSMENT DOCUMENTED: ICD-10-PCS | Mod: CPTII,S$GLB,, | Performed by: NURSE PRACTITIONER

## 2021-01-27 PROCEDURE — 1159F MED LIST DOCD IN RCRD: CPT | Mod: S$GLB,,, | Performed by: NURSE PRACTITIONER

## 2021-01-27 PROCEDURE — 3288F FALL RISK ASSESSMENT DOCD: CPT | Mod: CPTII,S$GLB,, | Performed by: NURSE PRACTITIONER

## 2021-01-27 PROCEDURE — 1159F PR MEDICATION LIST DOCUMENTED IN MEDICAL RECORD: ICD-10-PCS | Mod: S$GLB,,, | Performed by: NURSE PRACTITIONER

## 2021-01-27 PROCEDURE — 3008F BODY MASS INDEX DOCD: CPT | Mod: CPTII,S$GLB,, | Performed by: NURSE PRACTITIONER

## 2021-01-27 PROCEDURE — 1100F PR PT FALLS ASSESS DOC 2+ FALLS/FALL W/INJURY/YR: ICD-10-PCS | Mod: CPTII,S$GLB,, | Performed by: NURSE PRACTITIONER

## 2021-01-27 PROCEDURE — 1100F PTFALLS ASSESS-DOCD GE2>/YR: CPT | Mod: CPTII,S$GLB,, | Performed by: NURSE PRACTITIONER

## 2021-01-27 PROCEDURE — 99205 OFFICE O/P NEW HI 60 MIN: CPT | Mod: S$GLB,,, | Performed by: NURSE PRACTITIONER

## 2021-01-27 PROCEDURE — 99999 PR PBB SHADOW E&M-EST. PATIENT-LVL V: CPT | Mod: PBBFAC,,, | Performed by: NURSE PRACTITIONER

## 2021-01-27 PROCEDURE — 1126F AMNT PAIN NOTED NONE PRSNT: CPT | Mod: S$GLB,,, | Performed by: NURSE PRACTITIONER

## 2021-01-27 PROCEDURE — 3008F PR BODY MASS INDEX (BMI) DOCUMENTED: ICD-10-PCS | Mod: CPTII,S$GLB,, | Performed by: NURSE PRACTITIONER

## 2021-01-27 PROCEDURE — 99205 PR OFFICE/OUTPT VISIT, NEW, LEVL V, 60-74 MIN: ICD-10-PCS | Mod: S$GLB,,, | Performed by: NURSE PRACTITIONER

## 2021-01-27 PROCEDURE — 1126F PR PAIN SEVERITY QUANTIFIED, NO PAIN PRESENT: ICD-10-PCS | Mod: S$GLB,,, | Performed by: NURSE PRACTITIONER

## 2021-01-27 PROCEDURE — 99999 PR PBB SHADOW E&M-EST. PATIENT-LVL V: ICD-10-PCS | Mod: PBBFAC,,, | Performed by: NURSE PRACTITIONER

## 2021-02-10 ENCOUNTER — HOSPITAL ENCOUNTER (OUTPATIENT)
Dept: RADIOLOGY | Facility: HOSPITAL | Age: 66
Discharge: HOME | End: 2021-02-10
Attending: NURSE PRACTITIONER
Payer: MEDICARE

## 2021-02-10 DIAGNOSIS — R41.82 ALTERED MENTAL STATUS, UNSPECIFIED ALTERED MENTAL STATUS TYPE: ICD-10-CM

## 2021-02-10 DIAGNOSIS — G45.9 TRANSIENT CEREBRAL ISCHEMIA, UNSPECIFIED TYPE: ICD-10-CM

## 2021-02-10 DIAGNOSIS — Z87.821 HISTORY OF FOREIGN BODY IN EYE: ICD-10-CM

## 2021-02-10 PROCEDURE — 25500020 PHARM REV CODE 255: Mod: PO | Performed by: NURSE PRACTITIONER

## 2021-02-10 PROCEDURE — 70250 XR SKULL LTD LESS THAN 4 VIEWS: ICD-10-PCS | Mod: 26,,, | Performed by: RADIOLOGY

## 2021-02-10 PROCEDURE — 70498 CTA HEAD AND NECK (XPD): ICD-10-PCS | Mod: 26,,, | Performed by: RADIOLOGY

## 2021-02-10 PROCEDURE — 70496 CTA HEAD AND NECK (XPD): ICD-10-PCS | Mod: 26,,, | Performed by: RADIOLOGY

## 2021-02-10 PROCEDURE — 70250 X-RAY EXAM OF SKULL: CPT | Mod: 26,,, | Performed by: RADIOLOGY

## 2021-02-10 PROCEDURE — 70496 CT ANGIOGRAPHY HEAD: CPT | Mod: TC,PO

## 2021-02-10 PROCEDURE — 70250 X-RAY EXAM OF SKULL: CPT | Mod: TC,FY,PO

## 2021-02-10 PROCEDURE — 70498 CT ANGIOGRAPHY NECK: CPT | Mod: 26,,, | Performed by: RADIOLOGY

## 2021-02-10 PROCEDURE — 70496 CT ANGIOGRAPHY HEAD: CPT | Mod: 26,,, | Performed by: RADIOLOGY

## 2021-02-10 PROCEDURE — 70498 CT ANGIOGRAPHY NECK: CPT | Mod: TC,PO

## 2021-02-10 RX ADMIN — IOHEXOL 100 ML: 350 INJECTION, SOLUTION INTRAVENOUS at 08:02

## 2021-02-26 ENCOUNTER — TELEPHONE (OUTPATIENT)
Dept: NEUROLOGY | Facility: CLINIC | Age: 66
End: 2021-02-26

## 2021-03-01 ENCOUNTER — OFFICE VISIT (OUTPATIENT)
Dept: NEUROLOGY | Facility: CLINIC | Age: 66
End: 2021-03-01
Payer: MEDICARE

## 2021-03-01 VITALS
HEART RATE: 65 BPM | WEIGHT: 198.19 LBS | RESPIRATION RATE: 16 BRPM | SYSTOLIC BLOOD PRESSURE: 129 MMHG | BODY MASS INDEX: 31.04 KG/M2 | DIASTOLIC BLOOD PRESSURE: 77 MMHG | TEMPERATURE: 97 F

## 2021-03-01 DIAGNOSIS — Z86.73 HISTORY OF CVA (CEREBROVASCULAR ACCIDENT): ICD-10-CM

## 2021-03-01 DIAGNOSIS — R40.4 TRANSIENT ALTERATION OF AWARENESS: ICD-10-CM

## 2021-03-01 PROCEDURE — 3288F FALL RISK ASSESSMENT DOCD: CPT | Mod: CPTII,S$GLB,, | Performed by: NURSE PRACTITIONER

## 2021-03-01 PROCEDURE — 99999 PR PBB SHADOW E&M-EST. PATIENT-LVL IV: CPT | Mod: PBBFAC,,, | Performed by: NURSE PRACTITIONER

## 2021-03-01 PROCEDURE — 1159F PR MEDICATION LIST DOCUMENTED IN MEDICAL RECORD: ICD-10-PCS | Mod: S$GLB,,, | Performed by: NURSE PRACTITIONER

## 2021-03-01 PROCEDURE — 99214 OFFICE O/P EST MOD 30 MIN: CPT | Mod: S$GLB,,, | Performed by: NURSE PRACTITIONER

## 2021-03-01 PROCEDURE — 1159F MED LIST DOCD IN RCRD: CPT | Mod: S$GLB,,, | Performed by: NURSE PRACTITIONER

## 2021-03-01 PROCEDURE — 1101F PR PT FALLS ASSESS DOC 0-1 FALLS W/OUT INJ PAST YR: ICD-10-PCS | Mod: CPTII,S$GLB,, | Performed by: NURSE PRACTITIONER

## 2021-03-01 PROCEDURE — 1101F PT FALLS ASSESS-DOCD LE1/YR: CPT | Mod: CPTII,S$GLB,, | Performed by: NURSE PRACTITIONER

## 2021-03-01 PROCEDURE — 3288F PR FALLS RISK ASSESSMENT DOCUMENTED: ICD-10-PCS | Mod: CPTII,S$GLB,, | Performed by: NURSE PRACTITIONER

## 2021-03-01 PROCEDURE — 1126F AMNT PAIN NOTED NONE PRSNT: CPT | Mod: S$GLB,,, | Performed by: NURSE PRACTITIONER

## 2021-03-01 PROCEDURE — 99214 PR OFFICE/OUTPT VISIT, EST, LEVL IV, 30-39 MIN: ICD-10-PCS | Mod: S$GLB,,, | Performed by: NURSE PRACTITIONER

## 2021-03-01 PROCEDURE — 1126F PR PAIN SEVERITY QUANTIFIED, NO PAIN PRESENT: ICD-10-PCS | Mod: S$GLB,,, | Performed by: NURSE PRACTITIONER

## 2021-03-01 PROCEDURE — 99999 PR PBB SHADOW E&M-EST. PATIENT-LVL IV: ICD-10-PCS | Mod: PBBFAC,,, | Performed by: NURSE PRACTITIONER

## 2021-03-01 PROCEDURE — 3008F PR BODY MASS INDEX (BMI) DOCUMENTED: ICD-10-PCS | Mod: CPTII,S$GLB,, | Performed by: NURSE PRACTITIONER

## 2021-03-01 PROCEDURE — 3008F BODY MASS INDEX DOCD: CPT | Mod: CPTII,S$GLB,, | Performed by: NURSE PRACTITIONER

## 2021-04-16 ENCOUNTER — OFFICE VISIT (OUTPATIENT)
Dept: RHEUMATOLOGY | Facility: CLINIC | Age: 66
End: 2021-04-16
Payer: MEDICARE

## 2021-04-16 VITALS
HEIGHT: 67 IN | DIASTOLIC BLOOD PRESSURE: 82 MMHG | WEIGHT: 192.69 LBS | HEART RATE: 90 BPM | SYSTOLIC BLOOD PRESSURE: 132 MMHG | BODY MASS INDEX: 30.24 KG/M2

## 2021-04-16 DIAGNOSIS — R53.81 MALAISE AND FATIGUE: ICD-10-CM

## 2021-04-16 DIAGNOSIS — R53.83 MALAISE AND FATIGUE: ICD-10-CM

## 2021-04-16 DIAGNOSIS — M32.13 OTHER SYSTEMIC LUPUS ERYTHEMATOSUS WITH LUNG INVOLVEMENT: Primary | ICD-10-CM

## 2021-04-16 DIAGNOSIS — M35.00 SJOGREN'S SYNDROME WITHOUT EXTRAGLANDULAR INVOLVEMENT: ICD-10-CM

## 2021-04-16 DIAGNOSIS — I73.00 RAYNAUD'S DISEASE WITHOUT GANGRENE: ICD-10-CM

## 2021-04-16 PROCEDURE — 1125F AMNT PAIN NOTED PAIN PRSNT: CPT | Mod: S$GLB,,, | Performed by: INTERNAL MEDICINE

## 2021-04-16 PROCEDURE — 99215 PR OFFICE/OUTPT VISIT, EST, LEVL V, 40-54 MIN: ICD-10-PCS | Mod: S$GLB,,, | Performed by: INTERNAL MEDICINE

## 2021-04-16 PROCEDURE — 1100F PTFALLS ASSESS-DOCD GE2>/YR: CPT | Mod: CPTII,S$GLB,, | Performed by: INTERNAL MEDICINE

## 2021-04-16 PROCEDURE — 1159F MED LIST DOCD IN RCRD: CPT | Mod: S$GLB,,, | Performed by: INTERNAL MEDICINE

## 2021-04-16 PROCEDURE — 1100F PR PT FALLS ASSESS DOC 2+ FALLS/FALL W/INJURY/YR: ICD-10-PCS | Mod: CPTII,S$GLB,, | Performed by: INTERNAL MEDICINE

## 2021-04-16 PROCEDURE — 3288F FALL RISK ASSESSMENT DOCD: CPT | Mod: CPTII,S$GLB,, | Performed by: INTERNAL MEDICINE

## 2021-04-16 PROCEDURE — 99215 OFFICE O/P EST HI 40 MIN: CPT | Mod: S$GLB,,, | Performed by: INTERNAL MEDICINE

## 2021-04-16 PROCEDURE — 3288F PR FALLS RISK ASSESSMENT DOCUMENTED: ICD-10-PCS | Mod: CPTII,S$GLB,, | Performed by: INTERNAL MEDICINE

## 2021-04-16 PROCEDURE — 99999 PR PBB SHADOW E&M-EST. PATIENT-LVL IV: ICD-10-PCS | Mod: PBBFAC,,, | Performed by: INTERNAL MEDICINE

## 2021-04-16 PROCEDURE — 1125F PR PAIN SEVERITY QUANTIFIED, PAIN PRESENT: ICD-10-PCS | Mod: S$GLB,,, | Performed by: INTERNAL MEDICINE

## 2021-04-16 PROCEDURE — 1159F PR MEDICATION LIST DOCUMENTED IN MEDICAL RECORD: ICD-10-PCS | Mod: S$GLB,,, | Performed by: INTERNAL MEDICINE

## 2021-04-16 PROCEDURE — 3008F BODY MASS INDEX DOCD: CPT | Mod: CPTII,S$GLB,, | Performed by: INTERNAL MEDICINE

## 2021-04-16 PROCEDURE — 99999 PR PBB SHADOW E&M-EST. PATIENT-LVL IV: CPT | Mod: PBBFAC,,, | Performed by: INTERNAL MEDICINE

## 2021-04-16 PROCEDURE — 3008F PR BODY MASS INDEX (BMI) DOCUMENTED: ICD-10-PCS | Mod: CPTII,S$GLB,, | Performed by: INTERNAL MEDICINE

## 2021-04-16 RX ORDER — LANOLIN ALCOHOL/MO/W.PET/CERES
400 CREAM (GRAM) TOPICAL NIGHTLY
COMMUNITY

## 2021-04-16 RX ORDER — BELIMUMAB 200 MG/ML
200 SOLUTION SUBCUTANEOUS WEEKLY
Qty: 4 ML | Refills: 11 | OUTPATIENT
Start: 2021-04-16 | End: 2021-05-26

## 2021-04-16 RX ORDER — RIBOFLAVIN (VITAMIN B2) 400 MG
400 TABLET ORAL DAILY
COMMUNITY
End: 2021-09-29

## 2021-04-20 ENCOUNTER — SPECIALTY PHARMACY (OUTPATIENT)
Dept: PHARMACY | Facility: CLINIC | Age: 66
End: 2021-04-20

## 2021-04-21 ENCOUNTER — TELEPHONE (OUTPATIENT)
Dept: OPHTHALMOLOGY | Facility: CLINIC | Age: 66
End: 2021-04-21

## 2021-04-21 ENCOUNTER — TELEPHONE (OUTPATIENT)
Dept: RHEUMATOLOGY | Facility: CLINIC | Age: 66
End: 2021-04-21

## 2021-05-19 ENCOUNTER — TELEPHONE (OUTPATIENT)
Dept: RHEUMATOLOGY | Facility: CLINIC | Age: 66
End: 2021-05-19

## 2021-06-03 ENCOUNTER — TELEPHONE (OUTPATIENT)
Dept: NEUROLOGY | Facility: CLINIC | Age: 66
End: 2021-06-03

## 2021-06-28 ENCOUNTER — OFFICE VISIT (OUTPATIENT)
Dept: NEUROLOGY | Facility: CLINIC | Age: 66
End: 2021-06-28
Payer: MEDICARE

## 2021-06-28 ENCOUNTER — TELEPHONE (OUTPATIENT)
Dept: NEUROLOGY | Facility: CLINIC | Age: 66
End: 2021-06-28

## 2021-06-28 ENCOUNTER — PATIENT MESSAGE (OUTPATIENT)
Dept: NEUROLOGY | Facility: CLINIC | Age: 66
End: 2021-06-28

## 2021-06-28 VITALS
RESPIRATION RATE: 18 BRPM | HEIGHT: 67 IN | BODY MASS INDEX: 31.28 KG/M2 | HEART RATE: 65 BPM | SYSTOLIC BLOOD PRESSURE: 138 MMHG | WEIGHT: 199.31 LBS | DIASTOLIC BLOOD PRESSURE: 82 MMHG

## 2021-06-28 DIAGNOSIS — G45.9 TIA (TRANSIENT ISCHEMIC ATTACK): Primary | ICD-10-CM

## 2021-06-28 DIAGNOSIS — G43.509 PERSISTENT MIGRAINE AURA WITHOUT CEREBRAL INFARCTION AND WITHOUT STATUS MIGRAINOSUS, NOT INTRACTABLE: ICD-10-CM

## 2021-06-28 DIAGNOSIS — M54.2 NECK PAIN: ICD-10-CM

## 2021-06-28 DIAGNOSIS — R20.2 PARESTHESIAS: ICD-10-CM

## 2021-06-28 DIAGNOSIS — G45.0 VERTEBROBASILAR TIAS: ICD-10-CM

## 2021-06-28 DIAGNOSIS — R04.0 EPISTAXIS: ICD-10-CM

## 2021-06-28 DIAGNOSIS — M54.12 RADICULOPATHY, CERVICAL REGION: ICD-10-CM

## 2021-06-28 PROCEDURE — 99999 PR PBB SHADOW E&M-EST. PATIENT-LVL V: CPT | Mod: PBBFAC,,, | Performed by: NURSE PRACTITIONER

## 2021-06-28 PROCEDURE — 1101F PT FALLS ASSESS-DOCD LE1/YR: CPT | Mod: CPTII,S$GLB,, | Performed by: NURSE PRACTITIONER

## 2021-06-28 PROCEDURE — 3288F FALL RISK ASSESSMENT DOCD: CPT | Mod: CPTII,S$GLB,, | Performed by: NURSE PRACTITIONER

## 2021-06-28 PROCEDURE — 3288F PR FALLS RISK ASSESSMENT DOCUMENTED: ICD-10-PCS | Mod: CPTII,S$GLB,, | Performed by: NURSE PRACTITIONER

## 2021-06-28 PROCEDURE — 3008F BODY MASS INDEX DOCD: CPT | Mod: CPTII,S$GLB,, | Performed by: NURSE PRACTITIONER

## 2021-06-28 PROCEDURE — 99215 PR OFFICE/OUTPT VISIT, EST, LEVL V, 40-54 MIN: ICD-10-PCS | Mod: S$GLB,,, | Performed by: NURSE PRACTITIONER

## 2021-06-28 PROCEDURE — 3008F PR BODY MASS INDEX (BMI) DOCUMENTED: ICD-10-PCS | Mod: CPTII,S$GLB,, | Performed by: NURSE PRACTITIONER

## 2021-06-28 PROCEDURE — 1126F AMNT PAIN NOTED NONE PRSNT: CPT | Mod: S$GLB,,, | Performed by: NURSE PRACTITIONER

## 2021-06-28 PROCEDURE — 99215 OFFICE O/P EST HI 40 MIN: CPT | Mod: S$GLB,,, | Performed by: NURSE PRACTITIONER

## 2021-06-28 PROCEDURE — 1159F PR MEDICATION LIST DOCUMENTED IN MEDICAL RECORD: ICD-10-PCS | Mod: S$GLB,,, | Performed by: NURSE PRACTITIONER

## 2021-06-28 PROCEDURE — 99417 PR PROLONGED SVC, OUTPT, W/WO DIRECT PT CONTACT,  EA ADDTL 15 MIN: ICD-10-PCS | Mod: S$GLB,,, | Performed by: NURSE PRACTITIONER

## 2021-06-28 PROCEDURE — 1126F PR PAIN SEVERITY QUANTIFIED, NO PAIN PRESENT: ICD-10-PCS | Mod: S$GLB,,, | Performed by: NURSE PRACTITIONER

## 2021-06-28 PROCEDURE — 1159F MED LIST DOCD IN RCRD: CPT | Mod: S$GLB,,, | Performed by: NURSE PRACTITIONER

## 2021-06-28 PROCEDURE — 1101F PR PT FALLS ASSESS DOC 0-1 FALLS W/OUT INJ PAST YR: ICD-10-PCS | Mod: CPTII,S$GLB,, | Performed by: NURSE PRACTITIONER

## 2021-06-28 PROCEDURE — 99417 PROLNG OP E/M EACH 15 MIN: CPT | Mod: S$GLB,,, | Performed by: NURSE PRACTITIONER

## 2021-06-28 PROCEDURE — 99999 PR PBB SHADOW E&M-EST. PATIENT-LVL V: ICD-10-PCS | Mod: PBBFAC,,, | Performed by: NURSE PRACTITIONER

## 2021-06-28 RX ORDER — ROSUVASTATIN CALCIUM 20 MG/1
20 TABLET, COATED ORAL DAILY
Qty: 90 TABLET | Refills: 3 | Status: SHIPPED | OUTPATIENT
Start: 2021-06-28 | End: 2021-06-28 | Stop reason: SDUPTHER

## 2021-06-28 RX ORDER — ROSUVASTATIN CALCIUM 20 MG/1
20 TABLET, COATED ORAL DAILY
Qty: 90 TABLET | Refills: 3 | Status: SHIPPED | OUTPATIENT
Start: 2021-06-28 | End: 2022-02-01 | Stop reason: SDUPTHER

## 2021-06-28 RX ORDER — CLOPIDOGREL BISULFATE 75 MG/1
75 TABLET ORAL DAILY
Qty: 30 TABLET | Refills: 11 | Status: SHIPPED | OUTPATIENT
Start: 2021-06-28 | End: 2021-06-28 | Stop reason: SDUPTHER

## 2021-06-28 RX ORDER — CLOPIDOGREL BISULFATE 75 MG/1
75 TABLET ORAL DAILY
Qty: 90 TABLET | Refills: 3 | Status: SHIPPED | OUTPATIENT
Start: 2021-06-28 | End: 2022-02-01 | Stop reason: SDUPTHER

## 2021-07-02 ENCOUNTER — PROCEDURE VISIT (OUTPATIENT)
Dept: NEUROLOGY | Facility: CLINIC | Age: 66
End: 2021-07-02
Payer: MEDICARE

## 2021-07-02 DIAGNOSIS — M54.12 CERVICAL RADICULOPATHY: Primary | ICD-10-CM

## 2021-07-02 DIAGNOSIS — R20.2 PARESTHESIAS: ICD-10-CM

## 2021-07-02 PROCEDURE — 95886 PR EMG COMPLETE, W/ NERVE CONDUCTION STUDIES, 5+ MUSCLES: ICD-10-PCS | Mod: S$GLB,,, | Performed by: PSYCHIATRY & NEUROLOGY

## 2021-07-02 PROCEDURE — 95886 MUSC TEST DONE W/N TEST COMP: CPT | Mod: S$GLB,,, | Performed by: PSYCHIATRY & NEUROLOGY

## 2021-07-02 PROCEDURE — 95908 NRV CNDJ TST 3-4 STUDIES: CPT | Mod: S$GLB,,, | Performed by: PSYCHIATRY & NEUROLOGY

## 2021-07-02 PROCEDURE — 95908 PR NERVE CONDUCTION STUDY; 3-4 STUDIES: ICD-10-PCS | Mod: S$GLB,,, | Performed by: PSYCHIATRY & NEUROLOGY

## 2021-07-07 ENCOUNTER — TELEPHONE (OUTPATIENT)
Dept: NEUROLOGY | Facility: CLINIC | Age: 66
End: 2021-07-07

## 2021-07-12 ENCOUNTER — PATIENT MESSAGE (OUTPATIENT)
Dept: NEUROLOGY | Facility: CLINIC | Age: 66
End: 2021-07-12

## 2021-07-15 ENCOUNTER — HOSPITAL ENCOUNTER (OUTPATIENT)
Dept: RADIOLOGY | Facility: HOSPITAL | Age: 66
Discharge: HOME | End: 2021-07-15
Attending: NURSE PRACTITIONER
Payer: MEDICARE

## 2021-07-15 DIAGNOSIS — M54.12 RADICULOPATHY, CERVICAL REGION: ICD-10-CM

## 2021-07-15 PROCEDURE — 72141 MRI NECK SPINE W/O DYE: CPT | Mod: 26,,, | Performed by: RADIOLOGY

## 2021-07-15 PROCEDURE — 72141 MRI NECK SPINE W/O DYE: CPT | Mod: TC,PO

## 2021-07-15 PROCEDURE — 72141 MRI CERVICAL SPINE WITHOUT CONTRAST: ICD-10-PCS | Mod: 26,,, | Performed by: RADIOLOGY

## 2021-07-16 ENCOUNTER — PATIENT MESSAGE (OUTPATIENT)
Dept: NEUROLOGY | Facility: CLINIC | Age: 66
End: 2021-07-16

## 2021-07-28 ENCOUNTER — TELEPHONE (OUTPATIENT)
Dept: NEUROLOGY | Facility: CLINIC | Age: 66
End: 2021-07-28

## 2021-07-28 ENCOUNTER — CLINICAL SUPPORT (OUTPATIENT)
Dept: CARDIOLOGY | Facility: HOSPITAL | Age: 66
End: 2021-07-28
Attending: NURSE PRACTITIONER
Payer: MEDICARE

## 2021-07-28 VITALS — BODY MASS INDEX: 31.23 KG/M2 | HEIGHT: 67 IN | WEIGHT: 199 LBS

## 2021-07-28 DIAGNOSIS — G45.9 TIA (TRANSIENT ISCHEMIC ATTACK): ICD-10-CM

## 2021-07-28 PROCEDURE — 93306 ECHO (CUPID ONLY): ICD-10-PCS | Mod: 26,,, | Performed by: INTERNAL MEDICINE

## 2021-07-28 PROCEDURE — 93306 TTE W/DOPPLER COMPLETE: CPT | Mod: 26,,, | Performed by: INTERNAL MEDICINE

## 2021-07-28 PROCEDURE — 93306 TTE W/DOPPLER COMPLETE: CPT | Mod: PO

## 2021-07-29 LAB
AV INDEX (PROSTH): 1.06
AV MEAN GRADIENT: 3 MMHG
AV PEAK GRADIENT: 6 MMHG
AV VALVE AREA: 3.22 CM2
AV VELOCITY RATIO: 1.02
BSA FOR ECHO PROCEDURE: 2.07 M2
CV ECHO LV RWT: 0.39 CM
DOP CALC AO PEAK VEL: 1.21 M/S
DOP CALC AO VTI: 23.36 CM
DOP CALC LVOT AREA: 3 CM2
DOP CALC LVOT DIAMETER: 1.97 CM
DOP CALC LVOT PEAK VEL: 1.23 M/S
DOP CALC LVOT STROKE VOLUME: 75.13 CM3
DOP CALCLVOT PEAK VEL VTI: 24.66 CM
E WAVE DECELERATION TIME: 221.33 MSEC
E/A RATIO: 0.8
E/E' RATIO: 7.4 M/S
ECHO LV POSTERIOR WALL: 0.95 CM (ref 0.6–1.1)
EJECTION FRACTION: 55 %
FRACTIONAL SHORTENING: 37 % (ref 28–44)
INTERVENTRICULAR SEPTUM: 0.92 CM (ref 0.6–1.1)
LA MAJOR: 4.9 CM
LA MINOR: 5.46 CM
LA WIDTH: 3.7 CM
LEFT ATRIUM SIZE: 3.98 CM
LEFT ATRIUM VOLUME INDEX: 32 ML/M2
LEFT ATRIUM VOLUME: 64.65 CM3
LEFT INTERNAL DIMENSION IN SYSTOLE: 3.07 CM (ref 2.1–4)
LEFT VENTRICLE DIASTOLIC VOLUME INDEX: 54.87 ML/M2
LEFT VENTRICLE DIASTOLIC VOLUME: 110.83 ML
LEFT VENTRICLE MASS INDEX: 79 G/M2
LEFT VENTRICLE SYSTOLIC VOLUME INDEX: 18.4 ML/M2
LEFT VENTRICLE SYSTOLIC VOLUME: 37.12 ML
LEFT VENTRICULAR INTERNAL DIMENSION IN DIASTOLE: 4.86 CM (ref 3.5–6)
LEFT VENTRICULAR MASS: 158.7 G
LV LATERAL E/E' RATIO: 7.4 M/S
LV SEPTAL E/E' RATIO: 7.4 M/S
MV A" WAVE DURATION": 9.9 MSEC
MV PEAK A VEL: 0.93 M/S
MV PEAK E VEL: 0.74 M/S
PISA TR MAX VEL: 2.82 M/S
PULM VEIN S/D RATIO: 1.56
PV PEAK D VEL: 0.5 M/S
PV PEAK S VEL: 0.78 M/S
RA MAJOR: 4.27 CM
RA PRESSURE: 3 MMHG
RA WIDTH: 3.55 CM
RIGHT VENTRICULAR END-DIASTOLIC DIMENSION: 3.64 CM
RV TISSUE DOPPLER FREE WALL SYSTOLIC VELOCITY 1 (APICAL 4 CHAMBER VIEW): 12.82 CM/S
SINUS: 3.12 CM
STJ: 2.74 CM
TDI LATERAL: 0.1 M/S
TDI SEPTAL: 0.1 M/S
TDI: 0.1 M/S
TR MAX PG: 32 MMHG
TRICUSPID ANNULAR PLANE SYSTOLIC EXCURSION: 2.59 CM
TV REST PULMONARY ARTERY PRESSURE: 35 MMHG

## 2021-08-09 ENCOUNTER — TELEPHONE (OUTPATIENT)
Dept: RHEUMATOLOGY | Facility: CLINIC | Age: 66
End: 2021-08-09

## 2021-08-09 ENCOUNTER — PATIENT MESSAGE (OUTPATIENT)
Dept: RHEUMATOLOGY | Facility: CLINIC | Age: 66
End: 2021-08-09

## 2021-08-25 ENCOUNTER — TELEPHONE (OUTPATIENT)
Dept: GASTROENTEROLOGY | Facility: CLINIC | Age: 66
End: 2021-08-25

## 2021-08-25 ENCOUNTER — OFFICE VISIT (OUTPATIENT)
Dept: GASTROENTEROLOGY | Facility: CLINIC | Age: 66
End: 2021-08-25
Payer: MEDICARE

## 2021-08-25 VITALS — WEIGHT: 209.88 LBS | HEIGHT: 67 IN | BODY MASS INDEX: 32.94 KG/M2

## 2021-08-25 DIAGNOSIS — R14.2 BELCHING: ICD-10-CM

## 2021-08-25 DIAGNOSIS — Z87.19 HISTORY OF PANCREATITIS: ICD-10-CM

## 2021-08-25 DIAGNOSIS — Z90.49 S/P CHOLECYSTECTOMY: ICD-10-CM

## 2021-08-25 DIAGNOSIS — Z01.818 PRE-OP TESTING: ICD-10-CM

## 2021-08-25 DIAGNOSIS — R10.13 EPIGASTRIC PAIN: Primary | ICD-10-CM

## 2021-08-25 DIAGNOSIS — Z86.010 HISTORY OF COLON POLYPS: ICD-10-CM

## 2021-08-25 DIAGNOSIS — R14.0 ABDOMINAL BLOATING: ICD-10-CM

## 2021-08-25 DIAGNOSIS — K59.09 CHRONIC CONSTIPATION: ICD-10-CM

## 2021-08-25 DIAGNOSIS — L29.0 ANAL ITCHING: ICD-10-CM

## 2021-08-25 DIAGNOSIS — Z87.19 HISTORY OF GASTROESOPHAGEAL REFLUX (GERD): ICD-10-CM

## 2021-08-25 DIAGNOSIS — Z79.01 CURRENT USE OF ANTICOAGULANT THERAPY: ICD-10-CM

## 2021-08-25 PROCEDURE — 3288F PR FALLS RISK ASSESSMENT DOCUMENTED: ICD-10-PCS | Mod: CPTII,S$GLB,, | Performed by: NURSE PRACTITIONER

## 2021-08-25 PROCEDURE — 1160F PR REVIEW ALL MEDS BY PRESCRIBER/CLIN PHARMACIST DOCUMENTED: ICD-10-PCS | Mod: CPTII,S$GLB,, | Performed by: NURSE PRACTITIONER

## 2021-08-25 PROCEDURE — 1101F PR PT FALLS ASSESS DOC 0-1 FALLS W/OUT INJ PAST YR: ICD-10-PCS | Mod: CPTII,S$GLB,, | Performed by: NURSE PRACTITIONER

## 2021-08-25 PROCEDURE — 3008F BODY MASS INDEX DOCD: CPT | Mod: CPTII,S$GLB,, | Performed by: NURSE PRACTITIONER

## 2021-08-25 PROCEDURE — 1160F RVW MEDS BY RX/DR IN RCRD: CPT | Mod: CPTII,S$GLB,, | Performed by: NURSE PRACTITIONER

## 2021-08-25 PROCEDURE — 1159F MED LIST DOCD IN RCRD: CPT | Mod: CPTII,S$GLB,, | Performed by: NURSE PRACTITIONER

## 2021-08-25 PROCEDURE — 1159F PR MEDICATION LIST DOCUMENTED IN MEDICAL RECORD: ICD-10-PCS | Mod: CPTII,S$GLB,, | Performed by: NURSE PRACTITIONER

## 2021-08-25 PROCEDURE — 99999 PR PBB SHADOW E&M-EST. PATIENT-LVL IV: ICD-10-PCS | Mod: PBBFAC,,, | Performed by: NURSE PRACTITIONER

## 2021-08-25 PROCEDURE — 1101F PT FALLS ASSESS-DOCD LE1/YR: CPT | Mod: CPTII,S$GLB,, | Performed by: NURSE PRACTITIONER

## 2021-08-25 PROCEDURE — 3288F FALL RISK ASSESSMENT DOCD: CPT | Mod: CPTII,S$GLB,, | Performed by: NURSE PRACTITIONER

## 2021-08-25 PROCEDURE — 99999 PR PBB SHADOW E&M-EST. PATIENT-LVL IV: CPT | Mod: PBBFAC,,, | Performed by: NURSE PRACTITIONER

## 2021-08-25 PROCEDURE — 99214 PR OFFICE/OUTPT VISIT, EST, LEVL IV, 30-39 MIN: ICD-10-PCS | Mod: S$GLB,,, | Performed by: NURSE PRACTITIONER

## 2021-08-25 PROCEDURE — 99214 OFFICE O/P EST MOD 30 MIN: CPT | Mod: S$GLB,,, | Performed by: NURSE PRACTITIONER

## 2021-08-25 PROCEDURE — 3008F PR BODY MASS INDEX (BMI) DOCUMENTED: ICD-10-PCS | Mod: CPTII,S$GLB,, | Performed by: NURSE PRACTITIONER

## 2021-08-25 RX ORDER — HYDROCORTISONE 25 MG/G
CREAM TOPICAL 2 TIMES DAILY
Qty: 1 TUBE | Refills: 1 | Status: SHIPPED | OUTPATIENT
Start: 2021-08-25 | End: 2022-08-08 | Stop reason: SDUPTHER

## 2021-08-25 RX ORDER — FLUOROMETHOLONE 1 MG/ML
SUSPENSION/ DROPS OPHTHALMIC
COMMUNITY
Start: 2021-07-03 | End: 2023-07-31 | Stop reason: SDUPTHER

## 2021-08-26 ENCOUNTER — PATIENT MESSAGE (OUTPATIENT)
Dept: GASTROENTEROLOGY | Facility: CLINIC | Age: 66
End: 2021-08-26

## 2021-08-26 ENCOUNTER — PATIENT MESSAGE (OUTPATIENT)
Dept: NEUROLOGY | Facility: CLINIC | Age: 66
End: 2021-08-26

## 2021-09-01 ENCOUNTER — TELEPHONE (OUTPATIENT)
Dept: GASTROENTEROLOGY | Facility: CLINIC | Age: 66
End: 2021-09-01

## 2021-09-20 ENCOUNTER — TELEPHONE (OUTPATIENT)
Dept: RHEUMATOLOGY | Facility: CLINIC | Age: 66
End: 2021-09-20

## 2021-09-20 ENCOUNTER — PATIENT MESSAGE (OUTPATIENT)
Dept: RHEUMATOLOGY | Facility: CLINIC | Age: 66
End: 2021-09-20

## 2021-09-23 ENCOUNTER — TELEPHONE (OUTPATIENT)
Dept: GASTROENTEROLOGY | Facility: CLINIC | Age: 66
End: 2021-09-23

## 2021-09-23 ENCOUNTER — OFFICE VISIT (OUTPATIENT)
Dept: RHEUMATOLOGY | Facility: CLINIC | Age: 66
End: 2021-09-23
Payer: MEDICARE

## 2021-09-23 VITALS
BODY MASS INDEX: 33.41 KG/M2 | SYSTOLIC BLOOD PRESSURE: 136 MMHG | HEIGHT: 67 IN | HEART RATE: 66 BPM | WEIGHT: 212.88 LBS | DIASTOLIC BLOOD PRESSURE: 79 MMHG

## 2021-09-23 DIAGNOSIS — M35.00 SJOGREN'S SYNDROME WITHOUT EXTRAGLANDULAR INVOLVEMENT: Primary | ICD-10-CM

## 2021-09-23 DIAGNOSIS — I73.00 RAYNAUD'S DISEASE WITHOUT GANGRENE: ICD-10-CM

## 2021-09-23 PROCEDURE — 99215 OFFICE O/P EST HI 40 MIN: CPT | Mod: S$GLB,,, | Performed by: INTERNAL MEDICINE

## 2021-09-23 PROCEDURE — 99215 PR OFFICE/OUTPT VISIT, EST, LEVL V, 40-54 MIN: ICD-10-PCS | Mod: S$GLB,,, | Performed by: INTERNAL MEDICINE

## 2021-09-23 PROCEDURE — 3075F SYST BP GE 130 - 139MM HG: CPT | Mod: CPTII,S$GLB,, | Performed by: INTERNAL MEDICINE

## 2021-09-23 PROCEDURE — 3078F DIAST BP <80 MM HG: CPT | Mod: CPTII,S$GLB,, | Performed by: INTERNAL MEDICINE

## 2021-09-23 PROCEDURE — 1159F MED LIST DOCD IN RCRD: CPT | Mod: CPTII,S$GLB,, | Performed by: INTERNAL MEDICINE

## 2021-09-23 PROCEDURE — 3075F PR MOST RECENT SYSTOLIC BLOOD PRESS GE 130-139MM HG: ICD-10-PCS | Mod: CPTII,S$GLB,, | Performed by: INTERNAL MEDICINE

## 2021-09-23 PROCEDURE — 3008F PR BODY MASS INDEX (BMI) DOCUMENTED: ICD-10-PCS | Mod: CPTII,S$GLB,, | Performed by: INTERNAL MEDICINE

## 2021-09-23 PROCEDURE — 99999 PR PBB SHADOW E&M-EST. PATIENT-LVL IV: CPT | Mod: PBBFAC,,, | Performed by: INTERNAL MEDICINE

## 2021-09-23 PROCEDURE — 3078F PR MOST RECENT DIASTOLIC BLOOD PRESSURE < 80 MM HG: ICD-10-PCS | Mod: CPTII,S$GLB,, | Performed by: INTERNAL MEDICINE

## 2021-09-23 PROCEDURE — 1126F AMNT PAIN NOTED NONE PRSNT: CPT | Mod: CPTII,S$GLB,, | Performed by: INTERNAL MEDICINE

## 2021-09-23 PROCEDURE — 1126F PR PAIN SEVERITY QUANTIFIED, NO PAIN PRESENT: ICD-10-PCS | Mod: CPTII,S$GLB,, | Performed by: INTERNAL MEDICINE

## 2021-09-23 PROCEDURE — 1159F PR MEDICATION LIST DOCUMENTED IN MEDICAL RECORD: ICD-10-PCS | Mod: CPTII,S$GLB,, | Performed by: INTERNAL MEDICINE

## 2021-09-23 PROCEDURE — 99999 PR PBB SHADOW E&M-EST. PATIENT-LVL IV: ICD-10-PCS | Mod: PBBFAC,,, | Performed by: INTERNAL MEDICINE

## 2021-09-23 PROCEDURE — 1160F PR REVIEW ALL MEDS BY PRESCRIBER/CLIN PHARMACIST DOCUMENTED: ICD-10-PCS | Mod: CPTII,S$GLB,, | Performed by: INTERNAL MEDICINE

## 2021-09-23 PROCEDURE — 1160F RVW MEDS BY RX/DR IN RCRD: CPT | Mod: CPTII,S$GLB,, | Performed by: INTERNAL MEDICINE

## 2021-09-23 PROCEDURE — 3008F BODY MASS INDEX DOCD: CPT | Mod: CPTII,S$GLB,, | Performed by: INTERNAL MEDICINE

## 2021-09-23 ASSESSMENT — ROUTINE ASSESSMENT OF PATIENT INDEX DATA (RAPID3)
MDHAQ FUNCTION SCORE: 1.1
PATIENT GLOBAL ASSESSMENT SCORE: 2
TOTAL RAPID3 SCORE: 2.55
FATIGUE SCORE: 1.1
PSYCHOLOGICAL DISTRESS SCORE: 2.2
PAIN SCORE: 2

## 2021-09-27 ENCOUNTER — OFFICE VISIT (OUTPATIENT)
Dept: NEUROLOGY | Facility: CLINIC | Age: 66
End: 2021-09-27
Payer: MEDICARE

## 2021-09-27 ENCOUNTER — TELEPHONE (OUTPATIENT)
Dept: NEUROLOGY | Facility: CLINIC | Age: 66
End: 2021-09-27

## 2021-09-27 VITALS
SYSTOLIC BLOOD PRESSURE: 122 MMHG | WEIGHT: 214.5 LBS | HEIGHT: 67 IN | TEMPERATURE: 98 F | DIASTOLIC BLOOD PRESSURE: 56 MMHG | HEART RATE: 76 BPM | BODY MASS INDEX: 33.67 KG/M2

## 2021-09-27 DIAGNOSIS — M54.81 OCCIPITAL NEURALGIA OF RIGHT SIDE: ICD-10-CM

## 2021-09-27 DIAGNOSIS — M54.12 CERVICAL RADICULOPATHY: ICD-10-CM

## 2021-09-27 DIAGNOSIS — R26.89 IMBALANCE: Primary | ICD-10-CM

## 2021-09-27 PROCEDURE — 1159F PR MEDICATION LIST DOCUMENTED IN MEDICAL RECORD: ICD-10-PCS | Mod: CPTII,S$GLB,, | Performed by: PSYCHIATRY & NEUROLOGY

## 2021-09-27 PROCEDURE — 3288F PR FALLS RISK ASSESSMENT DOCUMENTED: ICD-10-PCS | Mod: CPTII,S$GLB,, | Performed by: PSYCHIATRY & NEUROLOGY

## 2021-09-27 PROCEDURE — 3078F PR MOST RECENT DIASTOLIC BLOOD PRESSURE < 80 MM HG: ICD-10-PCS | Mod: CPTII,S$GLB,, | Performed by: PSYCHIATRY & NEUROLOGY

## 2021-09-27 PROCEDURE — 3008F BODY MASS INDEX DOCD: CPT | Mod: CPTII,S$GLB,, | Performed by: PSYCHIATRY & NEUROLOGY

## 2021-09-27 PROCEDURE — 1100F PR PT FALLS ASSESS DOC 2+ FALLS/FALL W/INJURY/YR: ICD-10-PCS | Mod: CPTII,S$GLB,, | Performed by: PSYCHIATRY & NEUROLOGY

## 2021-09-27 PROCEDURE — 99999 PR PBB SHADOW E&M-EST. PATIENT-LVL V: ICD-10-PCS | Mod: PBBFAC,,, | Performed by: PSYCHIATRY & NEUROLOGY

## 2021-09-27 PROCEDURE — 1160F RVW MEDS BY RX/DR IN RCRD: CPT | Mod: CPTII,S$GLB,, | Performed by: PSYCHIATRY & NEUROLOGY

## 2021-09-27 PROCEDURE — 3074F PR MOST RECENT SYSTOLIC BLOOD PRESSURE < 130 MM HG: ICD-10-PCS | Mod: CPTII,S$GLB,, | Performed by: PSYCHIATRY & NEUROLOGY

## 2021-09-27 PROCEDURE — 3008F PR BODY MASS INDEX (BMI) DOCUMENTED: ICD-10-PCS | Mod: CPTII,S$GLB,, | Performed by: PSYCHIATRY & NEUROLOGY

## 2021-09-27 PROCEDURE — 99215 PR OFFICE/OUTPT VISIT, EST, LEVL V, 40-54 MIN: ICD-10-PCS | Mod: S$GLB,,, | Performed by: PSYCHIATRY & NEUROLOGY

## 2021-09-27 PROCEDURE — 3288F FALL RISK ASSESSMENT DOCD: CPT | Mod: CPTII,S$GLB,, | Performed by: PSYCHIATRY & NEUROLOGY

## 2021-09-27 PROCEDURE — 3078F DIAST BP <80 MM HG: CPT | Mod: CPTII,S$GLB,, | Performed by: PSYCHIATRY & NEUROLOGY

## 2021-09-27 PROCEDURE — 1126F AMNT PAIN NOTED NONE PRSNT: CPT | Mod: CPTII,S$GLB,, | Performed by: PSYCHIATRY & NEUROLOGY

## 2021-09-27 PROCEDURE — 1160F PR REVIEW ALL MEDS BY PRESCRIBER/CLIN PHARMACIST DOCUMENTED: ICD-10-PCS | Mod: CPTII,S$GLB,, | Performed by: PSYCHIATRY & NEUROLOGY

## 2021-09-27 PROCEDURE — 99999 PR PBB SHADOW E&M-EST. PATIENT-LVL V: CPT | Mod: PBBFAC,,, | Performed by: PSYCHIATRY & NEUROLOGY

## 2021-09-27 PROCEDURE — 1159F MED LIST DOCD IN RCRD: CPT | Mod: CPTII,S$GLB,, | Performed by: PSYCHIATRY & NEUROLOGY

## 2021-09-27 PROCEDURE — 1100F PTFALLS ASSESS-DOCD GE2>/YR: CPT | Mod: CPTII,S$GLB,, | Performed by: PSYCHIATRY & NEUROLOGY

## 2021-09-27 PROCEDURE — 3074F SYST BP LT 130 MM HG: CPT | Mod: CPTII,S$GLB,, | Performed by: PSYCHIATRY & NEUROLOGY

## 2021-09-27 PROCEDURE — 1126F PR PAIN SEVERITY QUANTIFIED, NO PAIN PRESENT: ICD-10-PCS | Mod: CPTII,S$GLB,, | Performed by: PSYCHIATRY & NEUROLOGY

## 2021-09-27 PROCEDURE — 99215 OFFICE O/P EST HI 40 MIN: CPT | Mod: S$GLB,,, | Performed by: PSYCHIATRY & NEUROLOGY

## 2021-09-27 RX ORDER — MULTIVITAMIN
1 TABLET ORAL DAILY
COMMUNITY
End: 2022-07-20

## 2021-09-29 ENCOUNTER — PATIENT MESSAGE (OUTPATIENT)
Dept: NEUROLOGY | Facility: CLINIC | Age: 66
End: 2021-09-29

## 2021-10-08 ENCOUNTER — OFFICE VISIT (OUTPATIENT)
Dept: PAIN MEDICINE | Facility: CLINIC | Age: 66
End: 2021-10-08
Payer: MEDICARE

## 2021-10-08 VITALS
OXYGEN SATURATION: 97 % | HEART RATE: 69 BPM | DIASTOLIC BLOOD PRESSURE: 70 MMHG | SYSTOLIC BLOOD PRESSURE: 145 MMHG | HEIGHT: 67 IN | WEIGHT: 214.38 LBS | BODY MASS INDEX: 33.65 KG/M2

## 2021-10-08 DIAGNOSIS — M54.2 NECK PAIN: ICD-10-CM

## 2021-10-08 DIAGNOSIS — M54.12 CERVICAL RADICULOPATHY: Primary | ICD-10-CM

## 2021-10-08 PROCEDURE — 1159F PR MEDICATION LIST DOCUMENTED IN MEDICAL RECORD: ICD-10-PCS | Mod: CPTII,S$GLB,, | Performed by: ANESTHESIOLOGY

## 2021-10-08 PROCEDURE — 1125F PR PAIN SEVERITY QUANTIFIED, PAIN PRESENT: ICD-10-PCS | Mod: CPTII,S$GLB,, | Performed by: ANESTHESIOLOGY

## 2021-10-08 PROCEDURE — 3078F PR MOST RECENT DIASTOLIC BLOOD PRESSURE < 80 MM HG: ICD-10-PCS | Mod: CPTII,S$GLB,, | Performed by: ANESTHESIOLOGY

## 2021-10-08 PROCEDURE — 1125F AMNT PAIN NOTED PAIN PRSNT: CPT | Mod: CPTII,S$GLB,, | Performed by: ANESTHESIOLOGY

## 2021-10-08 PROCEDURE — 1100F PTFALLS ASSESS-DOCD GE2>/YR: CPT | Mod: CPTII,S$GLB,, | Performed by: ANESTHESIOLOGY

## 2021-10-08 PROCEDURE — 3288F FALL RISK ASSESSMENT DOCD: CPT | Mod: CPTII,S$GLB,, | Performed by: ANESTHESIOLOGY

## 2021-10-08 PROCEDURE — 1160F PR REVIEW ALL MEDS BY PRESCRIBER/CLIN PHARMACIST DOCUMENTED: ICD-10-PCS | Mod: CPTII,S$GLB,, | Performed by: ANESTHESIOLOGY

## 2021-10-08 PROCEDURE — 3008F BODY MASS INDEX DOCD: CPT | Mod: CPTII,S$GLB,, | Performed by: ANESTHESIOLOGY

## 2021-10-08 PROCEDURE — 99204 OFFICE O/P NEW MOD 45 MIN: CPT | Mod: S$GLB,,, | Performed by: ANESTHESIOLOGY

## 2021-10-08 PROCEDURE — 3078F DIAST BP <80 MM HG: CPT | Mod: CPTII,S$GLB,, | Performed by: ANESTHESIOLOGY

## 2021-10-08 PROCEDURE — 99204 PR OFFICE/OUTPT VISIT, NEW, LEVL IV, 45-59 MIN: ICD-10-PCS | Mod: S$GLB,,, | Performed by: ANESTHESIOLOGY

## 2021-10-08 PROCEDURE — 99999 PR PBB SHADOW E&M-EST. PATIENT-LVL III: CPT | Mod: PBBFAC,,, | Performed by: ANESTHESIOLOGY

## 2021-10-08 PROCEDURE — 99999 PR PBB SHADOW E&M-EST. PATIENT-LVL III: ICD-10-PCS | Mod: PBBFAC,,, | Performed by: ANESTHESIOLOGY

## 2021-10-08 PROCEDURE — 3077F PR MOST RECENT SYSTOLIC BLOOD PRESSURE >= 140 MM HG: ICD-10-PCS | Mod: CPTII,S$GLB,, | Performed by: ANESTHESIOLOGY

## 2021-10-08 PROCEDURE — 1100F PR PT FALLS ASSESS DOC 2+ FALLS/FALL W/INJURY/YR: ICD-10-PCS | Mod: CPTII,S$GLB,, | Performed by: ANESTHESIOLOGY

## 2021-10-08 PROCEDURE — 3288F PR FALLS RISK ASSESSMENT DOCUMENTED: ICD-10-PCS | Mod: CPTII,S$GLB,, | Performed by: ANESTHESIOLOGY

## 2021-10-08 PROCEDURE — 3077F SYST BP >= 140 MM HG: CPT | Mod: CPTII,S$GLB,, | Performed by: ANESTHESIOLOGY

## 2021-10-08 PROCEDURE — 1159F MED LIST DOCD IN RCRD: CPT | Mod: CPTII,S$GLB,, | Performed by: ANESTHESIOLOGY

## 2021-10-08 PROCEDURE — 3008F PR BODY MASS INDEX (BMI) DOCUMENTED: ICD-10-PCS | Mod: CPTII,S$GLB,, | Performed by: ANESTHESIOLOGY

## 2021-10-08 PROCEDURE — 1160F RVW MEDS BY RX/DR IN RCRD: CPT | Mod: CPTII,S$GLB,, | Performed by: ANESTHESIOLOGY

## 2021-10-08 RX ORDER — METHYLPREDNISOLONE 4 MG/1
TABLET ORAL
Qty: 1 PACKAGE | Refills: 0 | Status: SHIPPED | OUTPATIENT
Start: 2021-10-08 | End: 2021-10-29

## 2021-10-08 RX ORDER — PREGABALIN 75 MG/1
75 CAPSULE ORAL 2 TIMES DAILY
Qty: 60 CAPSULE | Refills: 0 | Status: SHIPPED | OUTPATIENT
Start: 2021-10-08 | End: 2022-01-12

## 2021-10-15 ENCOUNTER — PATIENT MESSAGE (OUTPATIENT)
Dept: PAIN MEDICINE | Facility: CLINIC | Age: 66
End: 2021-10-15

## 2021-10-15 RX ORDER — TRAMADOL HYDROCHLORIDE 50 MG/1
50 TABLET ORAL EVERY 12 HOURS PRN
Qty: 14 TABLET | Refills: 0 | Status: SHIPPED | OUTPATIENT
Start: 2021-10-15 | End: 2022-01-12

## 2022-02-01 ENCOUNTER — OFFICE VISIT (OUTPATIENT)
Dept: FAMILY MEDICINE | Facility: CLINIC | Age: 67
End: 2022-02-01
Payer: MEDICARE

## 2022-02-01 ENCOUNTER — OFFICE VISIT (OUTPATIENT)
Dept: RHEUMATOLOGY | Facility: CLINIC | Age: 67
End: 2022-02-01
Payer: MEDICARE

## 2022-02-01 VITALS
WEIGHT: 220.13 LBS | HEART RATE: 66 BPM | BODY MASS INDEX: 34.48 KG/M2 | DIASTOLIC BLOOD PRESSURE: 81 MMHG | SYSTOLIC BLOOD PRESSURE: 142 MMHG

## 2022-02-01 VITALS
DIASTOLIC BLOOD PRESSURE: 84 MMHG | WEIGHT: 220.25 LBS | BODY MASS INDEX: 34.49 KG/M2 | HEART RATE: 81 BPM | OXYGEN SATURATION: 97 % | SYSTOLIC BLOOD PRESSURE: 124 MMHG

## 2022-02-01 DIAGNOSIS — E78.5 DYSLIPIDEMIA: Primary | ICD-10-CM

## 2022-02-01 DIAGNOSIS — M35.00 SJOGREN'S SYNDROME WITHOUT EXTRAGLANDULAR INVOLVEMENT: Primary | ICD-10-CM

## 2022-02-01 DIAGNOSIS — R79.89 ABNORMAL TSH: ICD-10-CM

## 2022-02-01 DIAGNOSIS — M32.13 OTHER SYSTEMIC LUPUS ERYTHEMATOSUS WITH LUNG INVOLVEMENT: ICD-10-CM

## 2022-02-01 DIAGNOSIS — I25.84 CORONARY ARTERY DISEASE DUE TO CALCIFIED CORONARY LESION: ICD-10-CM

## 2022-02-01 DIAGNOSIS — I50.9 CHRONIC CONGESTIVE HEART FAILURE, UNSPECIFIED HEART FAILURE TYPE: ICD-10-CM

## 2022-02-01 DIAGNOSIS — K21.9 GASTROESOPHAGEAL REFLUX DISEASE: ICD-10-CM

## 2022-02-01 DIAGNOSIS — R05.9 COUGH: ICD-10-CM

## 2022-02-01 DIAGNOSIS — I25.10 CORONARY ARTERY DISEASE DUE TO CALCIFIED CORONARY LESION: ICD-10-CM

## 2022-02-01 DIAGNOSIS — I25.10 CORONARY ARTERY DISEASE INVOLVING NATIVE CORONARY ARTERY OF NATIVE HEART WITHOUT ANGINA PECTORIS: ICD-10-CM

## 2022-02-01 PROBLEM — M32.8 OTHER FORMS OF SYSTEMIC LUPUS ERYTHEMATOSUS: Status: ACTIVE | Noted: 2022-02-01

## 2022-02-01 PROCEDURE — 99999 PR PBB SHADOW E&M-EST. PATIENT-LVL IV: ICD-10-PCS | Mod: PBBFAC,,, | Performed by: INTERNAL MEDICINE

## 2022-02-01 PROCEDURE — 99215 PR OFFICE/OUTPT VISIT, EST, LEVL V, 40-54 MIN: ICD-10-PCS | Mod: S$GLB,,, | Performed by: INTERNAL MEDICINE

## 2022-02-01 PROCEDURE — 99999 PR PBB SHADOW E&M-EST. PATIENT-LVL IV: CPT | Mod: PBBFAC,,, | Performed by: INTERNAL MEDICINE

## 2022-02-01 PROCEDURE — 99417 PR PROLONGED SVC, OUTPT, W/WO DIRECT PT CONTACT,  EA ADDTL 15 MIN: ICD-10-PCS | Mod: S$GLB,,, | Performed by: INTERNAL MEDICINE

## 2022-02-01 PROCEDURE — 99417 PROLNG OP E/M EACH 15 MIN: CPT | Mod: S$GLB,,, | Performed by: INTERNAL MEDICINE

## 2022-02-01 PROCEDURE — 99204 OFFICE O/P NEW MOD 45 MIN: CPT | Mod: S$PBB,,, | Performed by: INTERNAL MEDICINE

## 2022-02-01 PROCEDURE — 99204 PR OFFICE/OUTPT VISIT, NEW, LEVL IV, 45-59 MIN: ICD-10-PCS | Mod: S$PBB,,, | Performed by: INTERNAL MEDICINE

## 2022-02-01 PROCEDURE — 99214 OFFICE O/P EST MOD 30 MIN: CPT | Mod: PBBFAC,PO | Performed by: INTERNAL MEDICINE

## 2022-02-01 PROCEDURE — 99215 OFFICE O/P EST HI 40 MIN: CPT | Mod: S$GLB,,, | Performed by: INTERNAL MEDICINE

## 2022-02-01 RX ORDER — DIPHENHYDRAMINE HYDROCHLORIDE 50 MG/ML
50 INJECTION INTRAMUSCULAR; INTRAVENOUS ONCE AS NEEDED
OUTPATIENT
Start: 2022-02-08

## 2022-02-01 RX ORDER — FUROSEMIDE 20 MG/1
20 TABLET ORAL DAILY
Qty: 90 TABLET | Refills: 3 | Status: SHIPPED | OUTPATIENT
Start: 2022-02-01 | End: 2023-01-22

## 2022-02-01 RX ORDER — BENZONATATE 100 MG/1
CAPSULE ORAL
Qty: 45 CAPSULE | Refills: 0 | Status: SHIPPED | OUTPATIENT
Start: 2022-02-01 | End: 2022-07-04

## 2022-02-01 RX ORDER — METHYLPREDNISOLONE SOD SUCC 125 MG
80 VIAL (EA) INJECTION
OUTPATIENT
Start: 2022-02-08

## 2022-02-01 RX ORDER — CLOPIDOGREL BISULFATE 75 MG/1
75 TABLET ORAL DAILY
Qty: 90 TABLET | Refills: 3 | Status: SHIPPED | OUTPATIENT
Start: 2022-02-01 | End: 2023-01-22

## 2022-02-01 RX ORDER — OMEPRAZOLE 20 MG/1
20 CAPSULE, DELAYED RELEASE ORAL DAILY
Qty: 90 CAPSULE | Refills: 3 | Status: SHIPPED | OUTPATIENT
Start: 2022-02-01 | End: 2023-01-22

## 2022-02-01 RX ORDER — ROSUVASTATIN CALCIUM 20 MG/1
20 TABLET, COATED ORAL DAILY
Qty: 90 TABLET | Refills: 3 | Status: SHIPPED | OUTPATIENT
Start: 2022-02-01 | End: 2022-04-28

## 2022-02-01 RX ORDER — POTASSIUM CHLORIDE 20 MEQ/1
20 TABLET, EXTENDED RELEASE ORAL DAILY
Qty: 90 TABLET | Refills: 3 | Status: SHIPPED | OUTPATIENT
Start: 2022-02-01 | End: 2023-01-22

## 2022-02-01 RX ORDER — ACETAMINOPHEN 325 MG/1
650 TABLET ORAL
OUTPATIENT
Start: 2022-02-08

## 2022-02-01 RX ORDER — SODIUM CHLORIDE 0.9 % (FLUSH) 0.9 %
10 SYRINGE (ML) INJECTION
OUTPATIENT
Start: 2022-02-08

## 2022-02-01 RX ORDER — HEPARIN 100 UNIT/ML
500 SYRINGE INTRAVENOUS
OUTPATIENT
Start: 2022-02-08

## 2022-02-01 RX ORDER — DIPHENHYDRAMINE HYDROCHLORIDE 50 MG/ML
25 INJECTION INTRAMUSCULAR; INTRAVENOUS
OUTPATIENT
Start: 2022-02-08

## 2022-02-01 RX ORDER — EPINEPHRINE 0.3 MG/.3ML
0.3 INJECTION SUBCUTANEOUS ONCE AS NEEDED
OUTPATIENT
Start: 2022-02-08

## 2022-02-01 RX ORDER — ZINC GLUCONATE 50 MG
50 TABLET ORAL DAILY
COMMUNITY

## 2022-02-01 NOTE — PROGRESS NOTES
Subjective:          Chief Complaint: Aye Montanez is a 66 y.o. female who had concerns including 4 month follow up.    HPI:    Patient is a 67-year-old female she has a history of Sjogren syndrome (estimate start 2015)   To review her Rheum diagnosis:  presumed Sjogrens with ? of SLE  +OSMAN 1:1280 speckled  High titer SSA and SSB with + dry eye and dry mouth  dsDNA neg, Singh neg, RNP neg. C3 and C4 WNL.   + hx of thrombocytopenia, + leukopenia, +polyarthritis, + sicca, +Raynauds  RF and CCP negative.   She has c/o no strength in her hands, knees are painful. Feet burn with standing. No overt swelling of joints with the exception of left voler wrist synovial cyst.    Diffuse pruritis w/o visible rash.   Trialed HCQ but patient concerned about macula and discontinued. She is followed by Dr. Fan the [facility] eye clinic.  She was taken off HCQ no active maculopathy but she was concerned about ASE. .   Trialed on MTX and failed.  Restasis despite burning back on  +punctal plugs. Did try Xiidra- ? If ever taken this visit.  She notes burning in her eyes.  Photophobia occasional redness to her eyes. burning Restasis no longer tolerable.    Never salagen/Evozac. . She did try xylimelts      Patient developed neurologic events starting 1/2021- but we discontinued nifedipine (Raynaud's) in event this was leading to hypotension. Patient described episode  with disorientation, tingling lips.  repeat episode 5/25/21 with vertigo and near syncope and again 6/2/21 slurred speech, right facial droop, Imagin has been neg. for acute changes. on 6/3/21 similar event but now on left. 8/2021 another episode.   Dr. Mccurdy performed an EMG which showed mild left C6 radiculopathy. MRI showed multilevel degenerative changes and a cyst at C6/7 on the right.   EEG 2o21 WNL.   Neuro dx: complex migrain vs TIA changes ASA to Plavix.   right leg numbness referred to vestibular therapy.   HA: consider occipital nerve block  Cervical spine  and left arm symptoms Pain management. trial with Tramadol for now and medrol and if not improved will discuss SONYA once safe to hold Plavix. She elected not to take Lyrica. no reported ASE.     Patient has been having SOB, dizzyness. she has maximized therapy with Pulm with little improvement. Her last PFTs show no diffusion impairment, not hypoxic   PFT Results  02/26/2019: FEV1 2.20 (89%), FEV1/FVC 85, TLC 93%, DLCO 111%    Acute Pleurisy 12/2021 with right chest pain and pain with inspiration. COVID testing neg. Prednisone and Levaquin started. +productive cough.   : CTA neg for PE. but reticulonodular opacities compatible with pneumonitis ANGELIQUE and LLL with patchy infiltrate in the RML and RLL. Trace right pleural effusion.   Seen with Pulm as of 1/12/22 given azithro   Cardiac: ECHO normal EF, no shunt, The estimated PA systolic pressure is 35 mmHg  completed stress testing with Dr. Peter ([facility]) pt states no new changes. +CAD with stenting 2017.         9/2021: continued neurologic events so we held off on starting Benlysta until completed work up. More recently presented to the ER with possible TIA.  We discontinued nifedipine for possible hypotensive episode are Raynaud's have been stable.   Events surrounding the 1st episode seem to be blood donation bilateral vertebral artery stenosis presenting with nystagmus vertigo and ataxia remark resolved with IV hydration and discontinuation of nifedipine.  Second event on did not involve a similar constellation of symptoms and just involved numbness and tingling.   continues with dizziness, mild SOB pending cardiac w/up with Dr. Peter      1/2021: flare with burning eyes, joint aches and pains and Raynauds persistent for 10 days. She tried hot shower/soak which helped temporarily. Started nifedipine 30mg daily 1/14/21 by 1/22/21 with episode of disorientation and hypotension. Full work up no seizure, no migraine she was aware at the time. Was having dizziness. No  motor weakness. Slurred speech. Imaging revealed b/l vetebral arteries at 50%. And no seizure activity.   Off Nifedipine at BP marked improved. Unfortunately very effective for Raynauds.     Submitted for Benlysta with no affordable option for home injection despite [place] support.     S/p viscosupplementation in knees with ortho 3/2018      Component      Latest Ref Rng & Units 8/16/2018   Anti Sm Antibody      0.00 - 19.99 EU 2.85   Anti-Sm Interpretation      Negative Negative   Anti-SSA Antibody      0.00 - 19.99 .02 (H)   Anti-SSA Interpretation      Negative Positive (A)   Anti-SSB Antibody      0.00 - 19.99 .37 (H)   Anti-SSB Interpretation      Negative Positive (A)   Sed Rate      0 - 20 mm/Hr 33 (H)   CRP      0.0 - 8.2 mg/L 3.6   OSMAN Screen      Negative <1:160 Positive (A)   Complement (C-3)      50 - 180 mg/dL 129   Complement (C-4)      11 - 44 mg/dL 22   Complement,Total, Serum      42 - 95 U/mL 85   Rheumatoid Factor      0.0 - 15.0 IU/mL <10.0   CCP Antibodies      <5.0 U/mL <0.5     REVIEW OF SYSTEMS:    Review of Systems   Constitutional: Positive for malaise/fatigue. Negative for fever and weight loss.   HENT: Negative for sore throat.    Eyes: Negative for double vision, photophobia and redness.   Respiratory: Negative for cough, shortness of breath and wheezing.    Cardiovascular: Negative for chest pain, palpitations and orthopnea.   Gastrointestinal: Negative for abdominal pain, constipation and diarrhea.   Genitourinary: Negative for dysuria, hematuria and urgency.   Musculoskeletal: Positive for joint pain. Negative for back pain and myalgias.   Skin: Positive for itching. Negative for rash.   Neurological: Negative for dizziness, tingling, focal weakness and headaches.   Endo/Heme/Allergies: Does not bruise/bleed easily.   Psychiatric/Behavioral: Negative for depression, hallucinations and suicidal ideas.               Objective:            Past Medical History:   Diagnosis  Date    Sjogren's syndrome 12/5/2016     Family History   Problem Relation Age of Onset    Cancer Mother     Ovarian cancer Mother 40    Cancer Father         esophageal    Esophageal cancer Father     Stroke Maternal Aunt     Rheum arthritis Maternal Aunt     Rheum arthritis Paternal Uncle     Breast cancer Maternal Cousin     Breast cancer Maternal Cousin      Social History     Tobacco Use    Smoking status: Never Smoker    Smokeless tobacco: Never Used   Substance Use Topics    Alcohol use: Yes     Comment: rarely    Drug use: No         Current Outpatient Medications on File Prior to Visit   Medication Sig Dispense Refill    acetaminophen (TYLENOL) 500 MG tablet Take 1,000 mg by mouth every 6 (six) hours as needed for Pain.      clopidogreL (PLAVIX) 75 mg tablet Take 1 tablet (75 mg total) by mouth once daily. Take with aspirin 81 mg for 21 days. Then, stop aspirin and take just clopidogrel thereafter. 90 tablet 3    cyanocobalamin (VITAMIN B-12) 1,000 mcg/mL injection Inject 1 mL (1,000 mcg total) into the skin every 30 days. 3 mL 3    EScitalopram oxalate (LEXAPRO) 10 MG tablet Take 1 tablet (10 mg total) by mouth once daily. 90 tablet 1    fluorometholone 0.1% (FML) 0.1 % DrpS       furosemide (LASIX) 20 MG tablet TAKE 1 TABLET EVERY DAY 90 tablet 3    magnesium oxide (MAG-OX) 400 mg (241.3 mg magnesium) tablet Take 400 mg by mouth once daily.      montelukast (SINGULAIR) 10 mg tablet TAKE 1 TABLET EVERY EVENING (Patient taking differently: Take 10 mg by mouth daily as needed.) 90 tablet 3    multivitamin (THERAGRAN) per tablet Take 1 tablet by mouth once daily.      omeprazole (PRILOSEC) 20 MG capsule Take 1 capsule (20 mg total) by mouth once daily. 90 capsule 3    potassium chloride SA (K-DUR,KLOR-CON) 20 MEQ tablet TAKE 1 TABLET EVERY DAY 90 tablet 3    rosuvastatin (CRESTOR) 20 MG tablet Take 1 tablet (20 mg total) by mouth once daily. 90 tablet 3    syringe with needle 3 mL  "22 gauge x 3/4" Syrg 6 Syringes by Misc.(Non-Drug; Combo Route) route every 30 days. Use to inject cyanocobalamin once every 30 days.      zinc gluconate 50 mg tablet Take 50 mg by mouth once daily.      albuterol (ACCUNEB) 1.25 mg/3 mL Nebu Take 6 mLs (2.5 mg total) by nebulization every 6 (six) hours as needed. Rescue (Patient not taking: Reported on 2/1/2022) 3 each 3    hydrocortisone 2.5 % cream Apply topically 2 (two) times daily. (Patient not taking: Reported on 2/1/2022) 1 Tube 1    ibuprofen (ADVIL,MOTRIN) 600 MG tablet Take 1 tablet (600 mg total) by mouth every 6 (six) hours as needed for Pain. (Patient not taking: Reported on 2/1/2022) 20 tablet 0     No current facility-administered medications on file prior to visit.       Vitals:    02/01/22 1013   BP: (!) 142/81   Pulse: 66       Physical Exam:    Physical Exam  Constitutional:       Appearance: Normal appearance. She is well-developed.   HENT:      Nose: No septal deviation.      Mouth/Throat:      Mouth: No oral lesions.   Eyes:      Conjunctiva/sclera:      Right eye: Right conjunctiva is not injected.      Left eye: Left conjunctiva is not injected.      Pupils: Pupils are equal, round, and reactive to light.   Neck:      Thyroid: No thyroid mass or thyromegaly.      Vascular: No JVD.   Cardiovascular:      Rate and Rhythm: Normal rate and regular rhythm.      Pulses: Normal pulses.      Comments: No edema  Pulmonary:      Effort: Pulmonary effort is normal.      Breath sounds: Normal breath sounds.   Abdominal:      Palpations: Abdomen is soft.   Musculoskeletal:      Right shoulder: No swelling or tenderness. Normal range of motion.      Left shoulder: No swelling or tenderness. Normal range of motion.      Right elbow: No swelling. Normal range of motion. No tenderness.      Left elbow: No swelling. Normal range of motion. No tenderness.      Right wrist: Tenderness present. No swelling. Normal range of motion.      Left wrist: Tenderness " present. No swelling. Normal range of motion.      Right hand: Tenderness present.      Left hand: Tenderness present.      Right hip: Normal range of motion. Normal strength.      Left hip: No tenderness. Normal range of motion.      Right knee: No swelling. Normal range of motion. No tenderness.      Left knee: No swelling. Normal range of motion. No tenderness.      Right ankle: No swelling. No tenderness. Normal range of motion.      Left ankle: No swelling. No tenderness. Normal range of motion.      Comments: Patient has some tenderness on the right MCP no overt synovitis no deformities.  No evidence of Raynaud's today but historical triphasic color change  She has marked crepitation bilateral knees with limitation in flexion she has full extension no laxity within the joint no active effusion.  She has pain with restriction in internal rotation of the right hip.  No other synovitis or metatarsalgia was noted.  The   Lymphadenopathy:      Cervical: No cervical adenopathy.   Skin:     General: Skin is dry.   Neurological:      Deep Tendon Reflexes: Reflexes are normal and symmetric.               Assessment:       Encounter Diagnosis   Name Primary?   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  Increasing joint stiffness and swelling. We have monitored SANDOVAL/SOB ? Some ILD-CTD.    She was hesitant to try salagen.   Failed MTX with ASE   Discussed the joints, increasing leucopenia, worsening dry eyes, malaise and fatigue. And recent pleurisy unclear if this was infectious but chronic SOB/SANDOVAL despite maximized therapy.    C/i HCQ with maculopathy (? If there was a maculopathy-will need to review [facility] notes)   Stable on Mobic 7.5mg BID keep this    Ok for percogesic PRN breakthrough pain. Seems to be working.     Quantiferon  gold. Is negative.     Discussed Benlysta IV therapy. Will see if this is covered better on insurance. New insurance this year as well.     Follow up in about 3 months (around 5/1/2022).        F/u 4 months  Thank you for allowing me to participate in the care of this very pleasant patient.    71min consultation with greater than 50% spent in counseling, chart review and coordination of care. All questions answered.          Orders in for benlysta IV with infusion  Labs will be drawn in 8 weeks with infusion   F/u with me in 3 months.   Dr. RODRÍGUEZ

## 2022-02-01 NOTE — PROGRESS NOTES
Subjective:       Patient ID: Aye Montanez is a 66 y.o. female.    Chief Complaint: Establish Care    Sjogrens - Dr Bee  + Joint pain.    ILD? - still having SOB with exertional hypoxia; residual dry cough; January had bronchitis and pleurisy.  Dr Cressy Raynauds     TIA x3 2021.  Last year episode of altered mental status.  Dr Mohan, Neurologist put back Plavix until likely April.  Initially thought to have been a CVA and received tPa.  States that neurology did not feel it was a stroke.   Complex migraine? - Episodes of muscle spasms in face, slurred voice, and right leg twisting uncontrollably.     CAD - s/p stents 2016.  NM stress test normal No chest pain.    HLD - on stqatin     Neck vertebrae.  Left arm numbness in forearm distal.  Pain in left neck causing headaches.  No procedures until off Plavix.     Depression - controlled; weaning off Lexapro.  Now on twice weekly.     Was on thyroid medicine in past but was on biotin.  Off for 2 months now.          Review of Systems   Constitutional: Positive for fatigue. Negative for appetite change and fever.   HENT: Negative for nosebleeds and trouble swallowing.    Eyes: Negative for discharge and visual disturbance.   Respiratory: Positive for cough and shortness of breath. Negative for choking.    Cardiovascular: Negative for chest pain and palpitations.   Gastrointestinal: Negative for abdominal pain, nausea and vomiting.   Musculoskeletal: Positive for gait problem. Negative for arthralgias and joint swelling.   Skin: Negative for rash and wound.   Neurological: Negative for dizziness and syncope.   Psychiatric/Behavioral: Negative for confusion and dysphoric mood.       Objective:      Vitals:    02/01/22 1555   BP: 124/84   Pulse: 81     Physical Exam  Vitals reviewed.   Constitutional:       Appearance: She is well-nourished.   Eyes:      Extraocular Movements: EOM normal.      Conjunctiva/sclera: Conjunctivae normal.   Cardiovascular:      Rate  and Rhythm: Normal rate and regular rhythm.   Pulmonary:      Effort: Pulmonary effort is normal.      Comments: + expiratory wheeze  Musculoskeletal:      Cervical back: Normal range of motion.      Comments: Normal ROM bilateral    Skin:     General: Skin is warm and dry.   Neurological:      Cranial Nerves: No cranial nerve deficit (grossly intact).   Psychiatric:         Mood and Affect: Mood and affect normal.      Comments: Alert and orientated           Assessment:       1. Dyslipidemia    2. Coronary artery disease involving native coronary artery of native heart without angina pectoris    3. Cough    4. Abnormal TSH    5. Chronic congestive heart failure, unspecified heart failure type    6. Gastroesophageal reflux disease    7. Coronary artery disease, s/p stenting LAD x 2 10/2017        Plan:       Dyslipidemia  -     Lipid Panel; Future; Expected date: 02/01/2022  -     rosuvastatin (CRESTOR) 20 MG tablet; Take 1 tablet (20 mg total) by mouth once daily.  Dispense: 90 tablet; Refill: 3    Coronary artery disease involving native coronary artery of native heart without angina pectoris  -     Ambulatory referral/consult to Cardiology; Future; Expected date: 02/08/2022  -     Lipid Panel; Future; Expected date: 02/01/2022  -     clopidogreL (PLAVIX) 75 mg tablet; Take 1 tablet (75 mg total) by mouth once daily. Take with aspirin 81 mg for 21 days. Then, stop aspirin and take just clopidogrel thereafter.  Dispense: 90 tablet; Refill: 3  -     rosuvastatin (CRESTOR) 20 MG tablet; Take 1 tablet (20 mg total) by mouth once daily.  Dispense: 90 tablet; Refill: 3    Cough  -     benzonatate (TESSALON) 100 MG capsule; 1 - 2 po every 6 hours prn cough  Dispense: 45 capsule; Refill: 0    Abnormal TSH  -     TSH; Future; Expected date: 02/01/2022    Chronic congestive heart failure, unspecified heart failure type  -     furosemide (LASIX) 20 MG tablet; Take 1 tablet (20 mg total) by mouth once daily.  Dispense: 90  "tablet; Refill: 3    Gastroesophageal reflux disease  -     omeprazole (PRILOSEC) 20 MG capsule; Take 1 capsule (20 mg total) by mouth once daily.  Dispense: 90 capsule; Refill: 3    Coronary artery disease, s/p stenting LAD x 2 10/2017  -     potassium chloride SA (K-DUR,KLOR-CON) 20 MEQ tablet; Take 1 tablet (20 mEq total) by mouth once daily.  Dispense: 90 tablet; Refill: 3            Medication List with Changes/Refills   New Medications    BENZONATATE (TESSALON) 100 MG CAPSULE    1 - 2 po every 6 hours prn cough   Current Medications    ACETAMINOPHEN (TYLENOL) 500 MG TABLET    Take 1,000 mg by mouth every 6 (six) hours as needed for Pain.    ALBUTEROL (ACCUNEB) 1.25 MG/3 ML NEBU    Take 6 mLs (2.5 mg total) by nebulization every 6 (six) hours as needed. Rescue    CYANOCOBALAMIN (VITAMIN B-12) 1,000 MCG/ML INJECTION    Inject 1 mL (1,000 mcg total) into the skin every 30 days.    FLUOROMETHOLONE 0.1% (FML) 0.1 % DRPS        HYDROCORTISONE 2.5 % CREAM    Apply topically 2 (two) times daily.    IBUPROFEN (ADVIL,MOTRIN) 600 MG TABLET    Take 1 tablet (600 mg total) by mouth every 6 (six) hours as needed for Pain.    MAGNESIUM OXIDE (MAG-OX) 400 MG (241.3 MG MAGNESIUM) TABLET    Take 400 mg by mouth once daily.    MONTELUKAST (SINGULAIR) 10 MG TABLET    TAKE 1 TABLET EVERY EVENING    MULTIVITAMIN (THERAGRAN) PER TABLET    Take 1 tablet by mouth once daily.    SYRINGE WITH NEEDLE 3 ML 22 GAUGE X 3/4" SYRG    6 Syringes by Misc.(Non-Drug; Combo Route) route every 30 days. Use to inject cyanocobalamin once every 30 days.    ZINC GLUCONATE 50 MG TABLET    Take 50 mg by mouth once daily.   Changed and/or Refilled Medications    Modified Medication Previous Medication    CLOPIDOGREL (PLAVIX) 75 MG TABLET clopidogreL (PLAVIX) 75 mg tablet       Take 1 tablet (75 mg total) by mouth once daily. Take with aspirin 81 mg for 21 days. Then, stop aspirin and take just clopidogrel thereafter.    Take 1 tablet (75 mg total) by " mouth once daily. Take with aspirin 81 mg for 21 days. Then, stop aspirin and take just clopidogrel thereafter.    FUROSEMIDE (LASIX) 20 MG TABLET furosemide (LASIX) 20 MG tablet       Take 1 tablet (20 mg total) by mouth once daily.    TAKE 1 TABLET EVERY DAY    OMEPRAZOLE (PRILOSEC) 20 MG CAPSULE omeprazole (PRILOSEC) 20 MG capsule       Take 1 capsule (20 mg total) by mouth once daily.    Take 1 capsule (20 mg total) by mouth once daily.    POTASSIUM CHLORIDE SA (K-DUR,KLOR-CON) 20 MEQ TABLET potassium chloride SA (K-DUR,KLOR-CON) 20 MEQ tablet       Take 1 tablet (20 mEq total) by mouth once daily.    TAKE 1 TABLET EVERY DAY    ROSUVASTATIN (CRESTOR) 20 MG TABLET rosuvastatin (CRESTOR) 20 MG tablet       Take 1 tablet (20 mg total) by mouth once daily.    Take 1 tablet (20 mg total) by mouth once daily.   Discontinued Medications    ESCITALOPRAM OXALATE (LEXAPRO) 10 MG TABLET    Take 1 tablet (10 mg total) by mouth once daily.       Continue current management and monitor.    Counseled on regular exercise, maintenance of a healthy weight, balanced diet rich in fruits/vegetables and lean protein, and avoidance of unhealthy habits like smoking and excessive alcohol intake.   Also, counseled on importance of being compliant with medication, health appointments, diet and exercise.     Follow up in about 6 months (around 8/1/2022).

## 2022-02-03 ENCOUNTER — PATIENT MESSAGE (OUTPATIENT)
Dept: RHEUMATOLOGY | Facility: CLINIC | Age: 67
End: 2022-02-03
Payer: MEDICARE

## 2022-02-21 ENCOUNTER — TELEPHONE (OUTPATIENT)
Dept: INFUSION THERAPY | Facility: HOSPITAL | Age: 67
End: 2022-02-21
Payer: MEDICARE

## 2022-02-21 NOTE — TELEPHONE ENCOUNTER
Comment Entered User   benlysta 2/4/2022  1:46 PM Annemarie Nash   spoke with patient she want to know cost sent email to vianney 2/4/2022  1:55 PM Annemarie Nash        General Information            Good afternoon,    Per [employer],  $665.83 per infusion until patient meet their $3500 OOP then it will be covered at 100%.     Spoke with patient and informed her of OOP responsibility. Patient stated she will talk it over with spouse and will call when ready to schedule infusion.     Sent message to md office to let them know of above

## 2022-02-23 ENCOUNTER — PATIENT MESSAGE (OUTPATIENT)
Dept: RHEUMATOLOGY | Facility: CLINIC | Age: 67
End: 2022-02-23
Payer: MEDICARE

## 2022-03-10 ENCOUNTER — PATIENT MESSAGE (OUTPATIENT)
Dept: RHEUMATOLOGY | Facility: CLINIC | Age: 67
End: 2022-03-10
Payer: MEDICARE

## 2022-03-17 ENCOUNTER — PATIENT MESSAGE (OUTPATIENT)
Dept: GASTROENTEROLOGY | Facility: CLINIC | Age: 67
End: 2022-03-17
Payer: MEDICARE

## 2022-03-18 ENCOUNTER — PATIENT MESSAGE (OUTPATIENT)
Dept: NEUROLOGY | Facility: CLINIC | Age: 67
End: 2022-03-18
Payer: MEDICARE

## 2022-03-18 ENCOUNTER — TELEPHONE (OUTPATIENT)
Dept: GASTROENTEROLOGY | Facility: CLINIC | Age: 67
End: 2022-03-18
Payer: MEDICARE

## 2022-03-18 ENCOUNTER — PATIENT MESSAGE (OUTPATIENT)
Dept: GASTROENTEROLOGY | Facility: CLINIC | Age: 67
End: 2022-03-18
Payer: MEDICARE

## 2022-03-18 NOTE — TELEPHONE ENCOUNTER
"Please read message per Dr. Skinner, "Yes. OK to pass along to GI department. We say to wait to hold any antiplatelet agents until 9 months after the last TIA/stroke event to minimize risk of recurrence while holding meds. Hold for the shortest time possible. Since it has been 9 months since her last event that I am aware of, it is OK to go forward with procedures. It is not without risk though and patient should be aware that having another event while holding the antiplatelet agents is a possibility."    "

## 2022-03-21 ENCOUNTER — PATIENT MESSAGE (OUTPATIENT)
Dept: GASTROENTEROLOGY | Facility: CLINIC | Age: 67
End: 2022-03-21
Payer: MEDICARE

## 2022-03-22 ENCOUNTER — PATIENT MESSAGE (OUTPATIENT)
Dept: RHEUMATOLOGY | Facility: CLINIC | Age: 67
End: 2022-03-22
Payer: MEDICARE

## 2022-03-22 ENCOUNTER — PATIENT MESSAGE (OUTPATIENT)
Dept: PAIN MEDICINE | Facility: CLINIC | Age: 67
End: 2022-03-22
Payer: MEDICARE

## 2022-03-23 ENCOUNTER — TELEPHONE (OUTPATIENT)
Dept: RHEUMATOLOGY | Facility: CLINIC | Age: 67
End: 2022-03-23
Payer: MEDICARE

## 2022-03-23 ENCOUNTER — TELEPHONE (OUTPATIENT)
Dept: RHEUMATOLOGY | Facility: CLINIC | Age: 67
End: 2022-03-23

## 2022-03-23 ENCOUNTER — OFFICE VISIT (OUTPATIENT)
Dept: RHEUMATOLOGY | Facility: CLINIC | Age: 67
End: 2022-03-23
Payer: MEDICARE

## 2022-03-23 VITALS
SYSTOLIC BLOOD PRESSURE: 139 MMHG | DIASTOLIC BLOOD PRESSURE: 76 MMHG | HEIGHT: 67 IN | HEART RATE: 69 BPM | WEIGHT: 219.94 LBS | BODY MASS INDEX: 34.52 KG/M2

## 2022-03-23 DIAGNOSIS — I73.00 RAYNAUD'S DISEASE WITHOUT GANGRENE: ICD-10-CM

## 2022-03-23 DIAGNOSIS — J84.9 INTERSTITIAL LUNG DISEASE: ICD-10-CM

## 2022-03-23 DIAGNOSIS — H35.9 MACULOPATHY: ICD-10-CM

## 2022-03-23 DIAGNOSIS — M32.13 OTHER SYSTEMIC LUPUS ERYTHEMATOSUS WITH LUNG INVOLVEMENT: Primary | ICD-10-CM

## 2022-03-23 DIAGNOSIS — R16.1 SPLENOMEGALY: ICD-10-CM

## 2022-03-23 PROCEDURE — 96372 PR INJECTION,THERAP/PROPH/DIAG2ST, IM OR SUBCUT: ICD-10-PCS | Mod: S$GLB,,, | Performed by: INTERNAL MEDICINE

## 2022-03-23 PROCEDURE — 99999 PR PBB SHADOW E&M-EST. PATIENT-LVL IV: ICD-10-PCS | Mod: PBBFAC,,, | Performed by: INTERNAL MEDICINE

## 2022-03-23 PROCEDURE — 99215 OFFICE O/P EST HI 40 MIN: CPT | Mod: 25,S$GLB,, | Performed by: INTERNAL MEDICINE

## 2022-03-23 PROCEDURE — 96372 THER/PROPH/DIAG INJ SC/IM: CPT | Mod: S$GLB,,, | Performed by: INTERNAL MEDICINE

## 2022-03-23 PROCEDURE — 99999 PR PBB SHADOW E&M-EST. PATIENT-LVL IV: CPT | Mod: PBBFAC,,, | Performed by: INTERNAL MEDICINE

## 2022-03-23 PROCEDURE — 99215 PR OFFICE/OUTPT VISIT, EST, LEVL V, 40-54 MIN: ICD-10-PCS | Mod: 25,S$GLB,, | Performed by: INTERNAL MEDICINE

## 2022-03-23 RX ORDER — KETOROLAC TROMETHAMINE 30 MG/ML
30 INJECTION, SOLUTION INTRAMUSCULAR; INTRAVENOUS
Status: DISCONTINUED | OUTPATIENT
Start: 2022-03-23 | End: 2022-03-23

## 2022-03-23 RX ORDER — METHYLPREDNISOLONE ACETATE 80 MG/ML
80 INJECTION, SUSPENSION INTRA-ARTICULAR; INTRALESIONAL; INTRAMUSCULAR; SOFT TISSUE
Status: COMPLETED | OUTPATIENT
Start: 2022-03-23 | End: 2022-03-23

## 2022-03-23 RX ORDER — MYCOPHENOLATE MOFETIL 500 MG/1
500 TABLET ORAL 2 TIMES DAILY
Qty: 60 TABLET | Refills: 3 | Status: SHIPPED | OUTPATIENT
Start: 2022-03-23 | End: 2022-07-04

## 2022-03-23 RX ORDER — BELIMUMAB 200 MG/ML
200 SOLUTION SUBCUTANEOUS WEEKLY
Qty: 4 ML | Refills: 11 | COMMUNITY
Start: 2022-03-23 | End: 2023-03-23

## 2022-03-23 RX ORDER — HYDROCODONE BITARTRATE AND ACETAMINOPHEN 5; 325 MG/1; MG/1
1-2 TABLET ORAL EVERY 6 HOURS PRN
Qty: 28 TABLET | Refills: 0 | Status: SHIPPED | OUTPATIENT
Start: 2022-03-23 | End: 2022-03-30

## 2022-03-23 RX ADMIN — METHYLPREDNISOLONE ACETATE 80 MG: 80 INJECTION, SUSPENSION INTRA-ARTICULAR; INTRALESIONAL; INTRAMUSCULAR; SOFT TISSUE at 03:03

## 2022-03-23 ASSESSMENT — ROUTINE ASSESSMENT OF PATIENT INDEX DATA (RAPID3)
MDHAQ FUNCTION SCORE: 1.8
PAIN SCORE: 7
FATIGUE SCORE: 2.2
PATIENT GLOBAL ASSESSMENT SCORE: 9
PSYCHOLOGICAL DISTRESS SCORE: 2.2
TOTAL RAPID3 SCORE: 7.33

## 2022-03-23 NOTE — PATIENT INSTRUCTIONS
Injections today : steroid   2. Hydrocodone just for severe pain  3. Starting Cellcept for Lupus/Sjogren : 500mg once daily for 4 days if no upset stomach then increase to 500mg twice daily.     4. Sending Benlysta to [facility] as we are not getting any further feedback about infusion funding help.                    5. Labs prior to next visit later in April.   6. Sending to Dr. Khoury to clarify if you have any retinal disease that would preclude you from taking plaquenil, bc with SLE the benefits in mortality and morbidity often outweigh the risk of the retinal disease. I really need to know if you can take this medication.

## 2022-03-23 NOTE — TELEPHONE ENCOUNTER
Forward message to provider for new referral  ----- Message from Mirna Holden sent at 3/23/2022  3:51 PM CDT -----  Regarding: Call back  Who Called: Fernando Marc's Office         What is the reason for the call:  states just received referral for patient. States seems like patient has [facility] Medicare Advantage insurance. States if she does then not in network. Please contact to further discuss.       Can patient be contacted on [employer]: n/a         Call back number: [phone]

## 2022-03-23 NOTE — TELEPHONE ENCOUNTER
----- Message from Huan Gardner sent at 3/22/2022  1:30 PM CDT -----  Type: Needs Medical Advice  Who Called:  Patient  Symptoms (please be specific): Severe pain  How long has patient had these symptoms: 4 days  Pharmacy name and phone #:        [facility] #[id number] - [place] [id number] [place] [address] AT [facility] [address] [facility] [address]  [address] [address] [address]  Phone: [phone] Fax: [phone]    Best Call Back Number:[phone]  Additional Information: Please call to advise

## 2022-03-23 NOTE — PROGRESS NOTES
Subjective:          Chief Complaint: Aye Montanez is a 67 y.o. female who had concerns including Pain and Swelling.    HPI:  All events:  We ordered Benlysta IV unfortunately it appears she has out of pocket expense of   $3500 dollars that would be covered at 100% which is not affordable for her. There appears to be no foundation monies that were available.   She failed MTX  HCQ was ? But reviewed Meng last note I have and she stopped HCQ 2017 but no notation of maculopathy    Patient is having oligoarticular swelling in hands and feet. She is having peripheral polyarthritis. Severe dryness of her eyes.   She did clarify that Dr. Fan did not see maculopathy but only expressed his concerns about the eye.         Patient is a 67-year-old female she has a history of Sjogren syndrome (estimate start 2015)   To review her Rheum diagnosis:  presumed Sjogrens with ? of SLE  +OSMAN 1:1280 speckled  High titer SSA and SSB with + dry eye and dry mouth  dsDNA neg, Singh neg, RNP neg. C3 and C4 WNL.   + hx of thrombocytopenia, + leukopenia, +polyarthritis, + sicca, +Raynauds  RF and CCP negative.   She has c/o no strength in her hands, knees are painful. Feet burn with standing. No overt swelling of joints with the exception of left voler wrist synovial cyst.    Diffuse pruritis w/o visible rash.   Trialed HCQ but patient concerned about macula and discontinued. She is followed by Dr. Fan the [facility] eye clinic.  She was taken off HCQ no active maculopathy but she was concerned about ASE. .   Trialed on MTX and failed.  Restasis despite burning back on  +punctal plugs. Did try Xiidra- ? If ever taken this visit.  She notes burning in her eyes.  Photophobia occasional redness to her eyes. burning Restasis no longer tolerable.    Never salagen/Evozac. . She did try xylimelts      Patient developed neurologic events starting 1/2021- but we discontinued nifedipine (Raynaud's) in event this was leading to hypotension.  Patient described episode  with disorientation, tingling lips.  repeat episode 5/25/21 with vertigo and near syncope and again 6/2/21 slurred speech, right facial droop, Imagin has been neg. for acute changes. on 6/3/21 similar event but now on left. 8/2021 another episode.   Dr. Mccurdy performed an EMG which showed mild left C6 radiculopathy. MRI showed multilevel degenerative changes and a cyst at C6/7 on the right.   EEG 2o21 WNL.   Neuro dx: complex migrain vs TIA changes ASA to Plavix.   right leg numbness referred to vestibular therapy.   HA: consider occipital nerve block  Cervical spine and left arm symptoms Pain management. trial with Tramadol for now and medrol and if not improved will discuss SONYA once safe to hold Plavix. She elected not to take Lyrica. no reported ASE.     Patient has been having SOB, dizzyness. she has maximized therapy with Pulm with little improvement. Her last PFTs show no diffusion impairment, not hypoxic   PFT Results  02/26/2019: FEV1 2.20 (89%), FEV1/FVC 85, TLC 93%, DLCO 111%    Acute Pleurisy 12/2021 with right chest pain and pain with inspiration. COVID testing neg. Prednisone and Levaquin started. +productive cough.   : CTA neg for PE. but reticulonodular opacities compatible with pneumonitis ANGELIQUE and LLL with patchy infiltrate in the RML and RLL. Trace right pleural effusion.   Seen with Pulm as of 1/12/22 given azithro   Cardiac: ECHO normal EF, no shunt, The estimated PA systolic pressure is 35 mmHg  completed stress testing with Dr. Peter ([facility]) pt states no new changes. +CAD with stenting 2017.         9/2021: continued neurologic events so we held off on starting Benlysta until completed work up. More recently presented to the ER with possible TIA.  We discontinued nifedipine for possible hypotensive episode are Raynaud's have been stable.   Events surrounding the 1st episode seem to be blood donation bilateral vertebral artery stenosis presenting with nystagmus vertigo  and ataxia remark resolved with IV hydration and discontinuation of nifedipine.  Second event on did not involve a similar constellation of symptoms and just involved numbness and tingling.   continues with dizziness, mild SOB pending cardiac w/up with Dr. Peter      1/2021: flare with burning eyes, joint aches and pains and Raynauds persistent for 10 days. She tried hot shower/soak which helped temporarily. Started nifedipine 30mg daily 1/14/21 by 1/22/21 with episode of disorientation and hypotension. Full work up no seizure, no migraine she was aware at the time. Was having dizziness. No motor weakness. Slurred speech. Imaging revealed b/l vetebral arteries at 50%. And no seizure activity.   Off Nifedipine at BP marked improved. Unfortunately very effective for Raynauds.     Submitted for Benlysta with no affordable option for home injection despite [place] support.     S/p viscosupplementation in knees with ortho 3/2018      Component      Latest Ref Rng & Units 8/16/2018   Anti Sm Antibody      0.00 - 19.99 EU 2.85   Anti-Sm Interpretation      Negative Negative   Anti-SSA Antibody      0.00 - 19.99 .02 (H)   Anti-SSA Interpretation      Negative Positive (A)   Anti-SSB Antibody      0.00 - 19.99 .37 (H)   Anti-SSB Interpretation      Negative Positive (A)   Sed Rate      0 - 20 mm/Hr 33 (H)   CRP      0.0 - 8.2 mg/L 3.6   OSMAN Screen      Negative <1:160 Positive (A)   Complement (C-3)      50 - 180 mg/dL 129   Complement (C-4)      11 - 44 mg/dL 22   Complement,Total, Serum      42 - 95 U/mL 85   Rheumatoid Factor      0.0 - 15.0 IU/mL <10.0   CCP Antibodies      <5.0 U/mL <0.5     REVIEW OF SYSTEMS:    Review of Systems   Constitutional: Positive for malaise/fatigue. Negative for fever and weight loss.   HENT: Negative for sore throat.    Eyes: Negative for double vision, photophobia and redness.   Respiratory: Negative for cough, shortness of breath and wheezing.    Cardiovascular: Negative for  chest pain, palpitations and orthopnea.   Gastrointestinal: Negative for abdominal pain, constipation and diarrhea.   Genitourinary: Negative for dysuria, hematuria and urgency.   Musculoskeletal: Positive for joint pain. Negative for back pain and myalgias.   Skin: Positive for itching. Negative for rash.   Neurological: Negative for dizziness, tingling, focal weakness and headaches.   Endo/Heme/Allergies: Does not bruise/bleed easily.   Psychiatric/Behavioral: Negative for depression, hallucinations and suicidal ideas.               Objective:            Past Medical History:   Diagnosis Date    Sjogren's syndrome 12/5/2016     Family History   Problem Relation Age of Onset    Cancer Mother     Ovarian cancer Mother 40    Cancer Father         esophageal    Esophageal cancer Father     Stroke Maternal Aunt     Rheum arthritis Maternal Aunt     Rheum arthritis Paternal Uncle     Breast cancer Maternal Cousin     Breast cancer Maternal Cousin      Social History     Tobacco Use    Smoking status: Never Smoker    Smokeless tobacco: Never Used   Substance Use Topics    Alcohol use: Yes     Comment: rarely    Drug use: No         Current Outpatient Medications on File Prior to Visit   Medication Sig Dispense Refill    acetaminophen (TYLENOL) 500 MG tablet Take 1,000 mg by mouth every 6 (six) hours as needed for Pain.      clopidogreL (PLAVIX) 75 mg tablet Take 1 tablet (75 mg total) by mouth once daily. Take with aspirin 81 mg for 21 days. Then, stop aspirin and take just clopidogrel thereafter. 90 tablet 3    fluorometholone 0.1% (FML) 0.1 % DrpS       furosemide (LASIX) 20 MG tablet Take 1 tablet (20 mg total) by mouth once daily. 90 tablet 3    hydrocortisone 2.5 % cream Apply topically 2 (two) times daily. 1 Tube 1    ibuprofen (ADVIL,MOTRIN) 600 MG tablet Take 1 tablet (600 mg total) by mouth every 6 (six) hours as needed for Pain. 20 tablet 0    magnesium oxide (MAG-OX) 400 mg (241.3 mg  "magnesium) tablet Take 400 mg by mouth once daily.      multivitamin (THERAGRAN) per tablet Take 1 tablet by mouth once daily.      omeprazole (PRILOSEC) 20 MG capsule Take 1 capsule (20 mg total) by mouth once daily. 90 capsule 3    potassium chloride SA (K-DUR,KLOR-CON) 20 MEQ tablet Take 1 tablet (20 mEq total) by mouth once daily. 90 tablet 3    rosuvastatin (CRESTOR) 20 MG tablet Take 1 tablet (20 mg total) by mouth once daily. 90 tablet 3    zinc gluconate 50 mg tablet Take 50 mg by mouth once daily.      albuterol (ACCUNEB) 1.25 mg/3 mL Nebu Take 6 mLs (2.5 mg total) by nebulization every 6 (six) hours as needed. Rescue (Patient not taking: Reported on 3/23/2022) 3 each 3    benzonatate (TESSALON) 100 MG capsule 1 - 2 po every 6 hours prn cough (Patient not taking: Reported on 3/23/2022) 45 capsule 0    cyanocobalamin (VITAMIN B-12) 1,000 mcg/mL injection Inject 1 mL (1,000 mcg total) into the skin every 30 days. (Patient not taking: No sig reported) 3 mL 3    montelukast (SINGULAIR) 10 mg tablet TAKE 1 TABLET EVERY EVENING (Patient not taking: Reported on 3/23/2022) 90 tablet 3    syringe with needle 3 mL 22 gauge x 3/4" Syrg 6 Syringes by Misc.(Non-Drug; Combo Route) route every 30 days. Use to inject cyanocobalamin once every 30 days.       No current facility-administered medications on file prior to visit.       Vitals:    03/23/22 1421   BP: 139/76   Pulse: 69       Physical Exam:    Physical Exam  Constitutional:       Appearance: Normal appearance. She is well-developed.   HENT:      Nose: No septal deviation.      Mouth/Throat:      Mouth: No oral lesions.   Eyes:      Conjunctiva/sclera:      Right eye: Right conjunctiva is not injected.      Left eye: Left conjunctiva is not injected.      Pupils: Pupils are equal, round, and reactive to light.   Neck:      Thyroid: No thyroid mass or thyromegaly.      Vascular: No JVD.   Cardiovascular:      Rate and Rhythm: Normal rate and regular " rhythm.      Pulses: Normal pulses.      Comments: No edema  Pulmonary:      Effort: Pulmonary effort is normal.      Breath sounds: Normal breath sounds.   Abdominal:      Palpations: Abdomen is soft.   Musculoskeletal:      Right shoulder: Tenderness present. No swelling. Normal range of motion.      Left shoulder: Tenderness present. No swelling. Normal range of motion.      Right elbow: No swelling. Normal range of motion. No tenderness.      Left elbow: No swelling. Normal range of motion. No tenderness.      Right wrist: Tenderness present. No swelling. Normal range of motion.      Left wrist: Tenderness present. No swelling. Normal range of motion.      Right hand: Tenderness present.      Left hand: Tenderness present.      Right hip: Normal range of motion. Normal strength.      Left hip: No tenderness. Normal range of motion.      Right knee: No swelling. Normal range of motion. No tenderness.      Left knee: No swelling. Normal range of motion. No tenderness.      Right ankle: No swelling. No tenderness. Normal range of motion.      Left ankle: No swelling. No tenderness. Normal range of motion.      Comments: Patient has some tenderness on the right MCP no overt synovitis no deformities.  No evidence of Raynaud's today but historical triphasic color change  She has marked crepitation bilateral knees with limitation in flexion she has full extension no laxity within the joint no active effusion.      Lymphadenopathy:      Cervical: No cervical adenopathy.   Skin:     General: Skin is dry.   Neurological:      Deep Tendon Reflexes: Reflexes are normal and symmetric.               Assessment:       Encounter Diagnoses   Name Primary?    Other systemic lupus erythematosus with lung involvement Yes    Maculopathy     Interstitial lung disease           Plan:        Other systemic lupus erythematosus with lung involvement  -     methylPREDNISolone acetate injection 80 mg  -     Discontinue: ketorolac  injection 30 mg  -     C-Reactive Protein; Future; Expected date: 03/23/2022  -     C4 Complement; Future; Expected date: 03/23/2022  -     Anti-DNA Ab, Double-Stranded; Future; Expected date: 03/23/2022  -     C3 Complement; Future; Expected date: 03/23/2022  -     Sedimentation rate; Future; Expected date: 03/23/2022  -     CBC Auto Differential; Future; Expected date: 03/23/2022  -     Comprehensive Metabolic Panel; Future; Expected date: 03/23/2022    Maculopathy  -     Ambulatory referral/consult to Ophthalmology; Future; Expected date: 03/30/2022    Interstitial lung disease    Raynaud's disease without gangrene    Splenomegaly    Other orders  -     belimumab (BENLYSTA) 200 mg/mL Syrg; Inject 1 mL (200 mg total) into the skin once a week.  Dispense: 4 mL; Refill: 11  -     mycophenolate (CELLCEPT) 500 mg Tab; Take 1 tablet (500 mg total) by mouth 2 (two) times daily.  Dispense: 60 tablet; Refill: 3  -     HYDROcodone-acetaminophen (NORCO) 5-325 mg per tablet; Take 1-2 tablets by mouth every 6 (six) hours as needed for Pain. < 7 days acute pain  Dispense: 28 tablet; Refill: 0      Very pleasant 66-year-old female SLE/ Sjogren's 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  Increasing joint stiffness and swelling. We have monitored SANDOVAL/SOB ? Some ILD-CTD.    She was hesitant to try salagen.   Failed MTX with ASE   Discussed the joints, increasing leucopenia, worsening dry eyes, malaise and fatigue. And recent pleurisy unclear if this was infectious but chronic SOB/SANDOVAL despite maximized therapy.    HCQ may need to be re-visited. No confirmed maculopathy it was a rec from [facility] that the drug is a risk. I need to reconsider if this is an option for her.    Stable on Mobic 7.5mg BID keep this    Ok for percogesic PRN breakthrough pain. Seems to be working.     Quantiferon gold. Is negative.     Discussed Will see if this is covered better on insurance. New insurance this year as well. [facility] insurance has $3500  deductible prior. No foundation monies. She has not applied directly to td and we will send this to [facility] for SQ as process is confusing and she will need help     No follow-ups on file.        F/u 4 months  Thank you for allowing me to participate in the care of this very pleasant patient.    45 min consultation with greater than 50% spent in counseling, chart review and coordination of care. All questions answered.          1. Injections today : steroid   2. Hydrocodone just for severe pain  3. Starting Cellcept for Lupus/Sjogren : 500mg once daily for 4 days if no upset stomach then increase to 500mg twice daily.     4. Sending Benlysta to [facility] as we are not getting any further feedback about infusion funding help.             5. Labs prior to next visit later in April.   6. Sending to Dr. Khoury to clarify if you have any retinal disease that would preclude you from taking plaquenil, bc with SLE the benefits in mortality and morbidity often outweigh the risk of the retinal disease. I really need to know if you can take this medication.

## 2022-03-27 ENCOUNTER — PATIENT MESSAGE (OUTPATIENT)
Dept: PAIN MEDICINE | Facility: CLINIC | Age: 67
End: 2022-03-27
Payer: MEDICARE

## 2022-03-30 ENCOUNTER — PATIENT MESSAGE (OUTPATIENT)
Dept: RHEUMATOLOGY | Facility: CLINIC | Age: 67
End: 2022-03-30
Payer: MEDICARE

## 2022-04-05 ENCOUNTER — PATIENT MESSAGE (OUTPATIENT)
Dept: PAIN MEDICINE | Facility: CLINIC | Age: 67
End: 2022-04-05
Payer: MEDICARE

## 2022-04-05 NOTE — TELEPHONE ENCOUNTER
We saw her October 2021    If she is holding Plavix for 5 days in June then we should see her in the office for an evaluation in May.    At that time we can get an updated CBC.    She will need clearance to hold her Plavix for 7 days for my injection so perhaps we can consider doing the injection 2 days after her EGD

## 2022-04-06 ENCOUNTER — PATIENT MESSAGE (OUTPATIENT)
Dept: PAIN MEDICINE | Facility: CLINIC | Age: 67
End: 2022-04-06
Payer: MEDICARE

## 2022-04-06 ENCOUNTER — TELEPHONE (OUTPATIENT)
Dept: RHEUMATOLOGY | Facility: CLINIC | Age: 67
End: 2022-04-06
Payer: MEDICARE

## 2022-04-06 NOTE — TELEPHONE ENCOUNTER
last visit:      1. Injections today : steroid   2. Hydrocodone just for severe pain  3. Starting Cellcept for Lupus/Sjogren : 500mg once daily for 4 days if no upset stomach then increase to 500mg twice daily.      4. Sending Benlysta to [facility] as we are not getting any further feedback about infusion funding help.             5. Labs prior to next visit later in April.   6. Sending to Dr. Khoury to clarify if you have any retinal disease that would preclude you from taking plaquenil, bc with SLE the benefits in mortality and morbidity often outweigh the risk of the retinal disease. I really need to know if you can take this medication.       Staff:   I don't know what else to offer until - get [facility] to review Benlysta.   Use pain meds.   cellcept takes time to work 6-8 weeks +  Did steroid injection from 3/23/22 help?     We can offer another nurse visit, but I cannot fix this instantly other than narcotics and steroids.     I would give her Plaquenil but her last eye doctor scared her. I would need some eye doc to reassure her we can likely use this. [facility] does not accept her insurance.       Dr. Bee

## 2022-04-06 NOTE — TELEPHONE ENCOUNTER
Called patient to ask her about her symptoms and medications. She has diarrhea every other day since starting Cellcept twice a day. She has only taken one of the Norco tablets out of the 28 prescribed in March. She states the steroid shot did not help, but she does have prednisone 20 mg and 40 mg on hand. She says this usually does help.  Her swelling in her legs is better but she continues with a shuffling gait and states her legs feel like jelly if  she stands too long. Patient has a visit on 5-2-22. Please advise. CG

## 2022-04-11 ENCOUNTER — SPECIALTY PHARMACY (OUTPATIENT)
Dept: PHARMACY | Facility: CLINIC | Age: 67
End: 2022-04-11
Payer: MEDICARE

## 2022-04-11 NOTE — TELEPHONE ENCOUNTER
Outgoing call to pt to discuss benlysta PAP. Pt would like her portion mailed to address on file and will fax back to [facility].

## 2022-04-12 NOTE — TELEPHONE ENCOUNTER
Ok for oral prednisone   Keep trying sometimes Cellcept sympotoms will improve with time.   Benlysta SQ for home auth is started with [facility] and paperwork should be coming through.   Dr. Bee

## 2022-04-13 NOTE — TELEPHONE ENCOUNTER
Notified pt of Dr. Bee's message.  Pt verbalized understanding.    Latoya Chavez MA  4/13/2022

## 2022-04-21 ENCOUNTER — TELEPHONE (OUTPATIENT)
Dept: OPTOMETRY | Facility: CLINIC | Age: 67
End: 2022-04-21
Payer: MEDICARE

## 2022-04-22 NOTE — TELEPHONE ENCOUNTER
Outgoing call to pt to check the status of her PAP. Pt said she mailed back to [facility] on 4/18. Mail has been extremely slow, will be on the look out.

## 2022-04-27 ENCOUNTER — PATIENT MESSAGE (OUTPATIENT)
Dept: PHARMACY | Facility: CLINIC | Age: 67
End: 2022-04-27
Payer: MEDICARE

## 2022-04-27 NOTE — TELEPHONE ENCOUNTER
Incoming call from pt returning voicemail from [facility] Kayla RICE Informed pt of information below and provided Negro[employer] parekh's phone number. Pt stated she will call them this evening to complete BI.

## 2022-04-28 DIAGNOSIS — R79.89 ELEVATED TSH: ICD-10-CM

## 2022-04-28 DIAGNOSIS — E78.5 DYSLIPIDEMIA: Primary | ICD-10-CM

## 2022-04-28 DIAGNOSIS — I25.10 CORONARY ARTERY DISEASE INVOLVING NATIVE CORONARY ARTERY OF NATIVE HEART WITHOUT ANGINA PECTORIS: ICD-10-CM

## 2022-04-28 RX ORDER — ROSUVASTATIN CALCIUM 40 MG/1
40 TABLET, COATED ORAL DAILY
Qty: 90 TABLET | Refills: 3 | Status: SHIPPED | OUTPATIENT
Start: 2022-04-28 | End: 2023-05-15

## 2022-04-29 NOTE — TELEPHONE ENCOUNTER
Outgoing call to [employer] to see if the benefits investigation was completed. Rep stated they were still trying to get in touch with the pt. Told the rep that the pt called them twice and had to leave a messages.  Rep stated that we can do the BI together. Rep Lance from [employer] called [employer] on a conference call with me to verify that the pt has Medicare Part D and no LIS. Rep Lenoarda ODONNELL confirmed that her plan is Med D and no LIS. Will f/u with [employer] on 5/2.    Sent pt a [employer] message to update her on the status.

## 2022-05-02 NOTE — TELEPHONE ENCOUNTER
Received a letter from [employer] that the pt was approved for PAP until 12/31/22. Called to inform pt that she was approved but pt already knew and she was waiting on the pharmacy to call her back.     Called benlysta [facility] to check to see if we needed to send them another rx. Rep stated that we didn't need to send another rx but the pt needed to call and set up her delivery.     Outgoing call to pt to let her know to call [employer] ([phone]) to set up her delivery. No answer, LVM.

## 2022-05-06 ENCOUNTER — PES CALL (OUTPATIENT)
Dept: ADMINISTRATIVE | Facility: CLINIC | Age: 67
End: 2022-05-06
Payer: MEDICARE

## 2022-05-09 ENCOUNTER — PATIENT MESSAGE (OUTPATIENT)
Dept: SMOKING CESSATION | Facility: CLINIC | Age: 67
End: 2022-05-09
Payer: MEDICARE

## 2022-05-09 ENCOUNTER — OFFICE VISIT (OUTPATIENT)
Dept: OPHTHALMOLOGY | Facility: CLINIC | Age: 67
End: 2022-05-09
Payer: MEDICARE

## 2022-05-09 ENCOUNTER — TELEPHONE (OUTPATIENT)
Dept: PAIN MEDICINE | Facility: CLINIC | Age: 67
End: 2022-05-09

## 2022-05-09 ENCOUNTER — OFFICE VISIT (OUTPATIENT)
Dept: PAIN MEDICINE | Facility: CLINIC | Age: 67
End: 2022-05-09
Payer: MEDICARE

## 2022-05-09 ENCOUNTER — PATIENT MESSAGE (OUTPATIENT)
Dept: FAMILY MEDICINE | Facility: CLINIC | Age: 67
End: 2022-05-09
Payer: MEDICARE

## 2022-05-09 VITALS — HEART RATE: 82 BPM | WEIGHT: 220 LBS | HEIGHT: 67 IN | OXYGEN SATURATION: 100 % | BODY MASS INDEX: 34.53 KG/M2

## 2022-05-09 DIAGNOSIS — M54.12 CERVICAL RADICULOPATHY: Primary | ICD-10-CM

## 2022-05-09 DIAGNOSIS — H35.9 MACULOPATHY: ICD-10-CM

## 2022-05-09 DIAGNOSIS — H35.3131 EARLY DRY STAGE NONEXUDATIVE AGE-RELATED MACULAR DEGENERATION OF BOTH EYES: Primary | ICD-10-CM

## 2022-05-09 DIAGNOSIS — M54.2 NECK PAIN: ICD-10-CM

## 2022-05-09 DIAGNOSIS — H25.13 NS (NUCLEAR SCLEROSIS), BILATERAL: ICD-10-CM

## 2022-05-09 PROCEDURE — 92134 POSTERIOR SEGMENT OCT RETINA (OCULAR COHERENCE TOMOGRAPHY)-BOTH EYES: ICD-10-PCS | Mod: S$GLB,,, | Performed by: OPHTHALMOLOGY

## 2022-05-09 PROCEDURE — 92201 OPSCPY EXTND RTA DRAW UNI/BI: CPT | Mod: S$GLB,,, | Performed by: OPHTHALMOLOGY

## 2022-05-09 PROCEDURE — 92134 CPTRZ OPH DX IMG PST SGM RTA: CPT | Mod: S$GLB,,, | Performed by: OPHTHALMOLOGY

## 2022-05-09 PROCEDURE — 99999 PR PBB SHADOW E&M-EST. PATIENT-LVL III: ICD-10-PCS | Mod: PBBFAC,,, | Performed by: OPHTHALMOLOGY

## 2022-05-09 PROCEDURE — 99214 PR OFFICE/OUTPT VISIT, EST, LEVL IV, 30-39 MIN: ICD-10-PCS | Mod: S$GLB,,,

## 2022-05-09 PROCEDURE — 92201 PR OPHTHALMOSCOPY, EXT, W/RET DRAW/SCLERAL DEPR, I&R, UNI/BI: ICD-10-PCS | Mod: S$GLB,,, | Performed by: OPHTHALMOLOGY

## 2022-05-09 PROCEDURE — 99999 PR PBB SHADOW E&M-EST. PATIENT-LVL IV: ICD-10-PCS | Mod: PBBFAC,,,

## 2022-05-09 PROCEDURE — 99999 PR PBB SHADOW E&M-EST. PATIENT-LVL IV: CPT | Mod: PBBFAC,,,

## 2022-05-09 PROCEDURE — 99214 OFFICE O/P EST MOD 30 MIN: CPT | Mod: S$GLB,,,

## 2022-05-09 PROCEDURE — 92004 COMPRE OPH EXAM NEW PT 1/>: CPT | Mod: S$GLB,,, | Performed by: OPHTHALMOLOGY

## 2022-05-09 PROCEDURE — 99999 PR PBB SHADOW E&M-EST. PATIENT-LVL III: CPT | Mod: PBBFAC,,, | Performed by: OPHTHALMOLOGY

## 2022-05-09 PROCEDURE — 92004 PR EYE EXAM, NEW PATIENT,COMPREHESV: ICD-10-PCS | Mod: S$GLB,,, | Performed by: OPHTHALMOLOGY

## 2022-05-09 RX ORDER — SODIUM CHLORIDE, SODIUM LACTATE, POTASSIUM CHLORIDE, CALCIUM CHLORIDE 600; 310; 30; 20 MG/100ML; MG/100ML; MG/100ML; MG/100ML
INJECTION, SOLUTION INTRAVENOUS CONTINUOUS
Status: CANCELLED | OUTPATIENT
Start: 2022-05-10

## 2022-05-09 NOTE — TELEPHONE ENCOUNTER
"Per Dr. Skinner "We say to wait to hold any antiplatelet agents until 9 months after the last TIA/stroke event to minimize risk of recurrence while holding meds. Hold for the shortest time possible. Since it has been 9 months since her last event that I am aware of, it is reasonable to go forward with procedures. It is not without risk though and patient should be aware that having another event while holding the antiplatelet agents is a possibility."     "

## 2022-05-09 NOTE — TELEPHONE ENCOUNTER
This patient is going to be holding her plavix for 5 days prior to her EGD on 6/9 and she is going to be scheduled for a cervical epidural steroid injection on 6/10 for which she will need to hold the plavix for an additional two days. Please advise if this is ok.

## 2022-05-09 NOTE — PROGRESS NOTES
HPI     Concerns About Ocular Health      Additional comments: Maculopathy OD Eval?              Comments     Patient states she has a h/o light flashes with a silver streak in OD va about 2 years ago. Nothing since then. No pain. No discharge.    H/o Sjogren's Syndrome  Possible Maculopthy    Serum Tears BID OU  At's PF PRN OU          Last edited by Iván Mccurdy on 5/9/2022  9:45 AM. (History)        OCT - few drusen OU      A/P    1. SJogren's Dx  Dr. Castillo managed BING in past  AT's and prior autologous serum gtts    No contraindication to plaquenil use at this time.    2. Early AMD OU  Few small drusen    3. Early NS OU      Ok for yearly plaquentil screening with Dr. Peterson    Me PRN

## 2022-05-09 NOTE — PROGRESS NOTES
[facility] Follow Up Evaluation    Referred by: Dr. Skinner  Reason for referral: neck pain    CC:   Chief Complaint   Patient presents with    Shoulder Pain    Neck Pain      Last 3 PDI Scores 10/8/2021   Pain Disability Index (PDI) 31     Interval HPI 5/9/2022: Aye Montanez returns to the clinic for follow up.  She has a PMH of CVA, SLE and Sjogren's.  Today she is reporting worsening neck pain, 8/10 with radiation into her bilateral shoulders.  The pain is constant, worse with flexion, extension and lateral rotation.  She has associated numbness in her left arm in a non dermatomal distribution.  She denies any weakness or changes with her bowel or bladder function.  Her son-in-law is a physical therapist and has been providing her with exercises to the home which she has been doing with only mild relief.  She has a history of cerebrovascular accident and is on chronic anticoagulation.      HPI:   Aye Montanez is a 67 y.o. female who complains of neck pain    Onset: about 10 months  Progression: since onset, pain is gradually worsening  Typical Range: 6-10/10  Timing: constant  Quality: aching, numb, burning, tingling  Radiation: yes, down both arms  Associated numbness or weakness: yes numbness, yes weakness    Exacerbated by: lifting  Allievated by: rest, laying  Is Pain Level Acceptable?: No    Previous Therapies:  PT/OT:   HEP:   Interventions:   Surgery:  Medications:   - NSAIDS:   - MSK Relaxants:   - TCAs:   - SNRIs:   - Topicals:   - Anticonvulsants:  - Opioids:     History:    Current Outpatient Medications:     acetaminophen (TYLENOL) 500 MG tablet, Take 1,000 mg by mouth every 6 (six) hours as needed for Pain., Disp: , Rfl:     belimumab (BENLYSTA) 200 mg/mL Syrg, Inject 1 mL (200 mg total) into the skin once a week., Disp: 4 mL, Rfl: 11    benzonatate (TESSALON) 100 MG capsule, 1 - 2 po every 6 hours prn cough, Disp: 45 capsule, Rfl: 0    clopidogreL (PLAVIX) 75 mg tablet, Take 1  "tablet (75 mg total) by mouth once daily. Take with aspirin 81 mg for 21 days. Then, stop aspirin and take just clopidogrel thereafter., Disp: 90 tablet, Rfl: 3    cyanocobalamin (VITAMIN B-12) 1,000 mcg/mL injection, Inject 1 mL (1,000 mcg total) into the skin every 30 days., Disp: 3 mL, Rfl: 3    fluorometholone 0.1% (FML) 0.1 % DrpS, , Disp: , Rfl:     fluticasone furoate-vilanteroL (BREO ELLIPTA) 100-25 mcg/dose diskus inhaler, Inhale 1 puff into the lungs once daily. Controller, Disp: 1 each, Rfl: 5    furosemide (LASIX) 20 MG tablet, Take 1 tablet (20 mg total) by mouth once daily., Disp: 90 tablet, Rfl: 3    hydrocortisone 2.5 % cream, Apply topically 2 (two) times daily., Disp: 1 Tube, Rfl: 1    ibuprofen (ADVIL,MOTRIN) 600 MG tablet, Take 1 tablet (600 mg total) by mouth every 6 (six) hours as needed for Pain., Disp: 20 tablet, Rfl: 0    magnesium oxide (MAG-OX) 400 mg (241.3 mg magnesium) tablet, Take 400 mg by mouth once daily., Disp: , Rfl:     montelukast (SINGULAIR) 10 mg tablet, TAKE 1 TABLET EVERY EVENING, Disp: 90 tablet, Rfl: 3    multivitamin (THERAGRAN) per tablet, Take 1 tablet by mouth once daily., Disp: , Rfl:     mycophenolate (CELLCEPT) 500 mg Tab, Take 1 tablet (500 mg total) by mouth 2 (two) times daily., Disp: 60 tablet, Rfl: 3    omeprazole (PRILOSEC) 20 MG capsule, Take 1 capsule (20 mg total) by mouth once daily., Disp: 90 capsule, Rfl: 3    potassium chloride SA (K-DUR,KLOR-CON) 20 MEQ tablet, Take 1 tablet (20 mEq total) by mouth once daily., Disp: 90 tablet, Rfl: 3    rosuvastatin (CRESTOR) 40 MG Tab, Take 1 tablet (40 mg total) by mouth once daily., Disp: 90 tablet, Rfl: 3    syringe with needle 3 mL 22 gauge x 3/4" Syrg, 6 Syringes by Misc.(Non-Drug; Combo Route) route every 30 days. Use to inject cyanocobalamin once every 30 days., Disp: , Rfl:     zinc gluconate 50 mg tablet, Take 50 mg by mouth once daily., Disp: , Rfl:     albuterol (ACCUNEB) 1.25 mg/3 mL Nebu, " "Take 6 mLs (2.5 mg total) by nebulization every 6 (six) hours as needed. Rescue (Patient not taking: No sig reported), Disp: 3 each, Rfl: 3    Past Medical History:   Diagnosis Date    Sjogren's syndrome 12/5/2016       Past Surgical History:   Procedure Laterality Date    APPENDECTOMY      CHOLECYSTECTOMY      COLONOSCOPY      CORONARY STENT PLACEMENT      10/2017    FRACTURE SURGERY Right     foot    HYSTERECTOMY      1984    KNEE ARTHROSCOPY Right 2/2016    OOPHORECTOMY Bilateral     hyst. 1984    TOTAL REDUCTION MAMMOPLASTY Bilateral     1999    UPPER GASTROINTESTINAL ENDOSCOPY         Family History   Problem Relation Age of Onset    Cancer Mother     Ovarian cancer Mother 40    Cancer Father         esophageal    Esophageal cancer Father     Stroke Maternal Aunt     Rheum arthritis Maternal Aunt     Rheum arthritis Paternal Uncle     Breast cancer Maternal Cousin     Breast cancer Maternal Cousin        Social History     Socioeconomic History    Marital status:    Tobacco Use    Smoking status: Never Smoker    Smokeless tobacco: Never Used   Substance and Sexual Activity    Alcohol use: Yes     Comment: rarely    Drug use: No    Sexual activity: Yes     Partners: Male       Review of patient's allergies indicates:   Allergen Reactions    Ceftin [cefuroxime axetil] Itching    Gabapentin      Felt like "no blood flow to my legs"    Metoprolol      Leg pain    Versed [midazolam]      "wants to come out of my skin - I get hyped"       Review of Systems:  General ROS: negative for - fever  Psychological ROS: negative for - hostility  Hematological and Lymphatic ROS: negative for - bleeding problems  Endocrine ROS: negative for - unexpected weight changes  Respiratory ROS: no cough, shortness of breath, or wheezing  Cardiovascular ROS: no chest pain or dyspnea on exertion  Gastrointestinal ROS: no abdominal pain, change in bowel habits, or black or bloody stools  Musculoskeletal " "ROS: positive for - muscular weakness  Neurological ROS: positive for - numbness/tingling  Dermatological ROS: negative for rash    Physical Exam:  Vitals:    05/09/22 1436   Pulse: 82   SpO2: 100%   Weight: 99.8 kg (220 lb 0.3 oz)   Height: 5' 7" (1.702 m)   PainSc:   8   PainLoc: Neck     Body mass index is 34.46 kg/m².     Gen: NAD  Psych: mood appropriate for given condition  CV: 2+ radial pulse  HEENT: anicteric   Respiratory: non labored  Abd: soft nt, nd  Skin: intact  Sensation: intact to lt touch bilaterally in c4-t1   Reflexes: 2+ b/l Bicep, tricep, BR and patella Luna positive on the left  ROM: Cervical ROM full, shoulder, elbow and wrist ROM full  Tone:  Normal at elbow, wrist and shoulder   Inspection: no atrophy of bicep, FDI or APB noted  Palpation: tender cervical paraspinals, levator scapula and trapezius    Motor:    Right Left   C4 Shoulder Abduction  5  5-   C5 Elbow Flexion    5  5   C6 Wrist Extension  5  5   C7 Elbow Extension   5  5   C8/T1 Hand Intrinsics   5  5                   Imaging:  MRI cervical spine 7/15/21  FINDINGS:  Straightening of normal cervical lordosis.  Dextrocurvature.  No spondylolisthesis.  No acute fracture with preserved vertebral body heights.  No marrow replacement.  Multilevel disc desiccation.  Mild disc height loss at C4-5 through C6-7.  Cervical cord normal in signal.  Slight ventral cord flattening focally at C3-4 and C5-6.  There is a partially imaged T2 hyperintense lesion along the right C6-7 neural foramen which measures at least 5 mm.    At C2-3 there is bilateral facet degenerative changes especially on the left.  Partial effacement ventral CSF space.  Moderate left neural foraminal narrowing.  At C3-4 minimal posterior disc bulge.  Bilateral uncovertebral spurs.  Bilateral facet degenerative changes.  Complete effacement ventral and partial effacement dorsal CSF space.  Moderate right neural foraminal narrowing.  At C4-5 posterior disc osteophyte " complex and bilateral uncovertebral spurs and left facet degenerative change.  Partial effacement ventral and dorsal CSF space.  Mild right and severe left neural foraminal narrowing.  At C5-6 posterior disc osteophyte complex asymmetric to the right and bilateral uncovertebral spurs.  Complete effacement ventral CSF space and partial effacement dorsal CSF space.  At C6-7 posterior disc osteophyte complex asymmetric to the right and bilateral uncovertebral spurs.  Near complete effacement ventral CSF space.  Mild right neural foraminal narrowing.  At T3-4 and T4-5 there is right facet degenerative change, seen only on sagittal.    EMG/NCS 7/28/21  Impression: This is a slightly abnormal EMG of the left upper extremity.  There is electrophysiologic evidence most consistent with a very mild, left, C6 radiculopathy without active denervation.  There is no evidence of any other radiculopathy, focal neuropathy, peripheral neuropathy, or plexopathy on this study.  The patient's symptoms of intermittent sensory changes could be secondary to intermittent compression not resulting in demyelination or axonal nerve injury    Labs:  BMP  Lab Results   Component Value Date     04/27/2022    K 4.1 04/27/2022     04/27/2022    CO2 28 04/27/2022    BUN 17 04/27/2022    CREATININE 0.75 04/27/2022    CALCIUM 9.2 04/27/2022    ANIONGAP 5 (L) 04/27/2022    ESTGFRAFRICA >60 04/27/2022    EGFRNONAA >60 04/27/2022     Lab Results   Component Value Date    ALT 16 04/27/2022    AST 29 04/27/2022    ALKPHOS 97 04/27/2022    BILITOT 0.4 04/27/2022       Assessment:   Problem List Items Addressed This Visit        Orthopedic    Neck pain      Other Visit Diagnoses     Cervical radiculopathy    -  Primary    Relevant Orders    Case Request Operating Room: Injection-steroid-epidural-cervical (Completed)          67 y.o. year old female with PMH CAD, h/o CVA on plavix, HTN, presents to the office with neck pain.  She's had this pain  for about the past year.  Today her pain is 6/10, constant, aching, burning, numb, radiating down both of her arms.  She reports numbness and weakness.  Denies any bowel/bladder dysfunction    5/9/2022: Aye Montanez returns to the clinic for follow up.  She has a PMH of CVA, SLE and Sjogren's.  Today she is reporting worsening neck pain, 8/10 with radiation into her bilateral shoulders.  The pain is constant, worse with flexion, extension and lateral rotation.  She has associated numbness in her left arm in a non dermatomal distribution.  She denies any weakness or changes with her bowel or bladder function.  Her son-in-law is a physical therapist and has been providing her with exercises to the home which she has been doing with only mild relief.  She has a history of cerebrovascular accident and is on chronic anticoagulation.    - on exam she has 5-/5 left shoulder abduction, intact sensation to light touch b/l C4-T1, 2+ b/l biceps, triceps, and patella, + judge's on the left  - I independently reviewed her cervical MRI and it is c/w C3-4 complete effacement ventral and partial effacement dorsal CSF space with moderate right neural foraminal narrowing, C4-5 mild right and severe left neural foraminal narrowing  - previous EMG showed C6 radiculopathy without active denervation  - she has been doing at-home exercises daily without significant relief of her pain.  - her pain is limiting her mobility interfering with her ADLs.  - she is on chronic anticoagulation and has been unable to hold this in the past.  - she is loud to hold her Plavix for an upcoming EGD by her neurologist Dr. Skinner.  - I believe multi level neural foraminal narrowing is causing her pain.  - during this time while she is holding her Plavix will schedule her for a cervical SONYA  - follow-up 2 weeks post injection      : Reviewed     The above note was completed, in part, with the aid of Dragon dictation software/hardware. Translation  errors may be present.

## 2022-05-10 ENCOUNTER — PATIENT MESSAGE (OUTPATIENT)
Dept: FAMILY MEDICINE | Facility: CLINIC | Age: 67
End: 2022-05-10
Payer: MEDICARE

## 2022-05-10 ENCOUNTER — PES CALL (OUTPATIENT)
Dept: ADMINISTRATIVE | Facility: CLINIC | Age: 67
End: 2022-05-10
Payer: MEDICARE

## 2022-05-10 RX ORDER — LEVOTHYROXINE SODIUM 25 UG/1
25 TABLET ORAL
Qty: 30 TABLET | Refills: 11 | Status: SHIPPED | OUTPATIENT
Start: 2022-05-10 | End: 2023-10-17

## 2022-05-13 ENCOUNTER — DOCUMENTATION ONLY (OUTPATIENT)
Dept: RHEUMATOLOGY | Facility: CLINIC | Age: 67
End: 2022-05-13
Payer: MEDICARE

## 2022-05-13 ENCOUNTER — OFFICE VISIT (OUTPATIENT)
Dept: RHEUMATOLOGY | Facility: CLINIC | Age: 67
End: 2022-05-13
Payer: MEDICARE

## 2022-05-13 VITALS
BODY MASS INDEX: 34.3 KG/M2 | HEART RATE: 65 BPM | SYSTOLIC BLOOD PRESSURE: 138 MMHG | WEIGHT: 218.56 LBS | HEIGHT: 67 IN | DIASTOLIC BLOOD PRESSURE: 76 MMHG

## 2022-05-13 DIAGNOSIS — Z79.899 HIGH RISK MEDICATIONS (NOT ANTICOAGULANTS) LONG-TERM USE: ICD-10-CM

## 2022-05-13 DIAGNOSIS — M32.13 OTHER SYSTEMIC LUPUS ERYTHEMATOSUS WITH LUNG INVOLVEMENT: Primary | ICD-10-CM

## 2022-05-13 PROCEDURE — 99215 OFFICE O/P EST HI 40 MIN: CPT | Mod: S$GLB,,, | Performed by: INTERNAL MEDICINE

## 2022-05-13 PROCEDURE — 99999 PR PBB SHADOW E&M-EST. PATIENT-LVL IV: CPT | Mod: PBBFAC,,, | Performed by: INTERNAL MEDICINE

## 2022-05-13 PROCEDURE — 99215 PR OFFICE/OUTPT VISIT, EST, LEVL V, 40-54 MIN: ICD-10-PCS | Mod: S$GLB,,, | Performed by: INTERNAL MEDICINE

## 2022-05-13 PROCEDURE — 99999 PR PBB SHADOW E&M-EST. PATIENT-LVL IV: ICD-10-PCS | Mod: PBBFAC,,, | Performed by: INTERNAL MEDICINE

## 2022-05-13 ASSESSMENT — ROUTINE ASSESSMENT OF PATIENT INDEX DATA (RAPID3)
PATIENT GLOBAL ASSESSMENT SCORE: 7
WHEN YOU AWAKENED IN THE MORNING OVER THE LAST WEEK, PLEASE INDICATE THE AMOUNT OF TIME IT TAKES UNTIL YOU ARE AS LIMBER AS YOU WILL BE FOR THE DAY: ONE HOUR
MDHAQ FUNCTION SCORE: 1
PSYCHOLOGICAL DISTRESS SCORE: 2.2
FATIGUE SCORE: 1.1
PAIN SCORE: 7
AM STIFFNESS SCORE: 1, YES
TOTAL RAPID3 SCORE: 5.78

## 2022-05-13 NOTE — PROGRESS NOTES
Subjective:          Chief Complaint: Aye Montanez is a 67 y.o. female who had concerns including Disease Management.    HPI:  All events:  We ordered Benlysta IV unfortunately it appears she has out of pocket expense of   $3500 dollars that would be covered at 100% which is not affordable for her. There appears to be no foundation monies that were available.   Sent for Benlysta SQ and we were able to obtrain [place] support: she has with her today for training and first injection.     She failed MTX  HCQ was ? But reviewed Meng last note I have and she stopped HCQ 2017 but no notation of maculopathy    Patient is having oligoarticular swelling in hands and feet. She is having peripheral polyarthritis. Severe dryness of her eyes.   She did clarify that Dr. Fan did not see maculopathy but only expressed his concerns about the eye.         Patient is a 67-year-old female she has a history of Sjogren syndrome (estimate start 2015)   To review her Rheum diagnosis:  presumed Sjogrens with ? of SLE  +OSMAN 1:1280 speckled  High titer SSA and SSB with + dry eye and dry mouth  dsDNA neg, Singh neg, RNP neg. C3 and C4 WNL.   + hx of thrombocytopenia, + leukopenia, +polyarthritis, + sicca, +Raynauds  RF and CCP negative.   She has c/o no strength in her hands, knees are painful. Feet burn with standing. No overt swelling of joints with the exception of left voler wrist synovial cyst.    Diffuse pruritis w/o visible rash.   Trialed HCQ but patient concerned about macula and discontinued. She is followed by Dr. Fan the [facility] clinic.  She was taken off HCQ no active maculopathy but she was concerned about ASE. .   Trialed on MTX and failed.  Restasis despite burning back on  +punctal plugs. Did try Xiidra- ? If ever taken this visit.  She notes burning in her eyes.  Photophobia occasional redness to her eyes. burning Restasis no longer tolerable.    Never salagen/Evozac. . She did try xylimelts      Patient  developed neurologic events starting 1/2021- but we discontinued nifedipine (Raynaud's) in event this was leading to hypotension. Patient described episode  with disorientation, tingling lips.  repeat episode 5/25/21 with vertigo and near syncope and again 6/2/21 slurred speech, right facial droop, Imagin has been neg. for acute changes. on 6/3/21 similar event but now on left. 8/2021 another episode.   Dr. Mccurdy performed an EMG which showed mild left C6 radiculopathy. MRI showed multilevel degenerative changes and a cyst at C6/7 on the right.   EEG 2o21 WNL.   Neuro dx: complex migrain vs TIA changes ASA to Plavix.   right leg numbness referred to vestibular therapy.   HA: consider occipital nerve block  Cervical spine and left arm symptoms Pain management. trial with Tramadol for now and medrol and if not improved will discuss SONYA once safe to hold Plavix. She elected not to take Lyrica. no reported ASE.     Patient has been having SOB, dizzyness. she has maximized therapy with Pulm with little improvement. Her last PFTs show no diffusion impairment, not hypoxic   PFT Results  02/26/2019: FEV1 2.20 (89%), FEV1/FVC 85, TLC 93%, DLCO 111%    Acute Pleurisy 12/2021 with right chest pain and pain with inspiration. COVID testing neg. Prednisone and Levaquin started. +productive cough.   : CTA neg for PE. but reticulonodular opacities compatible with pneumonitis ANGELIQUE and LLL with patchy infiltrate in the RML and RLL. Trace right pleural effusion.   Seen with Pulm as of 1/12/22 given azithro   Cardiac: ECHO normal EF, no shunt, The estimated PA systolic pressure is 35 mmHg  completed stress testing with Dr. Peter ([facility]) pt states no new changes. +CAD with stenting 2017.         9/2021: continued neurologic events so we held off on starting Benlysta until completed work up. More recently presented to the ER with possible TIA.  We discontinued nifedipine for possible hypotensive episode are Raynaud's have been stable.    Events surrounding the 1st episode seem to be blood donation bilateral vertebral artery stenosis presenting with nystagmus vertigo and ataxia remark resolved with IV hydration and discontinuation of nifedipine.  Second event on did not involve a similar constellation of symptoms and just involved numbness and tingling.   continues with dizziness, mild SOB pending cardiac w/up with Dr. Peter      1/2021: flare with burning eyes, joint aches and pains and Raynauds persistent for 10 days. She tried hot shower/soak which helped temporarily. Started nifedipine 30mg daily 1/14/21 by 1/22/21 with episode of disorientation and hypotension. Full work up no seizure, no migraine she was aware at the time. Was having dizziness. No motor weakness. Slurred speech. Imaging revealed b/l vetebral arteries at 50%. And no seizure activity.   Off Nifedipine at BP marked improved. Unfortunately very effective for Raynauds.     Submitted for Benlysta with no affordable option for home injection despite [place] support.     S/p viscosupplementation in knees with ortho 3/2018      Component      Latest Ref Rng & Units 8/16/2018   Anti Sm Antibody      0.00 - 19.99 EU 2.85   Anti-Sm Interpretation      Negative Negative   Anti-SSA Antibody      0.00 - 19.99 .02 (H)   Anti-SSA Interpretation      Negative Positive (A)   Anti-SSB Antibody      0.00 - 19.99 .37 (H)   Anti-SSB Interpretation      Negative Positive (A)   Sed Rate      0 - 20 mm/Hr 33 (H)   CRP      0.0 - 8.2 mg/L 3.6   OSMAN Screen      Negative <1:160 Positive (A)   Complement (C-3)      50 - 180 mg/dL 129   Complement (C-4)      11 - 44 mg/dL 22   Complement,Total, Serum      42 - 95 U/mL 85   Rheumatoid Factor      0.0 - 15.0 IU/mL <10.0   CCP Antibodies      <5.0 U/mL <0.5     REVIEW OF SYSTEMS:    Review of Systems   Constitutional: Positive for malaise/fatigue. Negative for fever and weight loss.   HENT: Negative for sore throat.    Eyes: Negative for double  vision, photophobia and redness.   Respiratory: Negative for cough, shortness of breath and wheezing.    Cardiovascular: Negative for chest pain, palpitations and orthopnea.   Gastrointestinal: Negative for abdominal pain, constipation and diarrhea.   Genitourinary: Negative for dysuria, hematuria and urgency.   Musculoskeletal: Positive for joint pain. Negative for back pain and myalgias.   Skin: Positive for itching. Negative for rash.   Neurological: Negative for dizziness, tingling, focal weakness and headaches.   Endo/Heme/Allergies: Does not bruise/bleed easily.   Psychiatric/Behavioral: Negative for depression, hallucinations and suicidal ideas.               Objective:            Past Medical History:   Diagnosis Date    Sjogren's syndrome 12/5/2016     Family History   Problem Relation Age of Onset    Cancer Mother     Ovarian cancer Mother 40    Cancer Father         esophageal    Esophageal cancer Father     Stroke Maternal Aunt     Rheum arthritis Maternal Aunt     Rheum arthritis Paternal Uncle     Breast cancer Maternal Cousin     Breast cancer Maternal Cousin      Social History     Tobacco Use    Smoking status: Never Smoker    Smokeless tobacco: Never Used   Substance Use Topics    Alcohol use: Yes     Comment: rarely    Drug use: No         Current Outpatient Medications on File Prior to Visit   Medication Sig Dispense Refill    clopidogreL (PLAVIX) 75 mg tablet Take 1 tablet (75 mg total) by mouth once daily. Take with aspirin 81 mg for 21 days. Then, stop aspirin and take just clopidogrel thereafter. 90 tablet 3    furosemide (LASIX) 20 MG tablet Take 1 tablet (20 mg total) by mouth once daily. 90 tablet 3    levothyroxine (SYNTHROID) 25 MCG tablet Take 1 tablet (25 mcg total) by mouth before breakfast. 30 tablet 11    magnesium oxide (MAG-OX) 400 mg (241.3 mg magnesium) tablet Take 400 mg by mouth once daily.      multivitamin (THERAGRAN) per tablet Take 1 tablet by mouth once  "daily.      mycophenolate (CELLCEPT) 500 mg Tab Take 1 tablet (500 mg total) by mouth 2 (two) times daily. 60 tablet 3    omeprazole (PRILOSEC) 20 MG capsule Take 1 capsule (20 mg total) by mouth once daily. 90 capsule 3    potassium chloride SA (K-DUR,KLOR-CON) 20 MEQ tablet Take 1 tablet (20 mEq total) by mouth once daily. 90 tablet 3    rosuvastatin (CRESTOR) 40 MG Tab Take 1 tablet (40 mg total) by mouth once daily. 90 tablet 3    syringe with needle 3 mL 22 gauge x 3/4" Syrg 6 Syringes by Misc.(Non-Drug; Combo Route) route every 30 days. Use to inject cyanocobalamin once every 30 days.      zinc gluconate 50 mg tablet Take 50 mg by mouth once daily.      acetaminophen (TYLENOL) 500 MG tablet Take 1,000 mg by mouth every 6 (six) hours as needed for Pain.      albuterol (ACCUNEB) 1.25 mg/3 mL Nebu Take 6 mLs (2.5 mg total) by nebulization every 6 (six) hours as needed. Rescue (Patient not taking: No sig reported) 3 each 3    belimumab (BENLYSTA) 200 mg/mL Syrg Inject 1 mL (200 mg total) into the skin once a week. (Patient not taking: Reported on 5/13/2022) 4 mL 11    benzonatate (TESSALON) 100 MG capsule 1 - 2 po every 6 hours prn cough 45 capsule 0    cyanocobalamin (VITAMIN B-12) 1,000 mcg/mL injection Inject 1 mL (1,000 mcg total) into the skin every 30 days. (Patient not taking: Reported on 5/13/2022) 3 mL 3    fluorometholone 0.1% (FML) 0.1 % DrpS       fluticasone furoate-vilanteroL (BREO ELLIPTA) 100-25 mcg/dose diskus inhaler Inhale 1 puff into the lungs once daily. Controller (Patient not taking: Reported on 5/13/2022) 1 each 5    hydrocortisone 2.5 % cream Apply topically 2 (two) times daily. (Patient not taking: Reported on 5/13/2022) 1 Tube 1    ibuprofen (ADVIL,MOTRIN) 600 MG tablet Take 1 tablet (600 mg total) by mouth every 6 (six) hours as needed for Pain. (Patient not taking: Reported on 5/13/2022) 20 tablet 0    montelukast (SINGULAIR) 10 mg tablet TAKE 1 TABLET EVERY EVENING " (Patient not taking: Reported on 5/13/2022) 90 tablet 3     No current facility-administered medications on file prior to visit.       Vitals:    05/13/22 1205   BP: 138/76   Pulse: 65       Physical Exam:    Physical Exam  Constitutional:       Appearance: Normal appearance. She is well-developed.   HENT:      Nose: No septal deviation.      Mouth/Throat:      Mouth: No oral lesions.   Eyes:      Conjunctiva/sclera:      Right eye: Right conjunctiva is not injected.      Left eye: Left conjunctiva is not injected.      Pupils: Pupils are equal, round, and reactive to light.   Neck:      Thyroid: No thyroid mass or thyromegaly.      Vascular: No JVD.   Cardiovascular:      Rate and Rhythm: Normal rate and regular rhythm.      Pulses: Normal pulses.      Comments: No edema  Pulmonary:      Effort: Pulmonary effort is normal.      Breath sounds: Normal breath sounds.   Abdominal:      Palpations: Abdomen is soft.   Musculoskeletal:      Right shoulder: Tenderness present. No swelling. Normal range of motion.      Left shoulder: Tenderness present. No swelling. Normal range of motion.      Right elbow: No swelling. Normal range of motion. No tenderness.      Left elbow: No swelling. Normal range of motion. No tenderness.      Right wrist: Tenderness present. No swelling. Normal range of motion.      Left wrist: Tenderness present. No swelling. Normal range of motion.      Right hand: Tenderness present.      Left hand: Tenderness present.      Right hip: Normal range of motion. Normal strength.      Left hip: No tenderness. Normal range of motion.      Right knee: No swelling. Normal range of motion. No tenderness.      Left knee: No swelling. Normal range of motion. No tenderness.      Right ankle: No swelling. No tenderness. Normal range of motion.      Left ankle: No swelling. No tenderness. Normal range of motion.      Comments: Patient has some tenderness on the right MCP no overt synovitis no deformities.  No  evidence of Raynaud's today but historical triphasic color change  She has marked crepitation bilateral knees with limitation in flexion she has full extension no laxity within the joint no active effusion.      Lymphadenopathy:      Cervical: No cervical adenopathy.   Skin:     General: Skin is dry.   Neurological:      Deep Tendon Reflexes: Reflexes are normal and symmetric.               Assessment:       Encounter Diagnoses   Name Primary?    Other systemic lupus erythematosus with lung involvement Yes    High risk medications (not anticoagulants) long-term use           Plan:        Other systemic lupus erythematosus with lung involvement  -     C-Reactive Protein; Future; Expected date: 05/13/2022  -     C4 Complement; Future; Expected date: 05/13/2022  -     Anti-DNA Ab, Double-Stranded; Future; Expected date: 05/13/2022  -     C3 Complement; Future; Expected date: 05/13/2022  -     Sedimentation rate; Future; Expected date: 05/13/2022  -     CBC Auto Differential; Future; Expected date: 05/13/2022  -     Comprehensive Metabolic Panel; Future; Expected date: 05/13/2022    High risk medications (not anticoagulants) long-term use  -     C-Reactive Protein; Future; Expected date: 05/13/2022  -     C4 Complement; Future; Expected date: 05/13/2022  -     Anti-DNA Ab, Double-Stranded; Future; Expected date: 05/13/2022  -     C3 Complement; Future; Expected date: 05/13/2022  -     Sedimentation rate; Future; Expected date: 05/13/2022  -     CBC Auto Differential; Future; Expected date: 05/13/2022  -     Comprehensive Metabolic Panel; Future; Expected date: 05/13/2022      Very pleasant 66-year-old female SLE/ Sjogren's 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  Increasing joint stiffness and swelling. We have monitored SANDOVAL/SOB ? Some ILD-CTD.    She was hesitant to try salagen.   Failed MTX with ASE   Discussed the joints, increasing leucopenia, worsening dry eyes, malaise and fatigue. And recent pleurisy  unclear if this was infectious but chronic SOB/SANDOVAL despite maximized therapy.    HCQ may need to be re-visited. No confirmed maculopathy it was a rec from opho that the drug is a risk. I need to reconsider if this is an option for her.    Stable on Mobic 7.5mg BID keep this    Ok for percogesic PRN breakthrough pain. Seems to be working.     Quantiferon gold. Is negative.     Start Benlysta with self injection and training today  Labs every 3 months  DC cellcept not tolerating.     No follow-ups on file.        F/u 4 months  Thank you for allowing me to participate in the care of this very pleasant patient.    45 min consultation with greater than 50% spent in counseling, chart review and coordination of care. All questions answered.

## 2022-05-13 NOTE — PROGRESS NOTES
Benlysta injection training done with pt. Pt supplied medication and after instruction self administered. Medication at room temp, site of injection cleaned, pt injected at 45 degree angle, pt was monitored for 15mins. Pt tolerated well and is confident she can do this regularly. Elise GUAMAN LPN

## 2022-05-13 NOTE — PATIENT INSTRUCTIONS
Regarding Benlysta we are going to start today 5/13/22 and take 5/20/22, 5/27/22 and 6/3/22. We will delay the 6/10/22 dose unitl 6/12/2022 and we should be absolutely fine for the procedure with Dr. Oshea.     Stop Cellcept!!!      Labs in 2 months.

## 2022-05-17 ENCOUNTER — PES CALL (OUTPATIENT)
Dept: ADMINISTRATIVE | Facility: CLINIC | Age: 67
End: 2022-05-17
Payer: MEDICARE

## 2022-05-18 ENCOUNTER — PES CALL (OUTPATIENT)
Dept: ADMINISTRATIVE | Facility: CLINIC | Age: 67
End: 2022-05-18
Payer: MEDICARE

## 2022-06-03 ENCOUNTER — PATIENT MESSAGE (OUTPATIENT)
Dept: ENDOSCOPY | Facility: HOSPITAL | Age: 67
End: 2022-06-03
Payer: MEDICARE

## 2022-06-09 ENCOUNTER — ANESTHESIA (OUTPATIENT)
Dept: ENDOSCOPY | Facility: HOSPITAL | Age: 67
End: 2022-06-09
Payer: MEDICARE

## 2022-06-09 ENCOUNTER — ANESTHESIA EVENT (OUTPATIENT)
Dept: ENDOSCOPY | Facility: HOSPITAL | Age: 67
End: 2022-06-09
Payer: MEDICARE

## 2022-06-09 ENCOUNTER — HOSPITAL ENCOUNTER (OUTPATIENT)
Facility: HOSPITAL | Age: 67
Discharge: HOME | End: 2022-06-09
Attending: INTERNAL MEDICINE | Admitting: INTERNAL MEDICINE
Payer: MEDICARE

## 2022-06-09 VITALS
RESPIRATION RATE: 17 BRPM | DIASTOLIC BLOOD PRESSURE: 70 MMHG | OXYGEN SATURATION: 100 % | BODY MASS INDEX: 33.9 KG/M2 | SYSTOLIC BLOOD PRESSURE: 145 MMHG | TEMPERATURE: 97 F | HEIGHT: 67 IN | WEIGHT: 216 LBS | HEART RATE: 55 BPM

## 2022-06-09 DIAGNOSIS — M54.2 NECK PAIN: ICD-10-CM

## 2022-06-09 DIAGNOSIS — K59.00 CONSTIPATION, UNSPECIFIED CONSTIPATION TYPE: ICD-10-CM

## 2022-06-09 DIAGNOSIS — R10.10 PAIN OF UPPER ABDOMEN: Primary | ICD-10-CM

## 2022-06-09 DIAGNOSIS — R14.0 BLOATING: ICD-10-CM

## 2022-06-09 DIAGNOSIS — Z86.010 HISTORY OF COLON POLYPS: ICD-10-CM

## 2022-06-09 DIAGNOSIS — K59.09 OTHER CONSTIPATION: ICD-10-CM

## 2022-06-09 DIAGNOSIS — R10.13 EPIGASTRIC ABDOMINAL PAIN: ICD-10-CM

## 2022-06-09 PROCEDURE — D9220A PRA ANESTHESIA: ICD-10-PCS | Mod: CRNA,,, | Performed by: NURSE ANESTHETIST, CERTIFIED REGISTERED

## 2022-06-09 PROCEDURE — 88342 CHG IMMUNOCYTOCHEMISTRY: ICD-10-PCS | Mod: 26,,, | Performed by: PATHOLOGY

## 2022-06-09 PROCEDURE — D9220A PRA ANESTHESIA: Mod: CRNA,,, | Performed by: NURSE ANESTHETIST, CERTIFIED REGISTERED

## 2022-06-09 PROCEDURE — 43248 EGD GUIDE WIRE INSERTION: CPT | Mod: ,,, | Performed by: INTERNAL MEDICINE

## 2022-06-09 PROCEDURE — 63600175 PHARM REV CODE 636 W HCPCS: Mod: PO | Performed by: NURSE ANESTHETIST, CERTIFIED REGISTERED

## 2022-06-09 PROCEDURE — 45380 COLONOSCOPY AND BIOPSY: CPT | Mod: PO | Performed by: INTERNAL MEDICINE

## 2022-06-09 PROCEDURE — 27201012 HC FORCEPS, HOT/COLD, DISP: Mod: PO | Performed by: INTERNAL MEDICINE

## 2022-06-09 PROCEDURE — 25000003 PHARM REV CODE 250: Mod: PO | Performed by: NURSE ANESTHETIST, CERTIFIED REGISTERED

## 2022-06-09 PROCEDURE — 43239 EGD BIOPSY SINGLE/MULTIPLE: CPT | Mod: 59,,, | Performed by: INTERNAL MEDICINE

## 2022-06-09 PROCEDURE — 37000009 HC ANESTHESIA EA ADD 15 MINS: Mod: PO | Performed by: INTERNAL MEDICINE

## 2022-06-09 PROCEDURE — D9220A PRA ANESTHESIA: Mod: ANES,,, | Performed by: ANESTHESIOLOGY

## 2022-06-09 PROCEDURE — 43248 EGD GUIDE WIRE INSERTION: CPT | Mod: PO | Performed by: INTERNAL MEDICINE

## 2022-06-09 PROCEDURE — 88342 IMHCHEM/IMCYTCHM 1ST ANTB: CPT | Mod: 26,,, | Performed by: PATHOLOGY

## 2022-06-09 PROCEDURE — 45380 PR COLONOSCOPY,BIOPSY: ICD-10-PCS | Mod: ,,, | Performed by: INTERNAL MEDICINE

## 2022-06-09 PROCEDURE — 37000008 HC ANESTHESIA 1ST 15 MINUTES: Mod: PO | Performed by: INTERNAL MEDICINE

## 2022-06-09 PROCEDURE — 63600175 PHARM REV CODE 636 W HCPCS: Mod: PO | Performed by: INTERNAL MEDICINE

## 2022-06-09 PROCEDURE — 88305 TISSUE EXAM BY PATHOLOGIST: CPT | Mod: 59 | Performed by: PATHOLOGY

## 2022-06-09 PROCEDURE — 43239 EGD BIOPSY SINGLE/MULTIPLE: CPT | Mod: 59,PO | Performed by: INTERNAL MEDICINE

## 2022-06-09 PROCEDURE — 88342 IMHCHEM/IMCYTCHM 1ST ANTB: CPT | Performed by: PATHOLOGY

## 2022-06-09 PROCEDURE — 43248 PR EGD, FLEX, W/DILATION OVER GUIDEWIRE: ICD-10-PCS | Mod: ,,, | Performed by: INTERNAL MEDICINE

## 2022-06-09 PROCEDURE — 43239 PR EGD, FLEX, W/BIOPSY, SGL/MULTI: ICD-10-PCS | Mod: 59,,, | Performed by: INTERNAL MEDICINE

## 2022-06-09 PROCEDURE — D9220A PRA ANESTHESIA: ICD-10-PCS | Mod: ANES,,, | Performed by: ANESTHESIOLOGY

## 2022-06-09 PROCEDURE — 88305 TISSUE EXAM BY PATHOLOGIST: ICD-10-PCS | Mod: 26,,, | Performed by: PATHOLOGY

## 2022-06-09 PROCEDURE — 45380 COLONOSCOPY AND BIOPSY: CPT | Mod: ,,, | Performed by: INTERNAL MEDICINE

## 2022-06-09 PROCEDURE — 88305 TISSUE EXAM BY PATHOLOGIST: CPT | Mod: 26,,, | Performed by: PATHOLOGY

## 2022-06-09 RX ORDER — LIDOCAINE HYDROCHLORIDE 20 MG/ML
INJECTION INTRAVENOUS
Status: DISCONTINUED | OUTPATIENT
Start: 2022-06-09 | End: 2022-06-09

## 2022-06-09 RX ORDER — PROPOFOL 10 MG/ML
VIAL (ML) INTRAVENOUS CONTINUOUS PRN
Status: DISCONTINUED | OUTPATIENT
Start: 2022-06-09 | End: 2022-06-09

## 2022-06-09 RX ORDER — SODIUM CHLORIDE 0.9 % (FLUSH) 0.9 %
10 SYRINGE (ML) INJECTION EVERY 6 HOURS PRN
Status: DISCONTINUED | OUTPATIENT
Start: 2022-06-09 | End: 2022-06-09 | Stop reason: HOSPADM

## 2022-06-09 RX ORDER — PROPOFOL 10 MG/ML
VIAL (ML) INTRAVENOUS
Status: DISCONTINUED | OUTPATIENT
Start: 2022-06-09 | End: 2022-06-09

## 2022-06-09 RX ORDER — SODIUM CHLORIDE, SODIUM LACTATE, POTASSIUM CHLORIDE, CALCIUM CHLORIDE 600; 310; 30; 20 MG/100ML; MG/100ML; MG/100ML; MG/100ML
INJECTION, SOLUTION INTRAVENOUS CONTINUOUS
Status: DISCONTINUED | OUTPATIENT
Start: 2022-06-09 | End: 2022-06-09 | Stop reason: HOSPADM

## 2022-06-09 RX ADMIN — PROPOFOL 70 MG: 10 INJECTION, EMULSION INTRAVENOUS at 08:06

## 2022-06-09 RX ADMIN — LIDOCAINE HYDROCHLORIDE 100 MG: 20 INJECTION, SOLUTION INTRAVENOUS at 08:06

## 2022-06-09 RX ADMIN — PROPOFOL 30 MG: 10 INJECTION, EMULSION INTRAVENOUS at 08:06

## 2022-06-09 RX ADMIN — PROPOFOL 200 MCG/KG/MIN: 10 INJECTION, EMULSION INTRAVENOUS at 08:06

## 2022-06-09 RX ADMIN — SODIUM CHLORIDE, SODIUM LACTATE, POTASSIUM CHLORIDE, AND CALCIUM CHLORIDE: .6; .31; .03; .02 INJECTION, SOLUTION INTRAVENOUS at 08:06

## 2022-06-09 NOTE — H&P
"History & Physical - Short Stay  Gastroenterology      SUBJECTIVE:     Procedure: Colonoscopy and EGD    Chief Complaint/Indication for Procedure: Epigastric Pain, Change in Bowel Habits and Previous Polyps    PTA Medications   Medication Sig    acetaminophen (TYLENOL) 500 MG tablet Take 1,000 mg by mouth every 6 (six) hours as needed for Pain.    belimumab (BENLYSTA) 200 mg/mL Syrg Inject 1 mL (200 mg total) into the skin once a week.    clopidogreL (PLAVIX) 75 mg tablet Take 1 tablet (75 mg total) by mouth once daily. Take with aspirin 81 mg for 21 days. Then, stop aspirin and take just clopidogrel thereafter.    fluticasone furoate-vilanteroL (BREO ELLIPTA) 100-25 mcg/dose diskus inhaler Inhale 1 puff into the lungs once daily. Controller    furosemide (LASIX) 20 MG tablet Take 1 tablet (20 mg total) by mouth once daily.    hydrocortisone 2.5 % cream Apply topically 2 (two) times daily.    levothyroxine (SYNTHROID) 25 MCG tablet Take 1 tablet (25 mcg total) by mouth before breakfast.    magnesium oxide (MAG-OX) 400 mg (241.3 mg magnesium) tablet Take 400 mg by mouth once daily.    multivitamin (THERAGRAN) per tablet Take 1 tablet by mouth once daily.    omeprazole (PRILOSEC) 20 MG capsule Take 1 capsule (20 mg total) by mouth once daily.    potassium chloride SA (K-DUR,KLOR-CON) 20 MEQ tablet Take 1 tablet (20 mEq total) by mouth once daily.    rosuvastatin (CRESTOR) 40 MG Tab Take 1 tablet (40 mg total) by mouth once daily.    syringe with needle 3 mL 22 gauge x 3/4" Syrg 6 Syringes by Misc.(Non-Drug; Combo Route) route every 30 days. Use to inject cyanocobalamin once every 30 days.    zinc gluconate 50 mg tablet Take 50 mg by mouth once daily.    albuterol (ACCUNEB) 1.25 mg/3 mL Nebu Take 6 mLs (2.5 mg total) by nebulization every 6 (six) hours as needed. Rescue (Patient not taking: No sig reported)    benzonatate (TESSALON) 100 MG capsule 1 - 2 po every 6 hours prn cough (Patient not taking: " "Reported on 6/2/2022)    cyanocobalamin (VITAMIN B-12) 1,000 mcg/mL injection Inject 1 mL (1,000 mcg total) into the skin every 30 days. (Patient not taking: Reported on 6/6/2022)    fluorometholone 0.1% (FML) 0.1 % DrpS     ibuprofen (ADVIL,MOTRIN) 600 MG tablet Take 1 tablet (600 mg total) by mouth every 6 (six) hours as needed for Pain. (Patient not taking: Reported on 6/6/2022)    montelukast (SINGULAIR) 10 mg tablet TAKE 1 TABLET EVERY EVENING (Patient not taking: Reported on 6/6/2022)    mycophenolate (CELLCEPT) 500 mg Tab Take 1 tablet (500 mg total) by mouth 2 (two) times daily. (Patient not taking: No sig reported)       Review of patient's allergies indicates:   Allergen Reactions    Ceftin [cefuroxime axetil] Itching    Gabapentin      Felt like "no blood flow to my legs"    Metoprolol      Leg pain    Versed [midazolam]      "wants to come out of my skin - I get hyped"        Past Medical History:   Diagnosis Date    Sjogren's syndrome 12/05/2016    Systemic lupus erythematosus, organ or system involvement unspecified      Past Surgical History:   Procedure Laterality Date    APPENDECTOMY      CHOLECYSTECTOMY      COLONOSCOPY      CORONARY STENT PLACEMENT      10/2017    FRACTURE SURGERY Right     foot    HYSTERECTOMY      1984    KNEE ARTHROSCOPY Right 2/2016    OOPHORECTOMY Bilateral     hyst. 1984    TOTAL REDUCTION MAMMOPLASTY Bilateral     1999    UPPER GASTROINTESTINAL ENDOSCOPY       Family History   Problem Relation Age of Onset    Cancer Mother     Ovarian cancer Mother 40    Cancer Father         esophageal    Esophageal cancer Father     Stroke Maternal Aunt     Rheum arthritis Maternal Aunt     Rheum arthritis Paternal Uncle     Breast cancer Maternal Cousin     Breast cancer Maternal Cousin     Glaucoma Neg Hx     Macular degeneration Neg Hx      Social History     Tobacco Use    Smoking status: Never Smoker    Smokeless tobacco: Never Used   Substance Use " Topics    Alcohol use: Yes     Comment: rarely    Drug use: No     OBJECTIVE:     Physical Exam:                                                       GENERAL:  Comfortable, in no acute distress.                                 HEENT EXAM:  Nonicteric.  No adenopathy.  Oropharynx is clear.               NECK:  Supple.                                                               LUNGS:  Clear.                                                               CARDIAC:  Regular rate and rhythm.  S1, S2.  No murmur.                      ABDOMEN:  Soft, positive bowel sounds, nontender.  No hepatosplenomegaly or masses.  No rebound or guarding.                                             EXTREMITIES:  No edema.     MENTAL STATUS:  Normal, alert and oriented.    ASSESSMENT/PLAN:     Assessment: Epigastric Pain, Change in Bowel Habits and Previous Polyps    Plan: Colonoscopy and EGD

## 2022-06-09 NOTE — ANESTHESIA POSTPROCEDURE EVALUATION
Anesthesia Post Evaluation    Patient: Aye Montanez    Procedure(s) Performed: Procedure(s) (LRB):  EGD (ESOPHAGOGASTRODUODENOSCOPY) (N/A)  COLONOSCOPY (N/A)    Final Anesthesia Type: general      Patient location during evaluation: PACU  Patient participation: Yes- Able to Participate  Level of consciousness: sedated and awake  Post-procedure vital signs: reviewed and stable  Pain management: adequate  Airway patency: patent    PONV status at discharge: No PONV  Anesthetic complications: no      Cardiovascular status: hypertensive and blood pressure returned to baseline  Respiratory status: spontaneous ventilation  Hydration status: euvolemic  Follow-up not needed.          Vitals Value Taken Time   /70 06/09/22 0928   Temp 36.2 °C (97.2 °F) 06/09/22 0905   Pulse 55 06/09/22 0928   Resp 17 06/09/22 0928   SpO2 100 % 06/09/22 0928         Event Time   Out of Recovery 09:38:00         Pain/Gigi Score: Gigi Score: 10 (6/9/2022  9:22 AM)

## 2022-06-09 NOTE — PLAN OF CARE
Patient AAOx4. Resp even, unlabored. No complaints of pain at this time. NAD noted. Patient tolerating PO well. Discharge and follow-up instructions discussed. Patient and  verbalized understanding. Patient meets criteria for discharge home.   
VSS, all questions answered. Denies recent fever or illness. Pt states ready for procedure.  
No

## 2022-06-09 NOTE — TRANSFER OF CARE
"Anesthesia Transfer of Care Note    Patient: Aye Montanez    Procedure(s) Performed: Procedure(s) (LRB):  EGD (ESOPHAGOGASTRODUODENOSCOPY) (N/A)  COLONOSCOPY (N/A)    Patient location: PACU    Anesthesia Type: general    Transport from OR: Transported from OR on room air with adequate spontaneous ventilation    Post pain: adequate analgesia    Post assessment: no apparent anesthetic complications and tolerated procedure well    Post vital signs: stable    Level of consciousness: awake, alert and oriented    Nausea/Vomiting: no nausea/vomiting    Complications: none    Transfer of care protocol was followed      Last vitals:   Visit Vitals  BP (!) 114/55 (BP Location: Left arm, Patient Position: Lying)   Pulse 65   Temp 36.2 °C (97.2 °F) (Skin)   Resp 16   Ht 5' 7" (1.702 m)   Wt 98 kg (216 lb)   SpO2 99%   Breastfeeding No   BMI 33.83 kg/m²     "

## 2022-06-09 NOTE — PROVATION PATIENT INSTRUCTIONS
Discharge Summary/Instructions after an Endoscopic Procedure  Patient Name: Aye Montanez  Patient MRN: [id number]  Patient YOB: 1955 Thursday, June 9, 2022  Eugene Del Castillo MD  Dear patient,  As a result of recent federal legislation (The Federal Cures Act), you may   receive lab or pathology results from your procedure in your [facility]   account before your physician is able to contact you. Your physician or   their representative will relay the results to you with their   recommendations at their soonest availability.  Thank you,  RESTRICTIONS:  During your procedure today, you received medications for sedation.  These   medications may affect your judgment, balance and coordination.  Therefore,   for 24 hours, you have the following restrictions:   - DO NOT drive a car, operate machinery, make legal/financial decisions,   sign important papers or drink alcohol.    ACTIVITY:  Today: no heavy lifting, straining or running due to procedural   sedation/anesthesia.  The following day: return to full activity including work.  DIET:  Eat and drink normally unless instructed otherwise.     TREATMENT FOR COMMON SIDE EFFECTS:  - Mild abdominal pain, nausea, belching, bloating or excessive gas:  rest,   eat lightly and use a heating pad.  - Sore Throat: treat with throat lozenges and/or gargle with warm salt   water.  - Because air was used during the procedure, expelling large amounts of air   from your rectum or belching is normal.  - If a bowel prep was taken, you may not have a bowel movement for 1-3 days.    This is normal.  SYMPTOMS TO WATCH FOR AND REPORT TO YOUR PHYSICIAN:  1. Abdominal pain or bloating, other than gas cramps.  2. Chest pain.  3. Back pain.  4. Signs of infection such as: chills or fever occurring within 24 hours   after the procedure.  5. Rectal bleeding, which would show as bright red, maroon, or black stools.   (A tablespoon of blood from the rectum is not serious, especially  if   hemorrhoids are present.)  6. Vomiting.  7. Weakness or dizziness.  GO DIRECTLY TO THE NEAREST EMERGENCY ROOM IF YOU HAVE ANY OF THE FOLLOWING:      Difficulty breathing              Chills and/or fever over 101 F   Persistent vomiting and/or vomiting blood   Severe abdominal pain   Severe chest pain   Black, tarry stools   Bleeding- more than one tablespoon   Any other symptom or condition that you feel may need urgent attention  Your doctor recommends these additional instructions:  If any biopsies were taken, your doctors clinic will contact you in 1 to 2   weeks with any results.  You are being discharged to home.   Advance your diet as tolerated.   Continue your present medications.   We are waiting for your pathology results.  For questions, problems or results please call your physician - Eugene Del Castillo MD at Work:  [phone].  EMERGENCY PHONE NUMBER: [phone], LAB RESULTS: [phone]  IF A COMPLICATION OR EMERGENCY SITUATION ARISES AND YOU ARE UNABLE TO REACH   YOUR PHYSICIAN - GO DIRECTLY TO THE EMERGENCY ROOM.  ___________________________________________  Nurse Signature  ___________________________________________  Patient/Designated Responsible Party Signature  Eugene Del Castillo MD  6/9/2022 8:59:26 AM  This report has been verified and signed electronically.  Dear patient,  As a result of recent federal legislation (The Federal Cures Act), you may   receive lab or pathology results from your procedure in your [facility]   account before your physician is able to contact you. Your physician or   their representative will relay the results to you with their   recommendations at their soonest availability.  Thank you.  PROVATION

## 2022-06-09 NOTE — ANESTHESIA PREPROCEDURE EVALUATION
06/09/2022  Aye Montanez is a 67 y.o., female.    Pre-op Assessment    I have reviewed the Patient Summary Reports.    I have reviewed the Nursing Notes. I have reviewed the NPO Status.   I have reviewed the Medications.     Review of Systems  Anesthesia Hx:  No problems with previous Anesthesia Denies Hx of Anesthetic complications  Personal Hx of Anesthesia complications  Paradoxical Response To Benzodiazapines (NO VERSED)   Social:  Non-Smoker    Cardiovascular:   Denies Hypertension.  Denies MI.  Denies CAD.   CABG/stent   Denies Angina. CHF SANDOVAL ECG has been reviewed. · There is no evidence of intracardiac shunting.  · The left ventricle is normal in size with normal systolic function.  · The estimated ejection fraction is 55%.  · Grade I left ventricular diastolic dysfunction.  · The estimated PA systolic pressure is 35 mmHg.  · Normal right ventricular size with normal right ventricular systolic function.  · Normal central venous pressure (3 mmHg).  · Mild left atrial enlargement.  · Mild mitral regurgitation.     Pulmonary:   Denies COPD.  Denies Asthma. Shortness of breath  Denies Recent URI.    Renal/:   Denies Chronic Renal Disease.     Hepatic/GI:   Bowel Prep. Hiatal Hernia, Denies GERD. Denies Liver Disease.    Neurological:   Denies TIA. Denies CVA. Neuromuscular Disease,  Denies Seizures.    Endocrine:   Denies Diabetes. Denies Hypothyroidism.    Psych:   Denies Psychiatric History.          Physical Exam  General:  Obesity      Airway/Jaw/Neck:  Airway Findings: Mouth Opening: Normal   Tongue: Normal   General Airway Assessment: Adult, Good Mallampati: II  Improves to II with phonation.  TM Distance: 4-6 cm       Dental:  Dental Findings: In tact     Chest/Lungs:  Chest/Lungs Findings: Clear to auscultation, Normal Respiratory Rate      Heart/Vascular:  Heart Findings: Rate: Normal   Rhythm: Regular Rhythm  Sounds: Normal  Heart murmur: negative       Mental Status:  Mental Status Findings:  Cooperative, Alert and Oriented         Anesthesia Plan  Type of Anesthesia, risks & benefits discussed:  Anesthesia Type:  general    Patient's Preference:   Plan Factors:          Intra-op Monitoring Plan: Standard ASA Monitors  Intra-op Monitoring Plan Comments:   Post Op Pain Control Plan:   Post Op Pain Control Plan Comments:     Induction:   IV  Beta Blocker:  Patient is not currently on a Beta-Blocker (No further documentation required).       Informed Consent: Informed consent signed with the Patient and all parties understand the risks and agree with anesthesia plan.  All questions answered.  Anesthesia consent signed with patient.  ASA Score: 3     Day of Surgery Review of History & Physical: I have interviewed and examined the patient. I have reviewed the patient's H&P dated:  There are no significant changes.            Ready For Surgery From Anesthesia Perspective.           Physical Exam  General: Obesity    Airway:  Mallampati: II / II  Mouth Opening: Normal  TM Distance: 4-6 cm  Tongue: Normal    Dental:  In tact    Chest/Lungs:  Clear to auscultation, Normal Respiratory Rate    Heart:  Rate: Normal  Rhythm: Regular Rhythm  Sounds: Normal          Anesthesia Plan  Type of Anesthesia, risks & benefits discussed:    Anesthesia Type: general  Intra-op Monitoring Plan: Standard ASA Monitors  Induction:  IV  Informed Consent: Informed consent signed with the Patient and all parties understand the risks and agree with anesthesia plan.  All questions answered.   ASA Score: 3  Day of Surgery Review of History & Physical: I have interviewed and examined the patient. I have reviewed the patient's H&P dated:     Ready For Surgery From Anesthesia Perspective.       .

## 2022-06-09 NOTE — PROVATION PATIENT INSTRUCTIONS
Discharge Summary/Instructions after an Endoscopic Procedure  Patient Name: Aye Montanez  Patient MRN: [id number]  Patient YOB: 1955 Thursday, June 9, 2022  Eugene Del Castillo MD  Dear patient,  As a result of recent federal legislation (The Federal Cures Act), you may   receive lab or pathology results from your procedure in your [facility]   account before your physician is able to contact you. Your physician or   their representative will relay the results to you with their   recommendations at their soonest availability.  Thank you,  RESTRICTIONS:  During your procedure today, you received medications for sedation.  These   medications may affect your judgment, balance and coordination.  Therefore,   for 24 hours, you have the following restrictions:   - DO NOT drive a car, operate machinery, make legal/financial decisions,   sign important papers or drink alcohol.    ACTIVITY:  Today: no heavy lifting, straining or running due to procedural   sedation/anesthesia.  The following day: return to full activity including work.  DIET:  Eat and drink normally unless instructed otherwise.     TREATMENT FOR COMMON SIDE EFFECTS:  - Mild abdominal pain, nausea, belching, bloating or excessive gas:  rest,   eat lightly and use a heating pad.  - Sore Throat: treat with throat lozenges and/or gargle with warm salt   water.  - Because air was used during the procedure, expelling large amounts of air   from your rectum or belching is normal.  - If a bowel prep was taken, you may not have a bowel movement for 1-3 days.    This is normal.  SYMPTOMS TO WATCH FOR AND REPORT TO YOUR PHYSICIAN:  1. Abdominal pain or bloating, other than gas cramps.  2. Chest pain.  3. Back pain.  4. Signs of infection such as: chills or fever occurring within 24 hours   after the procedure.  5. Rectal bleeding, which would show as bright red, maroon, or black stools.   (A tablespoon of blood from the rectum is not serious, especially  if   hemorrhoids are present.)  6. Vomiting.  7. Weakness or dizziness.  GO DIRECTLY TO THE NEAREST EMERGENCY ROOM IF YOU HAVE ANY OF THE FOLLOWING:      Difficulty breathing              Chills and/or fever over 101 F   Persistent vomiting and/or vomiting blood   Severe abdominal pain   Severe chest pain   Black, tarry stools   Bleeding- more than one tablespoon   Any other symptom or condition that you feel may need urgent attention  Your doctor recommends these additional instructions:  If any biopsies were taken, your doctors clinic will contact you in 1 to 2   weeks with any results.  Your physician has recommended a repeat colonoscopy in five years for   surveillance.   You are being discharged to home.   Advance your diet as tolerated.   Continue your present medications.   We are waiting for your pathology results.  For questions, problems or results please call your physician - Eugene Del Castillo MD at Work:  [phone].  EMERGENCY PHONE NUMBER: [phone], LAB RESULTS: [phone]  IF A COMPLICATION OR EMERGENCY SITUATION ARISES AND YOU ARE UNABLE TO REACH   YOUR PHYSICIAN - GO DIRECTLY TO THE EMERGENCY ROOM.  ___________________________________________  Nurse Signature  ___________________________________________  Patient/Designated Responsible Party Signature  Eugene Del Castillo MD  6/9/2022 9:03:27 AM  This report has been verified and signed electronically.  Dear patient,  As a result of recent federal legislation (The Federal Cures Act), you may   receive lab or pathology results from your procedure in your [facility]   account before your physician is able to contact you. Your physician or   their representative will relay the results to you with their   recommendations at their soonest availability.  Thank you.  PROVATION

## 2022-06-10 ENCOUNTER — HOSPITAL ENCOUNTER (OUTPATIENT)
Dept: RADIOLOGY | Facility: HOSPITAL | Age: 67
Discharge: HOME | End: 2022-06-10
Attending: ANESTHESIOLOGY
Payer: MEDICARE

## 2022-06-10 ENCOUNTER — HOSPITAL ENCOUNTER (OUTPATIENT)
Facility: HOSPITAL | Age: 67
Discharge: HOME | End: 2022-06-10
Attending: ANESTHESIOLOGY | Admitting: ANESTHESIOLOGY
Payer: MEDICARE

## 2022-06-10 DIAGNOSIS — M54.12 CERVICAL RADICULOPATHY: Primary | ICD-10-CM

## 2022-06-10 DIAGNOSIS — M54.2 NECK PAIN: ICD-10-CM

## 2022-06-10 PROCEDURE — 25500020 PHARM REV CODE 255: Mod: PO | Performed by: ANESTHESIOLOGY

## 2022-06-10 PROCEDURE — 63600175 PHARM REV CODE 636 W HCPCS: Mod: PO

## 2022-06-10 PROCEDURE — 76000 FLUOROSCOPY <1 HR PHYS/QHP: CPT | Mod: TC,PO

## 2022-06-10 PROCEDURE — 62321 PR INJ CERV/THORAC, W/GUIDANCE: ICD-10-PCS | Mod: ,,, | Performed by: ANESTHESIOLOGY

## 2022-06-10 PROCEDURE — 62321 NJX INTERLAMINAR CRV/THRC: CPT | Mod: ,,, | Performed by: ANESTHESIOLOGY

## 2022-06-10 PROCEDURE — 25000003 PHARM REV CODE 250: Mod: PO | Performed by: ANESTHESIOLOGY

## 2022-06-10 PROCEDURE — 63600175 PHARM REV CODE 636 W HCPCS: Mod: PO | Performed by: ANESTHESIOLOGY

## 2022-06-10 PROCEDURE — 62321 NJX INTERLAMINAR CRV/THRC: CPT | Mod: PO | Performed by: ANESTHESIOLOGY

## 2022-06-10 RX ORDER — DEXAMETHASONE SODIUM PHOSPHATE 10 MG/ML
INJECTION INTRAMUSCULAR; INTRAVENOUS
Status: DISCONTINUED | OUTPATIENT
Start: 2022-06-10 | End: 2022-06-10 | Stop reason: HOSPADM

## 2022-06-10 RX ORDER — SODIUM CHLORIDE, SODIUM LACTATE, POTASSIUM CHLORIDE, CALCIUM CHLORIDE 600; 310; 30; 20 MG/100ML; MG/100ML; MG/100ML; MG/100ML
INJECTION, SOLUTION INTRAVENOUS CONTINUOUS
Status: DISCONTINUED | OUTPATIENT
Start: 2022-06-10 | End: 2022-06-10 | Stop reason: HOSPADM

## 2022-06-10 RX ORDER — LIDOCAINE HYDROCHLORIDE 10 MG/ML
INJECTION, SOLUTION EPIDURAL; INFILTRATION; INTRACAUDAL; PERINEURAL
Status: DISCONTINUED | OUTPATIENT
Start: 2022-06-10 | End: 2022-06-10 | Stop reason: HOSPADM

## 2022-06-10 RX ADMIN — SODIUM CHLORIDE, SODIUM LACTATE, POTASSIUM CHLORIDE, AND CALCIUM CHLORIDE: .6; .31; .03; .02 INJECTION, SOLUTION INTRAVENOUS at 02:06

## 2022-06-10 NOTE — DISCHARGE SUMMARY
[facility]  Discharge Note  Short Stay    Admit Date: 6/10/2022    Discharge Date: 6/10/2022    Attending Physician: Dickson Oshea     Discharge Provider: Dickson Oshea    Diagnoses:  There are no hospital problems to display for this patient.      Discharged Condition: Good    Final Diagnoses: Cervical radiculopathy [M54.12]    Disposition: Home or Self Care    Hospital Course: No complications, uneventful    Outcome of Hospitalization, Treatment, Procedure, or Surgery:  Patient was admitted for outpatient interventional pain management procedure. The patient tolerated the procedure well with no complications.    Follow up/Patient Instructions:  Follow up as scheduled in Pain Management office in 2-3 weeks.  Patient has received instructions and follow up date and time.    Medications:  Continue previous medications, except restart plavix in 24 hours    Discharge Procedure Orders   Notify your health care provider if you experience any of the following:  temperature >100.4     Notify your health care provider if you experience any of the following:  persistent nausea and vomiting or diarrhea     Notify your health care provider if you experience any of the following:  severe uncontrolled pain     Notify your health care provider if you experience any of the following:  redness, tenderness, or signs of infection (pain, swelling, redness, odor or green/yellow discharge around incision site)     Notify your health care provider if you experience any of the following:  difficulty breathing or increased cough     Notify your health care provider if you experience any of the following:  severe persistent headache     Notify your health care provider if you experience any of the following:  worsening rash     Notify your health care provider if you experience any of the following:  persistent dizziness, light-headedness, or visual disturbances     Notify your health care provider if you experience any of the  following:  increased confusion or weakness     Activity as tolerated

## 2022-06-10 NOTE — DISCHARGE INSTRUCTIONS
Home Care Instructions    Apply ice pack to injection site for 20 minute periods for the first 24 hours for soreness/discomfort at injection site  DO NOT USE HEAT FOR 24 HOURS  Keep site clean and dry for 24 hours. If Band-Aid present remove when desired.  Do not drive until tomorrow  Take care when walking after a lumbar injection    STEROIDS or RADIOFREQUENCY  Make take 10-14 days for full affects  Avoid strenuous exercises for 2 days    Resume home medications as prescribes today  Resume Aspirin, Plavix or Coumadin the day after the procedure unless otherwise intructed    SEE IMMEDIATE MEDICAL HELP FOR:  Severe increase in your usual pain or appearance of new pain  Prolonged or increasing weakness or numbness in the legs or arms  Drainage, redness, active bleeding, or increased swelling at the injection site  Temperature over 100.0 degrees F.  Headache that increases when your head is upright and decrease when you lie flat    CALL 911 OR GO DIRECTLY TO EMERGENCY DEPARTMENT FOR:  Shortness of breath, chest pain, or problems breathing

## 2022-06-10 NOTE — H&P
[facility] - [facility]  History & Physical - Short Stay  Pain Management       SUBJECTIVE:     Procedure: Procedure(s) (LRB):  Injection-steroid-epidural-cervical (N/A)    Chief Complaint/Reason for Admission:  Cervical radiculopathy [M54.12]    PTA Medications   Medication Sig    furosemide (LASIX) 20 MG tablet Take 1 tablet (20 mg total) by mouth once daily.    levothyroxine (SYNTHROID) 25 MCG tablet Take 1 tablet (25 mcg total) by mouth before breakfast.    magnesium oxide (MAG-OX) 400 mg (241.3 mg magnesium) tablet Take 400 mg by mouth once daily.    multivitamin (THERAGRAN) per tablet Take 1 tablet by mouth once daily.    omeprazole (PRILOSEC) 20 MG capsule Take 1 capsule (20 mg total) by mouth once daily.    potassium chloride SA (K-DUR,KLOR-CON) 20 MEQ tablet Take 1 tablet (20 mEq total) by mouth once daily.    rosuvastatin (CRESTOR) 40 MG Tab Take 1 tablet (40 mg total) by mouth once daily.    zinc gluconate 50 mg tablet Take 50 mg by mouth once daily.    acetaminophen (TYLENOL) 500 MG tablet Take 1,000 mg by mouth every 6 (six) hours as needed for Pain.    albuterol (ACCUNEB) 1.25 mg/3 mL Nebu Take 6 mLs (2.5 mg total) by nebulization every 6 (six) hours as needed. Rescue (Patient not taking: No sig reported)    belimumab (BENLYSTA) 200 mg/mL Syrg Inject 1 mL (200 mg total) into the skin once a week.    benzonatate (TESSALON) 100 MG capsule 1 - 2 po every 6 hours prn cough (Patient not taking: Reported on 6/2/2022)    clopidogreL (PLAVIX) 75 mg tablet Take 1 tablet (75 mg total) by mouth once daily. Take with aspirin 81 mg for 21 days. Then, stop aspirin and take just clopidogrel thereafter.    cyanocobalamin (VITAMIN B-12) 1,000 mcg/mL injection Inject 1 mL (1,000 mcg total) into the skin every 30 days. (Patient not taking: Reported on 6/6/2022)    fluorometholone 0.1% (FML) 0.1 % DrpS     fluticasone furoate-vilanteroL (BREO ELLIPTA) 100-25 mcg/dose diskus inhaler Inhale 1 puff into the lungs  "once daily. Controller    hydrocortisone 2.5 % cream Apply topically 2 (two) times daily.    ibuprofen (ADVIL,MOTRIN) 600 MG tablet Take 1 tablet (600 mg total) by mouth every 6 (six) hours as needed for Pain. (Patient not taking: Reported on 6/6/2022)    montelukast (SINGULAIR) 10 mg tablet TAKE 1 TABLET EVERY EVENING (Patient not taking: Reported on 6/6/2022)    mycophenolate (CELLCEPT) 500 mg Tab Take 1 tablet (500 mg total) by mouth 2 (two) times daily. (Patient not taking: No sig reported)    syringe with needle 3 mL 22 gauge x 3/4" Syrg 6 Syringes by Misc.(Non-Drug; Combo Route) route every 30 days. Use to inject cyanocobalamin once every 30 days.       Review of patient's allergies indicates:   Allergen Reactions    Ceftin [cefuroxime axetil] Itching    Gabapentin      Felt like "no blood flow to my legs"    Metoprolol      Leg pain    Versed [midazolam]      "wants to come out of my skin - I get hyped"       Past Medical History:   Diagnosis Date    Sjogren's syndrome 12/05/2016    Systemic lupus erythematosus, organ or system involvement unspecified      Past Surgical History:   Procedure Laterality Date    APPENDECTOMY      CHOLECYSTECTOMY      COLONOSCOPY      CORONARY STENT PLACEMENT      10/2017    FRACTURE SURGERY Right     foot    HYSTERECTOMY      1984    KNEE ARTHROSCOPY Right 2/2016    OOPHORECTOMY Bilateral     hyst. 1984    TOTAL REDUCTION MAMMOPLASTY Bilateral     1999    UPPER GASTROINTESTINAL ENDOSCOPY       Family History   Problem Relation Age of Onset    Cancer Mother     Ovarian cancer Mother 40    Cancer Father         esophageal    Esophageal cancer Father     Stroke Maternal Aunt     Rheum arthritis Maternal Aunt     Rheum arthritis Paternal Uncle     Breast cancer Maternal Cousin     Breast cancer Maternal Cousin     Glaucoma Neg Hx     Macular degeneration Neg Hx      Social History     Tobacco Use    Smoking status: Never Smoker    Smokeless tobacco: " Never Used   Substance Use Topics    Alcohol use: Yes     Comment: rarely    Drug use: No        Current Facility-Administered Medications:     lactated ringers infusion, , Intravenous, Continuous, Walter Ruffin PA-C    Review of Systems:  General ROS: negative for - fever  Dermatological ROS: negative for rash    OBJECTIVE:     Vital Signs (Most Recent):  Temp: 98.2 °F (36.8 °C) (06/10/22 1422)  Pulse: 66 (06/10/22 1422)  Resp: 17 (06/10/22 1422)  BP: (!) 142/63 (06/10/22 1422)  SpO2: 99 % (06/10/22 1422)  There is no height or weight on file to calculate BMI.    Physical Exam:  General appearance - alert, well appearing, and in no distress  Mental status - AOx3  Eyes - pupils equal and reactive, extraocular eye movements intact  Heart - normal rate, regular rhythm, normal S1, S2, no murmurs, rubs, clicks or gallops  Chest - clear to auscultation, no wheezes, rales or rhonchi, symmetric air entry  Abdomen - soft, nontender, nondistended, no masses or organomegaly  Neurological - alert, oriented, normal speech, no focal findings or movement disorder noted  Extremities - peripheral pulses normal, no pedal edema, no clubbing or cyanosis      ASSESSMENT/PLAN:     There are no hospital problems to display for this patient.       No changes since seen on 5/9/22.  We will proceed with cervical SONYA. The risks and benefits of this intervention, and alternative therapies were discussed with the patient.  The discussion of risks included infection, bleeding, need for additional procedures or surgery, nerve damage.  Questions regarding the procedure, risks, expected outcome, and possible side effects were solicited and answered to the patient's satisfaction.  Sarah Montanez wishes to proceed with the injection or procedure.  Written consent was obtained.    Proceed with intervention as scheduled.    Dickson Oshea M.D.  Interventional Pain Medicine / Anesthesiology

## 2022-06-10 NOTE — OP NOTE
"Procedure Note    Procedure Date: 6/10/2022    Procedure Performed:  C7-T1 cervical interlaminar epidural steroid injection under fluoroscopy.    Indications: Patient has failed conservative therapy.      Pre-op diagnosis: Cervical Radiculopathy    Post-op diagnosis: same    Physician: Dickson Oshea MD    IV anxiolysis medications: none    Medications injected: Dexamethasone 15mg, 3.5 mL sterile, preservative-free normal saline.    Local anesthetic used: 1% Lidocaine, 1 ml, 8.4% sodium bicarbonate 0.25ml    Estimated Blood Loss: none    Complications:  none    Technique:  The patient was interviewed in the holding area and Risks/Benefits were discussed, including, but not limited to, the possibility of new or different pain, bleeding or infection.   All questions were answered.  The patient understood and accepted risks.  Consent was verfied.  A time-out was taken to identify patient and procedure prior to starting the procedure.  With the patient laying in a prone position with the neck in a mid-flexed forward position, the area was prepped and draped in the usual sterile fashion using ChloraPrep and a fenestrated drape.  The area was determined under AP fluoroscopic guidance.  The skin and subcutaneous tissues overlying the targeted interspace were anesthetized with 3-5 mL of 1% Lidocaine using a 25G 1.5" needle.  A 20G, 3.5" Tuohy epidural needle was inserted through the anesthetized skin and directed toward the interspace under fluoroscopic guidance until T1 lamina was contacted.  The fluoroscope was then adjusted to yield a contralateral oblique view of the C7-T1 interspace.  The epidural needle was incrementally walked cephalad off of the lamina until the ligamentum flavum was engaged. From this point, a loss-of-resistance technique was used to identify entrance of the needle into the epidural space.  Once the tip of the needle was in the desired position, the contrast dye Omnipaque was injected to determine " placement and no vascular uptake.  Then, after negative aspiration, dexamethasone 15 mg + 3.5 cc NS was injected.  The needle was flushed with normal saline and removed. The contrast was seen to be displaced after injection. Patient was awake/responsive during all injections.  The patient tolerated the procedure well and was transferred to the [facility] in stable condition.  The patient was monitored after the procedure and was given post-procedure and discharge instructions to follow at home. The patient was discharged in a stable condition.

## 2022-06-13 VITALS
TEMPERATURE: 98 F | OXYGEN SATURATION: 99 % | SYSTOLIC BLOOD PRESSURE: 149 MMHG | RESPIRATION RATE: 18 BRPM | DIASTOLIC BLOOD PRESSURE: 65 MMHG | HEART RATE: 79 BPM

## 2022-06-16 LAB
FINAL PATHOLOGIC DIAGNOSIS: NORMAL
Lab: NORMAL

## 2022-06-24 ENCOUNTER — OFFICE VISIT (OUTPATIENT)
Dept: PAIN MEDICINE | Facility: CLINIC | Age: 67
End: 2022-06-24
Payer: MEDICARE

## 2022-06-24 VITALS
RESPIRATION RATE: 16 BRPM | SYSTOLIC BLOOD PRESSURE: 158 MMHG | WEIGHT: 217.25 LBS | BODY MASS INDEX: 34.1 KG/M2 | HEART RATE: 65 BPM | HEIGHT: 67 IN | DIASTOLIC BLOOD PRESSURE: 73 MMHG

## 2022-06-24 DIAGNOSIS — M54.12 CERVICAL RADICULOPATHY: Primary | ICD-10-CM

## 2022-06-24 DIAGNOSIS — M47.812 CERVICAL SPONDYLOSIS: ICD-10-CM

## 2022-06-24 DIAGNOSIS — M54.2 NECK PAIN: ICD-10-CM

## 2022-06-24 PROCEDURE — 99214 OFFICE O/P EST MOD 30 MIN: CPT | Mod: S$GLB,,,

## 2022-06-24 PROCEDURE — 99999 PR PBB SHADOW E&M-EST. PATIENT-LVL V: CPT | Mod: PBBFAC,,,

## 2022-06-24 PROCEDURE — 99214 PR OFFICE/OUTPT VISIT, EST, LEVL IV, 30-39 MIN: ICD-10-PCS | Mod: S$GLB,,,

## 2022-06-24 PROCEDURE — 99999 PR PBB SHADOW E&M-EST. PATIENT-LVL V: ICD-10-PCS | Mod: PBBFAC,,,

## 2022-06-24 NOTE — PROGRESS NOTES
[facility] Follow Up Evaluation    Referred by: Dr. Skinner  Reason for referral: neck pain    CC:   Chief Complaint   Patient presents with    Neck Pain     Injection f/u; no pain relief      Last 3 PDI Scores 6/24/2022 10/8/2021   Pain Disability Index (PDI) 37 31       Interval HPI 6/24/2022: Aye Montanez returns to the clinic for follow up. She is s/p cervical SONYA on 6/10/2022 with no relief of her pain.  She continues to have neck pain with radiation into bilateral shoulders and on the right side of her neck, constant, 6/10, often feels like she cannot hold up the weight of her neck.  She reports intermittent numbness in the left arm.  She denies any weakness or changes with her bowel or bladder function.  She continues to do at home exercises that was recommended by her son-in-law who is a physical therapist. She has a history of cerebrovascular accident and is on chronic anticoagulation.     Pain Intervention History:  - s/p cervical SONYA on 6/10/2022 with no relief of her pain.       HPI:   Aye Montanez is a 67 y.o. female who complains of neck pain    Onset: about 10 months  Progression: since onset, pain is unchanged  Typical Range: 6-10/10  Timing: constant  Quality: aching, numb, burning, tingling  Radiation: yes, down both arms  Associated numbness or weakness: yes numbness, yes weakness    Exacerbated by: lifting  Allievated by: rest, laying  Is Pain Level Acceptable?: No    Previous Therapies:  PT/OT:   HEP:   Interventions:   Surgery:  Medications:   - NSAIDS:   - MSK Relaxants:   - TCAs:   - SNRIs:   - Topicals:   - Anticonvulsants:  - Opioids:     History:    Current Outpatient Medications:     acetaminophen (TYLENOL) 500 MG tablet, Take 1,000 mg by mouth every 6 (six) hours as needed for Pain., Disp: , Rfl:     belimumab (BENLYSTA) 200 mg/mL Syrg, Inject 1 mL (200 mg total) into the skin once a week., Disp: 4 mL, Rfl: 11    clopidogreL (PLAVIX) 75 mg tablet, Take 1 tablet (75  "mg total) by mouth once daily. Take with aspirin 81 mg for 21 days. Then, stop aspirin and take just clopidogrel thereafter., Disp: 90 tablet, Rfl: 3    fluorometholone 0.1% (FML) 0.1 % DrpS, , Disp: , Rfl:     fluticasone furoate-vilanteroL (BREO ELLIPTA) 100-25 mcg/dose diskus inhaler, Inhale 1 puff into the lungs once daily. Controller, Disp: 1 each, Rfl: 5    furosemide (LASIX) 20 MG tablet, Take 1 tablet (20 mg total) by mouth once daily., Disp: 90 tablet, Rfl: 3    hydrocortisone 2.5 % cream, Apply topically 2 (two) times daily., Disp: 1 Tube, Rfl: 1    levothyroxine (SYNTHROID) 25 MCG tablet, Take 1 tablet (25 mcg total) by mouth before breakfast., Disp: 30 tablet, Rfl: 11    magnesium oxide (MAG-OX) 400 mg (241.3 mg magnesium) tablet, Take 400 mg by mouth once daily., Disp: , Rfl:     multivitamin (THERAGRAN) per tablet, Take 1 tablet by mouth once daily., Disp: , Rfl:     omeprazole (PRILOSEC) 20 MG capsule, Take 1 capsule (20 mg total) by mouth once daily., Disp: 90 capsule, Rfl: 3    potassium chloride SA (K-DUR,KLOR-CON) 20 MEQ tablet, Take 1 tablet (20 mEq total) by mouth once daily., Disp: 90 tablet, Rfl: 3    rosuvastatin (CRESTOR) 40 MG Tab, Take 1 tablet (40 mg total) by mouth once daily., Disp: 90 tablet, Rfl: 3    syringe with needle 3 mL 22 gauge x 3/4" Syrg, 6 Syringes by Misc.(Non-Drug; Combo Route) route every 30 days. Use to inject cyanocobalamin once every 30 days., Disp: , Rfl:     zinc gluconate 50 mg tablet, Take 50 mg by mouth once daily., Disp: , Rfl:     albuterol (ACCUNEB) 1.25 mg/3 mL Nebu, Take 6 mLs (2.5 mg total) by nebulization every 6 (six) hours as needed. Rescue (Patient not taking: No sig reported), Disp: 3 each, Rfl: 3    benzonatate (TESSALON) 100 MG capsule, 1 - 2 po every 6 hours prn cough (Patient not taking: No sig reported), Disp: 45 capsule, Rfl: 0    cyanocobalamin (VITAMIN B-12) 1,000 mcg/mL injection, Inject 1 mL (1,000 mcg total) into the skin every " 30 days. (Patient not taking: No sig reported), Disp: 3 mL, Rfl: 3    ibuprofen (ADVIL,MOTRIN) 600 MG tablet, Take 1 tablet (600 mg total) by mouth every 6 (six) hours as needed for Pain. (Patient not taking: No sig reported), Disp: 20 tablet, Rfl: 0    montelukast (SINGULAIR) 10 mg tablet, TAKE 1 TABLET EVERY EVENING (Patient not taking: No sig reported), Disp: 90 tablet, Rfl: 3    mycophenolate (CELLCEPT) 500 mg Tab, Take 1 tablet (500 mg total) by mouth 2 (two) times daily. (Patient not taking: No sig reported), Disp: 60 tablet, Rfl: 3    Past Medical History:   Diagnosis Date    Sjogren's syndrome 12/05/2016    Systemic lupus erythematosus, organ or system involvement unspecified        Past Surgical History:   Procedure Laterality Date    APPENDECTOMY      CHOLECYSTECTOMY      COLONOSCOPY      COLONOSCOPY N/A 6/9/2022    Procedure: COLONOSCOPY;  Surgeon: Eugene Del Castillo MD;  Location: [facility] ENDO;  Service: Endoscopy;  Laterality: N/A;    CORONARY STENT PLACEMENT      10/2017    EPIDURAL STEROID INJECTION INTO CERVICAL SPINE N/A 6/10/2022    Procedure: Injection-steroid-epidural-cervical;  Surgeon: Dickson Oshea MD;  Location: [facility] OR;  Service: Pain Management;  Laterality: N/A;    ESOPHAGOGASTRODUODENOSCOPY N/A 6/9/2022    Procedure: EGD (ESOPHAGOGASTRODUODENOSCOPY);  Surgeon: Eugene Del Castillo MD;  Location: [facility] ENDO;  Service: Endoscopy;  Laterality: N/A;    FRACTURE SURGERY Right     foot    HYSTERECTOMY      1984    KNEE ARTHROSCOPY Right 2/2016    OOPHORECTOMY Bilateral     hyst. 1984    TOTAL REDUCTION MAMMOPLASTY Bilateral     1999    UPPER GASTROINTESTINAL ENDOSCOPY         Family History   Problem Relation Age of Onset    Cancer Mother     Ovarian cancer Mother 40    Cancer Father         esophageal    Esophageal cancer Father     Stroke Maternal Aunt     Rheum arthritis Maternal Aunt     Rheum arthritis Paternal Uncle     Breast cancer Maternal Cousin     Breast  "cancer Maternal Cousin     Glaucoma Neg Hx     Macular degeneration Neg Hx        Social History     Socioeconomic History    Marital status:    Tobacco Use    Smoking status: Never Smoker    Smokeless tobacco: Never Used   Substance and Sexual Activity    Alcohol use: Yes     Comment: rarely    Drug use: No    Sexual activity: Yes     Partners: Male       Review of patient's allergies indicates:   Allergen Reactions    Ceftin [cefuroxime axetil] Itching    Gabapentin      Felt like "no blood flow to my legs"    Metoprolol      Leg pain    Versed [midazolam]      "wants to come out of my skin - I get hyped"       Review of Systems:  General ROS: negative for - fever  Psychological ROS: negative for - hostility  Hematological and Lymphatic ROS: negative for - bleeding problems  Endocrine ROS: negative for - unexpected weight changes  Respiratory ROS: no cough, shortness of breath, or wheezing  Cardiovascular ROS: no chest pain or dyspnea on exertion  Gastrointestinal ROS: no abdominal pain, change in bowel habits, or black or bloody stools  Musculoskeletal ROS: positive for - muscular weakness  Neurological ROS: positive for - numbness/tingling  Dermatological ROS: negative for rash    Physical Exam:  Vitals:    06/24/22 1327   BP: (!) 158/73   Pulse: 65   Resp: 16   Weight: 98.5 kg (217 lb 4.2 oz)   Height: 5' 7" (1.702 m)   PainSc:   6   PainLoc: Neck     Body mass index is 34.03 kg/m².     Gen: NAD  Psych: mood appropriate for given condition  CV: 2+ radial pulse  HEENT: anicteric   Respiratory: non labored  Abd: soft nt, nd  Skin: intact  Sensation: intact to lt touch bilaterally in c4-t1   Reflexes: 2+ b/l Bicep, tricep, BR and patella Luna positive on the left  ROM: Cervical ROM full, shoulder, elbow and wrist ROM full  Tone:  Normal at elbow, wrist and shoulder   Inspection: no atrophy of bicep, FDI or APB noted  Palpation: tender cervical paraspinals, levator scapula and " trapezius    Motor:    Right Left   C4 Shoulder Abduction  5  5   C5 Elbow Flexion    5  5   C6 Wrist Extension  5  5   C7 Elbow Extension   5  5   C8/T1 Hand Intrinsics   5  5                   Imaging:  MRI cervical spine 7/15/21  FINDINGS:  Straightening of normal cervical lordosis.  Dextrocurvature.  No spondylolisthesis.  No acute fracture with preserved vertebral body heights.  No marrow replacement.  Multilevel disc desiccation.  Mild disc height loss at C4-5 through C6-7.  Cervical cord normal in signal.  Slight ventral cord flattening focally at C3-4 and C5-6.  There is a partially imaged T2 hyperintense lesion along the right C6-7 neural foramen which measures at least 5 mm.    At C2-3 there is bilateral facet degenerative changes especially on the left.  Partial effacement ventral CSF space.  Moderate left neural foraminal narrowing.  At C3-4 minimal posterior disc bulge.  Bilateral uncovertebral spurs.  Bilateral facet degenerative changes.  Complete effacement ventral and partial effacement dorsal CSF space.  Moderate right neural foraminal narrowing.  At C4-5 posterior disc osteophyte complex and bilateral uncovertebral spurs and left facet degenerative change.  Partial effacement ventral and dorsal CSF space.  Mild right and severe left neural foraminal narrowing.  At C5-6 posterior disc osteophyte complex asymmetric to the right and bilateral uncovertebral spurs.  Complete effacement ventral CSF space and partial effacement dorsal CSF space.  At C6-7 posterior disc osteophyte complex asymmetric to the right and bilateral uncovertebral spurs.  Near complete effacement ventral CSF space.  Mild right neural foraminal narrowing.  At T3-4 and T4-5 there is right facet degenerative change, seen only on sagittal.    EMG/NCS 7/28/21  Impression: This is a slightly abnormal EMG of the left upper extremity.  There is electrophysiologic evidence most consistent with a very mild, left, C6 radiculopathy without  active denervation.  There is no evidence of any other radiculopathy, focal neuropathy, peripheral neuropathy, or plexopathy on this study.  The patient's symptoms of intermittent sensory changes could be secondary to intermittent compression not resulting in demyelination or axonal nerve injury    Labs:  BMP  Lab Results   Component Value Date     04/27/2022    K 4.1 04/27/2022     04/27/2022    CO2 28 04/27/2022    BUN 17 04/27/2022    CREATININE 0.75 04/27/2022    CALCIUM 9.2 04/27/2022    ANIONGAP 5 (L) 04/27/2022    ESTGFRAFRICA >60 04/27/2022    EGFRNONAA >60 04/27/2022     Lab Results   Component Value Date    ALT 16 04/27/2022    AST 29 04/27/2022    ALKPHOS 97 04/27/2022    BILITOT 0.4 04/27/2022       Assessment:   Problem List Items Addressed This Visit        Orthopedic    Neck pain      Other Visit Diagnoses     Cervical radiculopathy    -  Primary    Relevant Orders    Ambulatory referral/consult to Physical/Occupational Therapy    Ambulatory referral/consult to Neurosurgery    Cervical spondylosis              67 y.o. year old female with PMH CAD, h/o CVA on plavix, HTN, presents to the office with neck pain.  She's had this pain for about the past year.  Today her pain is 6/10, constant, aching, burning, numb, radiating down both of her arms.  She reports numbness and weakness.  Denies any bowel/bladder dysfunction    6/24/2022: Aye Montanez returns to the clinic for follow up. She is s/p cervical SONYA on 6/10/2022 with no relief of her pain.  She continues to have neck pain with radiation into bilateral shoulders and on the right side of her neck, constant, 6/10, often feels like she cannot hold up the weight of her neck.  She reports intermittent numbness in the left arm.  She denies any weakness or changes with her bowel or bladder function.  She continues to do at home exercises that was recommended by her son-in-law who is a physical therapist. She has a history of cerebrovascular  accident and is on chronic anticoagulation.     - on exam she has 5-/5 left shoulder abduction, intact sensation to light touch b/l C4-T1, 2+ b/l biceps, triceps, and patella, + judge's on the left  - She is s/p cervical SONYA on 6/10/2022 with no relief of her pain.  - we went over her cervical MRI in clinic and I independently reviewed her cervical MRI and it is c/w C3-4 complete effacement ventral and partial effacement dorsal CSF space with moderate right neural foraminal narrowing, C4-5 mild right and severe left neural foraminal narrowing  - her pain continues to limit her mobility and interfere with her ADLs in addition to limiting her quality of life.  - due to no relief from cervical epidural will refer her to Neurosurgery for further evaluation.  - she has been doing at home exercises provided by her son-in-law who is a physical therapist with no significant relief.  - I placed orders for formal physical therapy for potential added benefit via actual in person therapy with therapist.  - additionally I provided her with a trial of Lyrica, she previously did not want take this medication due to the potential side effects with Lexapro, however she has discontinued taking that medication.  - she is on chronic anticoagulation due to multiple CVA/TIA's  - follow up in 5 weeks to see how she is tolerating physical therapy and the trial Lyrica      : Reviewed     The above note was completed, in part, with the aid of Dragon dictation software/hardware. Translation errors may be present.

## 2022-06-29 ENCOUNTER — OFFICE VISIT (OUTPATIENT)
Dept: HOME HEALTH SERVICES | Facility: CLINIC | Age: 67
End: 2022-06-29
Payer: MEDICARE

## 2022-06-29 ENCOUNTER — PATIENT MESSAGE (OUTPATIENT)
Dept: PAIN MEDICINE | Facility: CLINIC | Age: 67
End: 2022-06-29
Payer: MEDICARE

## 2022-06-29 VITALS
HEART RATE: 66 BPM | WEIGHT: 211 LBS | SYSTOLIC BLOOD PRESSURE: 106 MMHG | DIASTOLIC BLOOD PRESSURE: 65 MMHG | HEIGHT: 67 IN | BODY MASS INDEX: 33.12 KG/M2

## 2022-06-29 DIAGNOSIS — E66.09 CLASS 1 OBESITY DUE TO EXCESS CALORIES WITH SERIOUS COMORBIDITY AND BODY MASS INDEX (BMI) OF 32.0 TO 32.9 IN ADULT: ICD-10-CM

## 2022-06-29 DIAGNOSIS — M35.00 SJOGREN'S SYNDROME WITHOUT EXTRAGLANDULAR INVOLVEMENT: ICD-10-CM

## 2022-06-29 DIAGNOSIS — I25.84 CORONARY ARTERY DISEASE DUE TO CALCIFIED CORONARY LESION: ICD-10-CM

## 2022-06-29 DIAGNOSIS — Z00.00 ENCOUNTER FOR PREVENTIVE HEALTH EXAMINATION: Primary | ICD-10-CM

## 2022-06-29 DIAGNOSIS — I70.0 ATHEROSCLEROSIS OF AORTA: ICD-10-CM

## 2022-06-29 DIAGNOSIS — K21.9 GASTROESOPHAGEAL REFLUX DISEASE WITHOUT ESOPHAGITIS: ICD-10-CM

## 2022-06-29 DIAGNOSIS — M32.13 OTHER SYSTEMIC LUPUS ERYTHEMATOSUS WITH LUNG INVOLVEMENT: ICD-10-CM

## 2022-06-29 DIAGNOSIS — I50.32 CHRONIC DIASTOLIC CONGESTIVE HEART FAILURE: ICD-10-CM

## 2022-06-29 DIAGNOSIS — E03.9 ACQUIRED HYPOTHYROIDISM: ICD-10-CM

## 2022-06-29 DIAGNOSIS — I25.10 CORONARY ARTERY DISEASE DUE TO CALCIFIED CORONARY LESION: ICD-10-CM

## 2022-06-29 DIAGNOSIS — Z86.73 HISTORY OF CVA (CEREBROVASCULAR ACCIDENT): ICD-10-CM

## 2022-06-29 DIAGNOSIS — R77.1 HYPERGLOBULINEMIA: ICD-10-CM

## 2022-06-29 DIAGNOSIS — M54.12 CERVICAL RADICULOPATHY: Primary | ICD-10-CM

## 2022-06-29 DIAGNOSIS — E78.00 HYPERCHOLESTEROLEMIA: ICD-10-CM

## 2022-06-29 PROCEDURE — G0439 PPPS, SUBSEQ VISIT: HCPCS | Mod: S$GLB,,, | Performed by: NURSE PRACTITIONER

## 2022-06-29 PROCEDURE — G0439 PR MEDICARE ANNUAL WELLNESS SUBSEQUENT VISIT: ICD-10-PCS | Mod: S$GLB,,, | Performed by: NURSE PRACTITIONER

## 2022-06-29 RX ORDER — PREGABALIN 50 MG/1
50 CAPSULE ORAL 2 TIMES DAILY
Qty: 60 CAPSULE | Refills: 1 | Status: SHIPPED | OUTPATIENT
Start: 2022-06-29 | End: 2022-08-02

## 2022-06-29 NOTE — TELEPHONE ENCOUNTER
Yes, medication has been sent to Dr. Oshea for approval. Follow up in 6 weeks to see how she is tolerating the medication.

## 2022-06-30 ENCOUNTER — TELEPHONE (OUTPATIENT)
Dept: GASTROENTEROLOGY | Facility: CLINIC | Age: 67
End: 2022-06-30
Payer: MEDICARE

## 2022-06-30 NOTE — TELEPHONE ENCOUNTER
----- Message from Eugene Del Castillo MD sent at 6/30/2022  8:58 AM CDT -----  Please make f/u appt with Jo

## 2022-07-04 NOTE — PATIENT INSTRUCTIONS
Counseling and Referral of Other Preventative  (Italic type indicates deductible and co-insurance are waived)    Patient Name: Aye Montanez  Today's Date: 7/4/2022    Health Maintenance       Date Due Completion Date    COVID-19 Vaccine (3 - Booster for Pfizer series) 08/18/2021 3/18/2021    Influenza Vaccine (1) 09/01/2022 9/29/2021    TETANUS VACCINE 09/12/2022 9/12/2012 (Done)    Override on 9/12/2012: Done    High Dose Statin 06/29/2023 6/29/2022    Aspirin/Antiplatelet Therapy 06/29/2023 6/29/2022    Mammogram 09/14/2023 9/14/2021    DEXA Scan 09/14/2024 9/14/2021    Lipid Panel 04/27/2027 4/27/2022    Colorectal Cancer Screening 06/09/2027 6/9/2022    Override on 5/12/2016: Done        No orders of the defined types were placed in this encounter.    The following information is provided to all patients.  This information is to help you find resources for any of the problems found today that may be affecting your health:                Living healthy guide: www.[facility].[place].gov      Understanding Diabetes: www.diabetes.org      Eating healthy: www.cdc.gov/healthyweight      CDC home safety checklist: www.cdc.gov/steadi/patient.html      Agency on Aging: www.goea.[place].[facility]      Alcoholics anonymous (AA): www.aa.org      Physical Activity: www.kathleen.nih.gov/gn8fjuj      Tobacco use: www.quitwithusla.org

## 2022-07-04 NOTE — PROGRESS NOTES
"  Aye Montanez presented for a  Medicare AWV and comprehensive Health Risk Assessment today. The following components were reviewed and updated:    · Medical history  · Family History  · Social history  · Allergies and Current Medications  · Health Risk Assessment  · Health Maintenance  · Care Team         ** See Completed Assessments for Annual Wellness Visit within the encounter summary.**         The following assessments were completed:  · Living Situation  · CAGE  · Depression Screening  · Timed Get Up and Go  · Whisper Test  · Cognitive Function Screening  · Nutrition Screening  · ADL Screening  · PAQ Screening        Vitals:    06/29/22 1213   BP: 106/65   Pulse: 66   Weight: 95.7 kg (211 lb)   Height: 5' 7" (1.702 m)     Body mass index is 33.05 kg/m².  Physical Exam  Constitutional:       Appearance: Normal appearance.   HENT:      Head: Normocephalic and atraumatic.      Nose: Nose normal.   Eyes:      Extraocular Movements: Extraocular movements intact.      Pupils: Pupils are equal, round, and reactive to light.   Cardiovascular:      Rate and Rhythm: Normal rate and regular rhythm.      Pulses: Normal pulses.      Heart sounds: Normal heart sounds.   Pulmonary:      Effort: Pulmonary effort is normal.      Breath sounds: Normal breath sounds.   Musculoskeletal:      Cervical back: Normal range of motion and neck supple.   Neurological:      General: No focal deficit present.      Mental Status: She is alert and oriented to person, place, and time.               Diagnoses and health risks identified today and associated recommendations/orders:    1. Encounter for preventive health examination  awv completed      2. Atherosclerosis of aorta, seen on CT  Chronic and stable. Continue current treatment. Follow with PCP.  On meds    3. Coronary artery disease, s/p stenting LAD x 2 10/2017  Chronic and stable. Continue current treatment. Follow with PCP.  Follows with cardiology    4. Chronic diastolic " congestive heart failure  Chronic and stable. Continue current treatment. Follow with PCP.  On lasix monitoring weights    5. Sjogren's syndrome without extraglandular involvement  Chronic and stable. Continue current treatment. Follow with PCP.  Follows with rheumatology on meds     6. Hypercholesterolemia  Chronic and stable. Continue current treatment. Follow with PCP.  On statin     7. Other systemic lupus erythematosus with lung involvement  Chronic and stable. Continue current treatment. Follow with PCP and rheumatology    8. History of CVA (cerebrovascular accident) S/P TPA  Chronic and stable. Continue current treatment. Follow with PCP.  plavix    9. Acquired hypothyroidism  Chronic and stable. Continue current treatment. Follow with PCP.  On replacement       10. Class 1 obesity due to excess calories with serious comorbidity and body mass index (BMI) of 32.0 to 32.9 in adult  Chronic and stable. Continue current treatment. Follow with PCP.      11. Gastroesophageal reflux disease without esophagitis  Chronic and stable. Continue current treatment. Follow with PCP.  On PPI    12. Hyperglobulinemia  Chronic and stable. Continue current treatment. Follow with PCP.        Provided Aye with a 5-10 year written screening schedule and personal prevention plan. Recommendations were developed using the USPSTF age appropriate recommendations. Education, counseling, and referrals were provided as needed. After Visit Summary printed and given to patient which includes a list of additional screenings\tests needed.    Fu in 1 yr for CELINE Winter NP  I offered to discuss advanced care planning, including how to pick a person who would make decisions for you if you were unable to make them for yourself, called a health care power of , and what kind of decisions you might make such as use of life sustaining treatments such as ventilators and tube feeding when faced with a life limiting  illness recorded on a living will that they will need to know. (How you want to be cared for as you near the end of your natural life)     X  Patient has advanced directives on file, which we reviewed, and they do not wish to make changes.

## 2022-07-07 ENCOUNTER — CLINICAL SUPPORT (OUTPATIENT)
Dept: REHABILITATION | Facility: HOSPITAL | Age: 67
End: 2022-07-07
Payer: MEDICARE

## 2022-07-07 DIAGNOSIS — M54.12 CERVICAL RADICULOPATHY: ICD-10-CM

## 2022-07-07 PROCEDURE — 97162 PT EVAL MOD COMPLEX 30 MIN: CPT | Mod: PO

## 2022-07-07 PROCEDURE — 97110 THERAPEUTIC EXERCISES: CPT | Mod: PO

## 2022-07-07 PROCEDURE — 97140 MANUAL THERAPY 1/> REGIONS: CPT | Mod: PO

## 2022-07-07 NOTE — PLAN OF CARE
"[facility]   Physical Therapy Initial Evaluation     Date: 7/7/2022   Name: Aye Montanez  Clinic Number: [id number]    Therapy Diagnosis:   Encounter Diagnosis   Name Primary?    Cervical radiculopathy      Physician: Walter Ruffin PA-C    Physician Orders: PT Eval and Treat   Medical Diagnosis from Referral: M54.12 (ICD-10-CM) - Cervical radiculopathy  Evaluation Date: 7/7/2022  Authorization Period Expiration: 12/31/2022  Plan of Care Expiration: 09/07/2022  Progress Note Due: 6th visit or 08/07/2022  Visit # / Visits authorized: 1/20  FOTO: Completed on 07/07/2022     Precautions: Standard and Fall    Time In: 1500  Time Out: 1600  Total Appointment Time (timed & untimed codes): 60 minutes      SUBJECTIVE   Date of onset: >1 year, increasing. Status post Cervical SONYA on 06/10/2022 with some relief.    History of current condition - Sarah reports:     Her main complaint is that her head feels heavy all the time. She has pain and stiffness with movement in all directions. She has chronic numbness and tingling into the left hand as well as weakness and increased fatigue with normal activity. Sometimes also numbness/tingling from her neck into her jaw and around her ears. She also gets radiating pain into her left shoulder blade with random "zings" of pain up and down her neck.     Her neck and shoulders are so sensitive to pressure that she can no longer wear normal bras. She switched to sports bras and keeps the straps off her shoulders.    She likes to crotchet and cook, but is limited to about 45-60 minutes before her neck and LUE become limiting.    Sleep is disturbed more often. If she falls asleep on her left side, her forearm and hand will be numb.     She has regular headaches described as aching/throbbing that start in the base of her skull and wrap over the top into her temples.    Patient has history of TIAs in 07/2021 over the course of two months; now on Plavix. She has " "balance issues and has fallen multiple times with no warning, usually because she stumbles or "just loses it". She does not think she is catching her feet and denies vertigo, lightheadedness, or pain in lower extremities. She denies injury, but has stopped walking and riding her bike for exercise.     She does some arm and leg exercises with bands, enjoys hula hoops, and gardening.    Prior Therapy: None  Social History: Lives with spouse  Occupation: Retired  Prior Level of Function: Walking, riding bike for exercise  Current Level of Function: Avoiding community ambulation, increased headaches, limited cooking/crotchet    Pain:  Current 6/10, worst 9/10, best 4/10   Location: left arms, neck  and upper back   Description: Aching, Throbbing, Tight, Tingling, Numb, Sharp and Variable  Aggravating Factors: Touching, Night Time, Morning and Getting out of bed/chair  Easing Factors: pain medication, ice, heating pad and injections    Red Flag Screening:   Cough  Sneeze  Strain: (--)  Bladder/ bowel: (--)  Falls: (+)  Night pain: (--)  Unexplained weight loss: (--)  General health: Fair    Patients goals: Decrease pain, improve balance, return to walking/biking for exercise     Imaging, No recent imaging for neck    Medical History:   Past Medical History:   Diagnosis Date    Sjogren's syndrome 12/05/2016    Systemic lupus erythematosus, organ or system involvement unspecified        Surgical History:   Aye Montanez  has a past surgical history that includes Cholecystectomy; Knee arthroscopy (Right, 2/2016); Total Reduction Mammoplasty (Bilateral); Hysterectomy; Oophorectomy (Bilateral); Fracture surgery (Right); Coronary stent placement; Appendectomy; Upper gastrointestinal endoscopy; Colonoscopy; Epidural steroid injection into cervical spine (N/A, 6/10/2022); Esophagogastroduodenoscopy (N/A, 6/9/2022); and Colonoscopy (N/A, 6/9/2022).    Medications:   Aye has a current medication list which includes the " "following prescription(s): acetaminophen, benlysta, clopidogrel, fluorometholone 0.1%, furosemide, hydrocortisone, levothyroxine, magnesium oxide, multivitamin, omeprazole, potassium chloride sa, pregabalin, rosuvastatin, syringe with needle, and zinc gluconate.    Allergies:   Review of patient's allergies indicates:   Allergen Reactions    Ceftin [cefuroxime axetil] Itching    Gabapentin      Felt like "no blood flow to my legs"    Metoprolol      Leg pain    [id number] antimigraine agents      Was told never to take this medication due to vascular issues - Per Neurologist     Versed [midazolam]      "wants to come out of my skin - I get hyped"          OBJECTIVE       **At end of session, while exiting treatment room, patient has sudden LOB, resulting in falling to her knees on the floor. She was able to stand without assistance and denied injury. She denied dizziness, lightheadedness, vertigo, pain prior to fall. She stated "this happens all the time and I don't know why". She refused vitals assessment or further triage. She ambulated from clinic independently.**    Forward head, flat thoracic. Supports head with bed mobility.    Functional Screen:     SFMA FN: functional, nonpainful. FP: functional, painful. DP: dysfunctional, painful. DN: dysfunctional, nonpainful.   AROM cervical flexion Dysfunctional, painful   AROM cervical extension Dysfunctional, painful hinging at C5/C6   AROM cervical rotation R: Dysfunctional, painful  L: Dysfunctional, painful   AROM Shoulder ER R: Dysfunctional, non-painful  L: Dysfunctional, non-painful   AROM Shoulder IR R: Dysfunctional, non-painful  L: Dysfunctional, non-painful   multi-segmental flexion  Functional, non-painful   multi-segmental extension Dysfunctional, painful   multi-segmental rotation  R: Dysfunctional, painful  L: Dysfunctional, painful   SLS R: Dysfunctional, non-painful  L: Dysfunctional, non-painful   Arms Down Deep Squat Dysfunctional, " non-painful         Myotome Peripheral Right Left   C4 (Shoulder Elevation) Dorsal Scapular positive - painful positive - painful     C5 (Shoulder ABduction) Axillary negative   negative     C5 (Shoulder ER) Suprascapular  negative   positive     C6 (Shoulder IR) Subscapularis  negative   positive     C6 (Wrist extension) Radial negative   positive     C7 (Wrist flexion) Ulnar negative   positive     C7 (Elbow extension) Radial negative   positive     C8 (Thumb ABduction) Radial negative   positive     C8 (Opposition) Median negative   positive     T1 (1st/2nd MTP ADduction Median negative   positive     T1 (4th/5th MTP ADduction, Ulnar nerve) Ulnar positive   positive        Seated Thoracic Range of Motion:    % Pain   Right Rotation 60 (+)   Left Rotation 50 (+)     Upper Extremity Range of Motion (deg)  (R) UE AROM/PROM (L) UE AROM/PROM   Shoulder flexion: 150/160 Shoulder flexion: 150/160   Shoulder extension: 40/50 Shoulder extension: 40/50   Shoulder Abduction: 150/160 Shoulder abduction: 150/160   Shoulder ER T1/90 Shoulder ER T1/90   Shoulder IR L5/20 Shoulder IR L5/20     Upper Extremity Strength  (R) UE  (L) UE    Shoulder flexion: 4+/5, compensating with elevation Shoulder flexion: 4/5, compensating with elevation   Shoulder extension: 4-/5 Shoulder extension: 4-/5   Shoulder Abduction: 4/5 Shoulder abduction: 4-/5   Shoulder ER 4+/5 Shoulder ER 4/5   Shoulder IR 4+/5 Shoulder IR 4/5   Elbow flexion: 5/5 Elbow flexion: 4+/5   Elbow extension: 4+/5 Elbow extension: 4/5   Scapular retraction: 4-/5 Scapular retraction: 3+/5   Scapular protraction: 4/5 Scapular Protraction: 4-/5     Special Tests (cervical):  Compression (+)   Distraction (+)   Spurlings (+) referral to medial left scapula   Lateral Flexion Alar Ligament (-)   DNF test <5 sec       Reflexes:  Reflex Left Right   Bicep normal normal   Tricep normal normal   Luna Normal (documented as + on 06/24/2022 so will continue to monitor) normal      Upper Limb Neurodynamic testing:  Radial: (+) left  Median: (+) left   Ulnar: (+) left     Joint Mobility: *  Cervical: HYPOmobile, Focal pain and Referred pain reproduction of headches symptoms with C2/C3 PA, referral to medial scapula with C5 PA  Thoracic: HYPOmobile and Focal pain      Scapular: HYPOmobile     Palpation: Tender to upper cervical spine palpation. Tender to lower cervical spine palpation.    Sensation: Decreased over ulnar nerve distribution on left     Limitation/Restriction for FOTO Cervical Survey    Therapist reviewed FOTO scores for Aye Montanez on 7/7/2022.   FOTO documents entered into [employer] - see Media section.    Limitation Score: 55%         TREATMENT     Total Treatment time (time-based codes) separate from Evaluation: 30 minutes     Sarah received the treatments listed below:      To add as tolerated:  Cervical SNAGs  Deep neck flexor endurance holds (start at 10x5 sec)  Cat/cow  Prone shoulder extensions, T  Radial, ulnar, medial nerve glides      Sarah received therapeutic exercises to develop strength, ROM, posture and core stabilization for 15 minutes including:  Supine chin tucks 2x15  Hook lying posterior pelvic tilts 2x15  Supine scapular retraction 2x15  Seated scapular retraction x10    Sarah received the following manual therapy techniques: Joint mobilizations, Manual traction, Myofacial release and Soft tissue Mobilization were applied to the: cervical spine for 15 minutes, including:  Cervical traction  Grade III/IV cervical PA mobilization  Soft tissue mobilization, cross friction massage cervical paravertebral muscles, lats, subscapularis, serratus anterior      PATIENT EDUCATION AND HOME EXERCISES     Education provided:   - Presentation, prognosis, plan of care, HEP     Written Home Exercises Provided: yes. Exercises were reviewed and Sarah was able to demonstrate them prior to the end of the session.  Sarah demonstrated good  understanding of the education provided. See EMR  under Patient Instructions for exercises provided during therapy sessions.    Images copyright of [email or web address]    ASSESSMENT     Aye is a 67 y.o. female referred to outpatient Physical Therapy with a medical diagnosis of cervical radiculopathy. Patient presents with altered balance, decreased motor control, decreased endurance of cervical/postural stabilizers, decreased strength, altered sensation/myotomes, decreased joint mobility. These impairments are limiting her functional neck motions, transfers, bed mobility, and gait. As a result she has increased difficulty with her normal home and recreational asctivities and is at an increased risk for falls.    Patient prognosis is Good.     Patient will benefit from skilled outpatient Physical Therapy to address the deficits stated above and in the chart below, provide patient /family education, and to maximize patient's level of independence.     Plan of care discussed with patient: Yes  Patient's spiritual, cultural and educational needs considered and patient is agreeable to the plan of care and goals as stated below:     Anticipated Barriers for therapy: None    Medical Necessity is demonstrated by the following  History  Co-morbidities and personal factors that may impact the plan of care Co-morbidities:   difficulty sleeping, history of CVA and SLE    Personal Factors:   coping style     moderate   Examination  Body Structures and Functions, activity limitations and participation restrictions that may impact the plan of care Body Regions:   head  neck  upper extremities    Body Systems:    gross symmetry  ROM  strength  gross coordinated movement  balance  gait  transfers  motor control    Participation Restrictions:   None    Activity limitations:   Learning and applying knowledge  no deficits    General Tasks and Commands  no deficits    Communication  no deficits    Mobility  lifting and carrying objects  fine hand use  (grasping/picking up)  walking  driving (bike, car, motorcycle)    Self care  no deficits    Domestic Life  cooking  doing house work (cleaning house, washing dishes, laundry)  crotchet    Interactions/Relationships  no deficits    Life Areas  no deficits    Community and Social Life  community life  recreation and leisure         moderate   Clinical Presentation evolving clinical presentation with changing clinical characteristics moderate   Decision Making/ Complexity Score: moderate     Goals:  Short Term Goals: 4 weeks   Patient will be independent with HEP for symptom management  Patient will increase range of motion >5 deg in cervical/thoracic spine in all affected planes to improve functional mobility  Patient will increase strength of BUE by at least 1/2 grade via MMT testing to allow for improved performance of ADLs and daily functional tasks  Patient will demonstrate single leg balance >10 sec B     Long Term Goals: 8 weeks   Patient will be independent with progressive HEP for continued strengthening to support return to previous level of function  Patient will have grossly symmetrical, pain-free range of motion for improved functional mobility  Patient will increase strength of BUE by at least 1 grade via MMT testing to allow for improved performance of ADLs and daily functional tasks  Patient will be able to walk >30 min  Patient will achieve <40% limitation as measured by the FOTO to demonstrate decreased functional disability  Patient will score >46/54 on Savage balance to indicate decreased fall risk    PLAN   Plan of care Certification: 7/7/2022 to 09/07/2022.    Savage Balance test, Tinetti, or FGA    Outpatient Physical Therapy 2 times weekly for 8 weeks to include the following interventions: Cervical/Lumbar Traction, Electrical Stimulation Dry needling, Gait Training, Manual Therapy, Moist Heat/ Ice, Neuromuscular Re-ed, Patient Education, Therapeutic Activities and Therapeutic Exercise.     Sami  EMELY Nair      I CERTIFY THE NEED FOR THESE SERVICES FURNISHED UNDER THIS PLAN OF TREATMENT AND WHILE UNDER MY CARE   Physician's comments:     Physician's Signature: ___________________________________________________

## 2022-07-11 ENCOUNTER — CLINICAL SUPPORT (OUTPATIENT)
Dept: REHABILITATION | Facility: HOSPITAL | Age: 67
End: 2022-07-11
Payer: MEDICARE

## 2022-07-11 DIAGNOSIS — M54.12 CERVICAL RADICULOPATHY: Primary | ICD-10-CM

## 2022-07-11 PROCEDURE — 97110 THERAPEUTIC EXERCISES: CPT | Mod: PO,CQ

## 2022-07-11 PROCEDURE — 97140 MANUAL THERAPY 1/> REGIONS: CPT | Mod: PO,CQ

## 2022-07-11 NOTE — PATIENT INSTRUCTIONS
Access Code: [email or web address]  URL: [email or web address]  Date: 07/10/2022  Prepared by: Sami Nair    Exercises  Supine Cervical Retraction with Towel - 1 x daily - 7 x weekly - 3 sets - 12 reps - 5 hold  Supine Neck Traction with Doorway - 1 x daily - 4 x weekly - 2-3 sets - 10-15 reps - 5 hold - 5/10 intensity  Supine Posterior Pelvic Tilt - 1 x daily - 4 x weekly - 2-3 sets - 10-15 reps - 5 hold - 5/10 intensity  Supine Scapular Retraction - 1 x daily - 4 x weekly - 2-3 sets - 10-15 reps - 5 hold - 5/10 intensity  Seated Scapular Retraction - 1 x daily - 4 x weekly - 2-3 sets - 10-15 reps - 5 hold - 5/10 intensity

## 2022-07-11 NOTE — PROGRESS NOTES
Physical Therapy Daily Treatment Note     Name: Aye Montanez  Clinic Number: [id number]    Therapy Diagnosis:   Encounter Diagnosis   Name Primary?    Cervical radiculopathy Yes     Physician: Walter Ruffin PA-C    Visit Date: 7/11/2022   Physician Orders: PT Eval and Treat   Medical Diagnosis from Referral: M54.12 (ICD-10-CM) - Cervical radiculopathy  Evaluation Date: 7/7/2022  Authorization Period Expiration: 12/31/2022  Plan of Care Expiration: 09/07/2022  Progress Note Due: 6th visit or 08/07/2022  Visit # / Visits authorized: 1/20  FOTO: Completed on 07/07/2022     Time In: 1525  Time Out: 1620  Total Billable Time: 55 minutes    Precautions: Standard and Fall    Subjective     Pt reports: she was very sore following last treatment session. Patient states she had headache following initial evaluation and this lasted for 2 days. She was compliant with home exercise program.  Response to previous treatment: soreness   Functional change: too soon to determine     Pain: 6/10  Location: bilateral neck and shoulder blade      Objective     Sarah received therapeutic exercises to develop strength, endurance, ROM, flexibility, posture and core stabilization for 30 minutes including:  Supine chin tucks 2x15  Hook lying posterior pelvic tilts 2x15  Supine scapular retraction 2x15  Seated scapular retraction x10  Seated bruegger 2x10  Seated cervical rotation (B) x 10  Seated rows (red TB) 2x10    To add in future sessions   Cervical SNAGs  Deep neck flexor endurance holds (start at 10x5 sec)  Cat/cow  Prone shoulder extensions, T  Radial, ulnar, medial nerve glides    Sarah received the following manual therapy techniques: Soft tissue Mobilization were applied to the: neck musculature for 25 minutes, including:  Cervical traction   Grade III/IV cervical PA mobilization (NP)  Soft tissue mobilization, cross friction massage cervical paravertebral muscles, lats, subscapularis, serratus anterior    Home Exercises  Provided and Patient Education Provided     Education provided:   - HEP compliance    Written Home Exercises Provided: Patient instructed to cont prior HEP.  Exercises were reviewed and Sarah was able to demonstrate them prior to the end of the session.  Sarah demonstrated good  understanding of the education provided.     See EMR under Patient Instructions for exercises provided 07/07/2022.    Assessment     (R) cervical musculature (UT, paraspinals) more irritable than (L) today with decreased tissue pliability and trigger point noted during manual therapy. Cervical rotation limited bilaterally due to (R) sided neck pain. Patient able to perform scapular strengthening without complaints of pain but training effect easily achieved. Light exercise progression today due to complaints of headache x 2 days following initial evaluation.   Sarah Is progressing well towards her goals.   Pt prognosis is Good.     Pt will continue to benefit from skilled outpatient physical therapy to address the deficits listed in the problem list box on initial evaluation, provide pt/family education and to maximize pt's level of independence in the home and community environment.     Pt's spiritual, cultural and educational needs considered and pt agreeable to plan of care and goals.    Anticipated barriers to physical therapy: none    Goals:   Short Term Goals: 4 weeks   Patient will be independent with HEP for symptom management  Patient will increase range of motion >5 deg in cervical/thoracic spine in all affected planes to improve functional mobility  Patient will increase strength of BUE by at least 1/2 grade via MMT testing to allow for improved performance of ADLs and daily functional tasks  Patient will demonstrate single leg balance >10 sec B      Long Term Goals: 8 weeks   Patient will be independent with progressive HEP for continued strengthening to support return to previous level of function  Patient will have grossly  symmetrical, pain-free range of motion for improved functional mobility  Patient will increase strength of BUE by at least 1 grade via MMT testing to allow for improved performance of ADLs and daily functional tasks  Patient will be able to walk >30 min  Patient will achieve <40% limitation as measured by the FOTO to demonstrate decreased functional disability  Patient will score >46/54 on Savage balance to indicate decreased fall risk    Plan     Continue current POC with emphasis on decreasing pain and improving postural control.     Antoinette Arthur, PTA

## 2022-07-15 ENCOUNTER — CLINICAL SUPPORT (OUTPATIENT)
Dept: REHABILITATION | Facility: HOSPITAL | Age: 67
End: 2022-07-15
Payer: MEDICARE

## 2022-07-15 DIAGNOSIS — M54.12 CERVICAL RADICULOPATHY: ICD-10-CM

## 2022-07-15 DIAGNOSIS — M54.2 NECK PAIN: Primary | ICD-10-CM

## 2022-07-15 PROCEDURE — 97140 MANUAL THERAPY 1/> REGIONS: CPT | Mod: PO

## 2022-07-15 NOTE — PROGRESS NOTES
Physical Therapy Daily Treatment Note     Name: Aye Montanez  Clinic Number: [id number]    Therapy Diagnosis:   Encounter Diagnoses   Name Primary?    Neck pain Yes    Cervical radiculopathy      Physician: Walter Ruffin PA-C    Visit Date: 7/15/2022   Physician Orders: PT Eval and Treat   Medical Diagnosis from Referral: M54.12 (ICD-10-CM) - Cervical radiculopathy  Evaluation Date: 7/7/2022  Authorization Period Expiration: 12/31/2022  Plan of Care Expiration: 09/07/2022  Progress Note Due: 6th visit or 08/07/2022  Visit # / Visits authorized:3/20  FOTO: Completed on 07/07/2022     Time In: 1400  Time Out: 1500  Total Billable Time: 60 minutes (30 min one-on-one with PT)    Precautions: Standard and Fall    Subjective     Pt reports: She had a slight headache following the last session, but overall better than initial visit. Her  has been able to do some gentle traction similar to what we do and it is helpful. She has performed her HEP and thinks it is helping.    She was compliant with home exercise program.  Response to previous treatment: soreness   Functional change: too soon to determine     Pain: 4/10  Location: bilateral neck and shoulder blade      Objective         SFMA FN: functional, nonpainful. FP: functional, painful. DP: dysfunctional, painful. DN: dysfunctional, nonpainful.   AROM cervical flexion Dysfunctional, painful   AROM cervical extension Dysfunctional, painful hinging at C5/C6   AROM cervical rotation R: Dysfunctional, painful ~45 deg  L: Dysfunctional, painful ~35 deg      Cervical rotation improved bilaterally at end of session right ~50 deg, left ~45 deg with pain at end range (left>right)      Sarah received therapeutic exercises to develop strength, endurance, ROM, flexibility, posture and core stabilization for 30 minutes including:  Supine chin tucks 3x15  Hook lying posterior pelvic tilts 2x15  Supine scapular retraction 3x15  Supine cervical rotation (B) x 10  Deep neck  "flexor endurance holds  5"x15  Seated scapular retraction x10  Seated bruegger 2x10  Seated cervical rotation (B) x 10  Seated rows (red TB) 2x10  Gentle scalene stretch 2x20" bilaterally   Gentle upper trapezius stretch 2x20" bilaterally     To add in future sessions:   Cervical SNAGs  Seated/quadruped Cat/cow  Seated/standing shoulder extensions  Radial, ulnar, medial nerve glides    Sarah received the following manual therapy techniques: Soft tissue Mobilization were applied to the: neck musculature for 25 minutes, including:  Cervical traction   Grade III/IV cervical PA mobilization (NP)  Soft tissue mobilization, cross friction massage cervical paravertebral muscles, lats, subscapularis, serratus anterior    Home Exercises Provided and Patient Education Provided     Education provided:   - HEP compliance    Written Home Exercises Provided: Patient instructed to cont prior HEP. (Added DNF endurance holds)  Exercises were reviewed and Sarah was able to demonstrate them prior to the end of the session.  Sarah demonstrated good  understanding of the education provided.     See EMR under Patient Instructions for exercises provided 07/07/2022.    Assessment     Sarah responded well to manual therapy today with improved cervical rotation at the end of the session and no increase in neck or shoulder pain. She was tolerated endurance training of deep neck flexors and increased volume of all therapeutic exercise with good training effect. She will benefit from increased cervical/thoracic stability to allow for improved mobility and reduced compensations with daily functional tasks.    Sarah Is progressing well towards her goals.   Pt prognosis is Good.     Pt will continue to benefit from skilled outpatient physical therapy to address the deficits listed in the problem list box on initial evaluation, provide pt/family education and to maximize pt's level of independence in the home and community environment.     Pt's spiritual, " cultural and educational needs considered and pt agreeable to plan of care and goals.    Anticipated barriers to physical therapy: none    Goals:   Short Term Goals: 4 weeks   Patient will be independent with HEP for symptom management  Patient will increase range of motion >5 deg in cervical/thoracic spine in all affected planes to improve functional mobility  Patient will increase strength of BUE by at least 1/2 grade via MMT testing to allow for improved performance of ADLs and daily functional tasks  Patient will demonstrate single leg balance >10 sec B      Long Term Goals: 8 weeks   Patient will be independent with progressive HEP for continued strengthening to support return to previous level of function  Patient will have grossly symmetrical, pain-free range of motion for improved functional mobility  Patient will increase strength of BUE by at least 1 grade via MMT testing to allow for improved performance of ADLs and daily functional tasks  Patient will be able to walk >30 min  Patient will achieve <40% limitation as measured by the FOTO to demonstrate decreased functional disability  Patient will score >46/54 on Savage balance to indicate decreased fall risk    Plan     Continue current POC with emphasis on decreasing pain and improving postural control. Self-mobilization techniques. Progress toward seated, standing, and resisted therapeutic exercise as tolerated.    Sami Nair, PT

## 2022-07-18 ENCOUNTER — OFFICE VISIT (OUTPATIENT)
Dept: NEUROSURGERY | Facility: CLINIC | Age: 67
End: 2022-07-18
Payer: MEDICARE

## 2022-07-18 VITALS
RESPIRATION RATE: 18 BRPM | HEART RATE: 72 BPM | DIASTOLIC BLOOD PRESSURE: 75 MMHG | HEIGHT: 67 IN | WEIGHT: 211 LBS | SYSTOLIC BLOOD PRESSURE: 137 MMHG | BODY MASS INDEX: 33.12 KG/M2

## 2022-07-18 DIAGNOSIS — R20.0 RIGHT FACIAL NUMBNESS: Primary | ICD-10-CM

## 2022-07-18 DIAGNOSIS — M48.02 CERVICAL SPINAL STENOSIS: ICD-10-CM

## 2022-07-18 DIAGNOSIS — M54.12 CERVICAL RADICULOPATHY: ICD-10-CM

## 2022-07-18 DIAGNOSIS — R15.9 INCONTINENCE OF FECES, UNSPECIFIED FECAL INCONTINENCE TYPE: ICD-10-CM

## 2022-07-18 PROCEDURE — 1100F PR PT FALLS ASSESS DOC 2+ FALLS/FALL W/INJURY/YR: ICD-10-PCS | Mod: CPTII,S$GLB,, | Performed by: PHYSICIAN ASSISTANT

## 2022-07-18 PROCEDURE — 99205 OFFICE O/P NEW HI 60 MIN: CPT | Mod: S$GLB,,, | Performed by: PHYSICIAN ASSISTANT

## 2022-07-18 PROCEDURE — 1100F PTFALLS ASSESS-DOCD GE2>/YR: CPT | Mod: CPTII,S$GLB,, | Performed by: PHYSICIAN ASSISTANT

## 2022-07-18 PROCEDURE — 3075F PR MOST RECENT SYSTOLIC BLOOD PRESS GE 130-139MM HG: ICD-10-PCS | Mod: CPTII,S$GLB,, | Performed by: PHYSICIAN ASSISTANT

## 2022-07-18 PROCEDURE — 3078F PR MOST RECENT DIASTOLIC BLOOD PRESSURE < 80 MM HG: ICD-10-PCS | Mod: CPTII,S$GLB,, | Performed by: PHYSICIAN ASSISTANT

## 2022-07-18 PROCEDURE — 3078F DIAST BP <80 MM HG: CPT | Mod: CPTII,S$GLB,, | Performed by: PHYSICIAN ASSISTANT

## 2022-07-18 PROCEDURE — 3288F FALL RISK ASSESSMENT DOCD: CPT | Mod: CPTII,S$GLB,, | Performed by: PHYSICIAN ASSISTANT

## 2022-07-18 PROCEDURE — 99205 PR OFFICE/OUTPT VISIT, NEW, LEVL V, 60-74 MIN: ICD-10-PCS | Mod: S$GLB,,, | Performed by: PHYSICIAN ASSISTANT

## 2022-07-18 PROCEDURE — 3288F PR FALLS RISK ASSESSMENT DOCUMENTED: ICD-10-PCS | Mod: CPTII,S$GLB,, | Performed by: PHYSICIAN ASSISTANT

## 2022-07-18 PROCEDURE — 3008F BODY MASS INDEX DOCD: CPT | Mod: CPTII,S$GLB,, | Performed by: PHYSICIAN ASSISTANT

## 2022-07-18 PROCEDURE — 3075F SYST BP GE 130 - 139MM HG: CPT | Mod: CPTII,S$GLB,, | Performed by: PHYSICIAN ASSISTANT

## 2022-07-18 PROCEDURE — 1125F AMNT PAIN NOTED PAIN PRSNT: CPT | Mod: CPTII,S$GLB,, | Performed by: PHYSICIAN ASSISTANT

## 2022-07-18 PROCEDURE — 3008F PR BODY MASS INDEX (BMI) DOCUMENTED: ICD-10-PCS | Mod: CPTII,S$GLB,, | Performed by: PHYSICIAN ASSISTANT

## 2022-07-18 PROCEDURE — 1125F PR PAIN SEVERITY QUANTIFIED, PAIN PRESENT: ICD-10-PCS | Mod: CPTII,S$GLB,, | Performed by: PHYSICIAN ASSISTANT

## 2022-07-18 NOTE — PROGRESS NOTES
Neurosurgery History & Physical    Patient ID: Aye Montanez is a 67 y.o. female.    Chief Complaint   Patient presents with    Neck Pain     Patient reports to clinic with c/o neck pain into the shoulders with the R side being greater than the L. L arm numbness from the elbow down to the hand.  Pain travels up the neck, to the back of the head and around to the face. Gets numbness on the R side of the face.  Constant headaches.  She says she has 50% blockage in the vertebral artery.  History of TIAs, Show grins, and Lupus.  Limited ROM in the neck. Has balance issues and stumbles often.        Review of Systems   Constitutional: Negative for chills, diaphoresis, fatigue and fever.   HENT: Negative for congestion, ear pain, rhinorrhea, sneezing, sore throat and tinnitus.    Eyes: Negative for photophobia, pain, redness and visual disturbance.   Respiratory: Negative for cough, chest tightness, shortness of breath and wheezing.    Cardiovascular: Negative for chest pain, palpitations and leg swelling.   Gastrointestinal: Negative for abdominal distention, abdominal pain, constipation, diarrhea, nausea and vomiting.   Genitourinary: Negative for difficulty urinating, dysuria, frequency and urgency.   Musculoskeletal: Positive for gait problem and neck pain. Negative for back pain and myalgias.   Skin: Negative for pallor and rash.   Neurological: Positive for numbness and headaches. Negative for dizziness, seizures, speech difficulty and weakness.   Psychiatric/Behavioral: Negative for confusion and hallucinations.       Past Medical History:   Diagnosis Date    Sjogren's syndrome 12/05/2016    Systemic lupus erythematosus, organ or system involvement unspecified      Social History     Socioeconomic History    Marital status:    Tobacco Use    Smoking status: Never Smoker    Smokeless tobacco: Never Used   Substance and Sexual Activity    Alcohol use: Yes     Comment: rarely    Drug use: No     "Sexual activity: Yes     Partners: Male     Social Determinants of Health     Financial Resource Strain: Low Risk     Difficulty of Paying Living Expenses: Not hard at all   Food Insecurity: No Food Insecurity    Worried About Running Out of Food in the Last Year: Never true    Ran Out of Food in the Last Year: Never true   Transportation Needs: No Transportation Needs    Lack of Transportation (Medical): No    Lack of Transportation (Non-Medical): No   Physical Activity: Inactive    Days of Exercise per Week: 0 days    Minutes of Exercise per Session: 0 min   Stress: No Stress Concern Present    Feeling of Stress : Only a little   Social Connections: Moderately Integrated    Frequency of Communication with Friends and Family: Three times a week    Frequency of Social Gatherings with Friends and Family: Three times a week    Attends Scientologist Services: More than 4 times per year    Active Member of Clubs or Organizations: No    Attends Club or Organization Meetings: Never    Marital Status:    Housing Stability: Unknown    Unable to Pay for Housing in the Last Year: No    Unstable Housing in the Last Year: No     Family History   Problem Relation Age of Onset    Cancer Mother     Ovarian cancer Mother 40    Cancer Father         esophageal    Esophageal cancer Father     Stroke Maternal Aunt     Rheum arthritis Maternal Aunt     Rheum arthritis Paternal Uncle     Breast cancer Maternal Cousin     Breast cancer Maternal Cousin     Glaucoma Neg Hx     Macular degeneration Neg Hx      Review of patient's allergies indicates:   Allergen Reactions    Ceftin [cefuroxime axetil] Itching    Gabapentin      Felt like "no blood flow to my legs"    Metoprolol      Leg pain    [id number] antimigraine agents      Was told never to take this medication due to vascular issues - Per Neurologist     Versed [midazolam]      "wants to come out of my skin - I get hyped"       Current " "Outpatient Medications:     acetaminophen (TYLENOL) 500 MG tablet, Take 1,000 mg by mouth every 6 (six) hours as needed for Pain., Disp: , Rfl:     belimumab (BENLYSTA) 200 mg/mL Syrg, Inject 1 mL (200 mg total) into the skin once a week., Disp: 4 mL, Rfl: 11    clopidogreL (PLAVIX) 75 mg tablet, Take 1 tablet (75 mg total) by mouth once daily. Take with aspirin 81 mg for 21 days. Then, stop aspirin and take just clopidogrel thereafter., Disp: 90 tablet, Rfl: 3    fluorometholone 0.1% (FML) 0.1 % DrpS, , Disp: , Rfl:     furosemide (LASIX) 20 MG tablet, Take 1 tablet (20 mg total) by mouth once daily., Disp: 90 tablet, Rfl: 3    hydrocortisone 2.5 % cream, Apply topically 2 (two) times daily., Disp: 1 Tube, Rfl: 1    levothyroxine (SYNTHROID) 25 MCG tablet, Take 1 tablet (25 mcg total) by mouth before breakfast., Disp: 30 tablet, Rfl: 11    magnesium oxide (MAG-OX) 400 mg (241.3 mg magnesium) tablet, Take 400 mg by mouth once daily., Disp: , Rfl:     multivitamin (THERAGRAN) per tablet, Take 1 tablet by mouth once daily., Disp: , Rfl:     omeprazole (PRILOSEC) 20 MG capsule, Take 1 capsule (20 mg total) by mouth once daily., Disp: 90 capsule, Rfl: 3    potassium chloride SA (K-DUR,KLOR-CON) 20 MEQ tablet, Take 1 tablet (20 mEq total) by mouth once daily., Disp: 90 tablet, Rfl: 3    pregabalin (LYRICA) 50 MG capsule, Take 1 capsule (50 mg total) by mouth 2 (two) times daily., Disp: 60 capsule, Rfl: 01    rosuvastatin (CRESTOR) 40 MG Tab, Take 1 tablet (40 mg total) by mouth once daily., Disp: 90 tablet, Rfl: 3    syringe with needle 3 mL 22 gauge x 3/4" Syrg, 6 Syringes by Misc.(Non-Drug; Combo Route) route every 30 days. Use to inject cyanocobalamin once every 30 days., Disp: , Rfl:     zinc gluconate 50 mg tablet, Take 50 mg by mouth once daily., Disp: , Rfl:   Blood pressure 137/75, pulse 72, resp. rate 18, height 5' 7" (1.702 m), weight 95.7 kg (210 lb 15.7 oz).      Neurologic Exam     Mental " Status   Oriented to person, place, and time.   Oriented to person.   Oriented to place.   Oriented to time.   Follows 3 step commands.   Attention: normal. Concentration: normal.   Speech: speech is normal   Level of consciousness: alert  Knowledge: consistent with education.   Able to name object. Able to read. Able to repeat. Able to write. Normal comprehension.      Cranial Nerves      CN II   Visual acuity: normal  Right visual field deficit: none  Left visual field deficit: none      CN III, IV, VI   Pupils are equal, round, and reactive to light.  Right pupil: Size: 3 mm. Shape: regular. Reactivity: brisk. Consensual response: intact.   Left pupil: Size: 3 mm. Shape: regular. Reactivity: brisk. Consensual response: intact.   CN III: no CN III palsy  CN VI: no CN VI palsy  Nystagmus: none   Diplopia: none  Ophthalmoparesis: none  Conjugate gaze: present     CN V   Right facial sensation deficit: none  Left facial sensation deficit: none     CN VII   Right facial weakness: none  Left facial weakness: none     CN VIII   Hearing: intact     CN IX, X   CN IX normal.   CN X normal.      CN XI   Right sternocleidomastoid strength: normal  Left sternocleidomastoid strength: normal  Right trapezius strength: normal  Left trapezius strength: normal     CN XII   Fasciculations: absent  Tongue deviation: none     Motor Exam   Muscle bulk: normal  Overall muscle tone: normal  Right arm pronator drift: absent  Left arm pronator drift: absent     Strength   Right deltoid: 5/5  Left deltoid: 5/5  Right biceps: 5/5  Left biceps: 5/5  Right triceps: 5/5  Left triceps: 5/5  Right wrist flexion: 4+/5  Left wrist flexion: 5/5  Right wrist extension: 5/5  Left wrist extension: 5/5  Right interossei: 5/5  Left interossei: 5/5  Right iliopsoas: 5/5  Left iliopsoas: 5/5  Right quadriceps: 4+/5-pain limited  Left quadriceps: 4+/5-pain limited  Right hamstrin/5  Left hamstrin/5  Right anterior tibial: 5/5  Left anterior tibial:  5/5  Right posterior tibial: 5/5  Left posterior tibial: 5/5  Right peroneal: 5/5  Left peroneal: 5/5  Right gastroc: 5/5  Left gastroc: 5/5     Sensory Exam   Right arm light touch: normal  Left arm light touch: normal  Right leg light touch: normal  Left leg light touch: normal     Gait, Coordination, and Reflexes      Gait  Gait: normal      Coordination   Romberg: positive  Finger to nose coordination: normal  Heel to shin coordination: normal  Tandem walking coordination: normal     Tremor   Resting tremor: absent  Intention tremor: absent  Action tremor: absent     Reflexes   Right brachioradialis: 3+  Left brachioradialis: 3+  Right biceps: 3+  Left biceps: 3+  Right triceps: 3+  Left triceps: 3+  Right patellar: 2+  Left patellar: 2+  Right achilles: 1+  Left achilles: 1+  Right Luna: present  Left Luna: present  Right ankle clonus: absent  Left ankle clonus: absent  Right plantar: normal  Left plantar: normal     Physical Exam  Constitutional: Oriented to person, place, and time. Appears well-developed and well-nourished.   HENT:   Head: Normocephalic and atraumatic.   Eyes: Pupils are equal, round, and reactive to light.   Neck: Normal range of motion. Neck supple.   Cardiovascular: Normal rate.    Pulmonary/Chest: Effort normal.   Musculoskeletal: Normal range of motion. Exhibits no edema.   Neurological: Alert and oriented to person, place, and time. Normal Finger-Nose-Finger Test, a normal Heel to Shin Test, a normal Romberg Test and a normal Tandem Gait Test. Gait normal.   Reflex Scores:       Tricep reflexes are 3+ on the right side and 3+ on the left side.       Bicep reflexes are 3+ on the right side and 3+ on the left side.       Brachioradialis reflexes are 3+ on the right side and 3+ on the left side.       Patellar reflexes are 2+ on the right side and 2+ on the left side.       Achilles reflexes are 1+ on the right side and 1+ on the left side.  Skin: Skin is warm, dry and intact.    Psychiatric: Normal mood and affect. Speech is normal and behavior is normal. Judgment and thought content normal.   Nursing note and vitals reviewed.      Provider dictation:  No updated imaging.     The patient is a 67 year old female with PMH of CVA/TIAs on plavix, HTN, CAD s/p stent in 2017, Lupus, and Sjogren's syndrome who presents for neurosurgical consultation referred by Walter Ruffin PA-C. Patient reports 3-4 year history of neck pain and headaches. The pain is mainly in the right side of the neck and shoots into the occipital aspect of her head. She has some intermittent left upper extremity pain down to the elbow and numbness in the left arm mainly from the elbow which radiates into the forearm. She had a cervical SONYA with Dr. Oshea recently and denies significant relief of her symptoms. She has also just started physical therapy. She has noticed she will occasionally drop objects mainly from her right hand for years. She reports some balance difficulty as well. She also reports some cramping of her right lower extremity and states that her leg will start turning inward. She reports 3 episodes of bowel incontinence since May. Denies saddle anesthesia. She also states that for the past few months she has right sided facial numbness and right ear pain.     On exam the patient has mild right wrist flexion weakness and pain limited bilateral hip flexion weakness. + Romberg and bilateral judge sign present. There is bilateral upper extremity hyperreflexia.     I will obtain updated MRIs of the brain, cervical spine, thoracic spine, and lumbar spine to further evaluate given patient's right facial numbness, signs of myelopathy on exam and episodes of bowel incontinence. I will call the patient with the results and further plan.       Face to Face time with patient: 35 minutes  65 minutes of total time spent on the encounter, which includes face to face time and non-face to face time preparing to see the  patient (eg, review of tests), Obtaining and/or reviewing separately obtained history, Documenting clinical information in the electronic or other health record, Independently interpreting results (not separately reported) and communicating results to the patient/family/caregiver, or Care coordination (not separately reported).       1. Right facial numbness  MRI Brain Without Contrast   2. Cervical radiculopathy  Ambulatory referral/consult to Neurosurgery   3. Incontinence of feces, unspecified fecal incontinence type  MRI Thoracic Spine Without Contrast    MRI Lumbar Spine Without Contrast   4. Cervical spinal stenosis  MRI Cervical Spine Without Contrast

## 2022-07-20 ENCOUNTER — PATIENT MESSAGE (OUTPATIENT)
Dept: GASTROENTEROLOGY | Facility: CLINIC | Age: 67
End: 2022-07-20
Payer: MEDICARE

## 2022-07-20 NOTE — TELEPHONE ENCOUNTER
Called pt. Pt complaining of diarrhea and bloating. Pt requesting appointment, advised pt her scheduled appointment is the earliest available

## 2022-07-22 ENCOUNTER — PATIENT MESSAGE (OUTPATIENT)
Dept: NEUROSURGERY | Facility: CLINIC | Age: 67
End: 2022-07-22
Payer: MEDICARE

## 2022-07-25 ENCOUNTER — PATIENT MESSAGE (OUTPATIENT)
Dept: RHEUMATOLOGY | Facility: CLINIC | Age: 67
End: 2022-07-25
Payer: MEDICARE

## 2022-07-25 ENCOUNTER — CLINICAL SUPPORT (OUTPATIENT)
Dept: REHABILITATION | Facility: HOSPITAL | Age: 67
End: 2022-07-25
Payer: MEDICARE

## 2022-07-25 DIAGNOSIS — M54.12 CERVICAL RADICULOPATHY: ICD-10-CM
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
## 2022-07-25 DIAGNOSIS — M54.2 NECK PAIN: Primary | ICD-10-CM

## 2022-07-25 PROCEDURE — 97110 THERAPEUTIC EXERCISES: CPT | Mod: PO

## 2022-07-25 PROCEDURE — 97140 MANUAL THERAPY 1/> REGIONS: CPT | Mod: PO

## 2022-07-25 NOTE — PROGRESS NOTES
"  Physical Therapy Daily Treatment Note     Name: Aye Montanez  Clinic Number: [id number]    Therapy Diagnosis:   Encounter Diagnoses   Name Primary?    Neck pain Yes    Cervical radiculopathy     Fall, subsequent encounter      Physician: Walter Ruffin PA-C    Visit Date: 7/25/2022   Physician Orders: PT Eval and Treat   Medical Diagnosis from Referral: M54.12 (ICD-10-CM) - Cervical radiculopathy  Evaluation Date: 7/7/2022  Authorization Period Expiration: 12/31/2022  Plan of Care Expiration: 09/07/2022  Progress Note Due: 6th visit or 08/07/2022  Visit # / Visits authorized:3/20  FOTO: Completed on 07/07/2022     Time In: 1600  Time Out: 1700  Total Billable Time: 60 minutes one-on-one with PT    Precautions: Standard and Fall    Subjective     Pt reports: She has been struggling since her last session. She had severe GI issues (diarrheah, bloating, pain) which has not fully resolved. She had MRI of cervical/thoracic/lumbar spine and is waiting to discuss results and Plan of Care with her neurosurgeon. She has performed her HEP and thinks it is helping to manage her symptoms, but decreased relief this week. She is having constant "toothache" pain around the left shoulder joint line and sometimes down the lateral arm.      She was compliant with home exercise program.  Response to previous treatment: soreness   Functional change: too soon to determine     Pain: 4/10  Location: bilateral neck and shoulder blade      Objective         SFMA FN: functional, nonpainful. FP: functional, painful. DP: dysfunctional, painful. DN: dysfunctional, nonpainful.   AROM cervical flexion Dysfunctional, painful   AROM cervical extension Dysfunctional, painful hinging at C5/C6   AROM cervical rotation R: Dysfunctional, painful ~45 deg  L: Dysfunctional, painful ~35 deg      Cervical rotation improved bilaterally at end of session right ~50 deg, left ~45 deg with pain at end range (left>right)      Aye received therapeutic " "exercises to develop strength, endurance, ROM, flexibility, posture and core stabilization for 30 minutes including:  Supine chin tucks 3x15  Hook lying posterior pelvic tilts 2x15  Bent knee fallouts, alternating 2x10 bilaterally   Supine scapular retraction 3x15  Supine cervical rotation (B) 2x15  Deep neck flexor endurance holds  5"x15 (NP)  Seated scapular retraction x10  Seated cervical rotation (B) x 10  Seated rows (red TB) 2x10 (NP)  Gentle scalene stretch 2x20" bilaterally   Gentle upper trapezius stretch 2x20" bilaterally     To add in future sessions:   Cervical SNAGs  Seated/quadruped Cat/cow  Seated/standing shoulder extensions  Radial, ulnar, medial nerve glides    Aye received the following manual therapy techniques: Soft tissue Mobilization were applied to the: neck musculature for 30 minutes, including:  Cervical traction   Grade III/IV cervical PA mobilization  Soft tissue mobilization, cross friction massage cervical paravertebral muscles, lats, subscapularis, serratus anterior, upper trapezius, Levator Scapulae  GH, scapular mobilization (inferior, posterior)    Home Exercises Provided and Patient Education Provided     Education provided:   - HEP compliance    Written Home Exercises Provided: Patient instructed to cont prior HEP. (Added DNF endurance holds)  Exercises were reviewed and Aye was able to demonstrate them prior to the end of the session.  Aye demonstrated good  understanding of the education provided.     See EMR under Patient Instructions for exercises provided 07/07/2022.    Assessment     Sarah tolerated manual therapy today with improved cervical rotation at the end of the session, but no change in her LUE symptoms. She demonstrated good understanding of HEP for symptom management. She will benefit from increased cervical/thoracic stability to allow for improved mobility and reduced compensations with daily functional tasks.    Aye Is progressing well towards her " goals.   Pt prognosis is Good.     Pt will continue to benefit from skilled outpatient physical therapy to address the deficits listed in the problem list box on initial evaluation, provide pt/family education and to maximize pt's level of independence in the home and community environment.     Pt's spiritual, cultural and educational needs considered and pt agreeable to plan of care and goals.    Anticipated barriers to physical therapy: none    Goals:   Short Term Goals: 4 weeks   Patient will be independent with HEP for symptom management  Patient will increase range of motion >5 deg in cervical/thoracic spine in all affected planes to improve functional mobility  Patient will increase strength of BUE by at least 1/2 grade via MMT testing to allow for improved performance of ADLs and daily functional tasks  Patient will demonstrate single leg balance >10 sec B      Long Term Goals: 8 weeks   Patient will be independent with progressive HEP for continued strengthening to support return to previous level of function  Patient will have grossly symmetrical, pain-free range of motion for improved functional mobility  Patient will increase strength of BUE by at least 1 grade via MMT testing to allow for improved performance of ADLs and daily functional tasks  Patient will be able to walk >30 min  Patient will achieve <40% limitation as measured by the FOTO to demonstrate decreased functional disability  Patient will score >46/54 on Savage balance to indicate decreased fall risk    Plan     Continue current POC with emphasis on decreasing pain and improving postural control. Self-mobilization techniques. Progress toward seated, standing, and resisted therapeutic exercise as tolerated.    Sami Nair, PT

## 2022-07-26 ENCOUNTER — TELEPHONE (OUTPATIENT)
Dept: NEUROSURGERY | Facility: CLINIC | Age: 67
End: 2022-07-26
Payer: MEDICARE

## 2022-07-26 NOTE — TELEPHONE ENCOUNTER
Called patient to review results of imaging. No answer. Left voicemail to return call.       ----- Message from Palmira Hagen MA sent at 7/18/2022  4:13 PM CDT -----  Regarding: Results  Please contact patient regarding imaging results from 07/23/22.  Thanks!
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
## 2022-07-27 PROBLEM — R29.6 FALLS: Status: ACTIVE | Noted: 2022-07-27

## 2022-07-29 ENCOUNTER — OFFICE VISIT (OUTPATIENT)
Dept: PAIN MEDICINE | Facility: CLINIC | Age: 67
End: 2022-07-29
Payer: MEDICARE

## 2022-07-29 VITALS
OXYGEN SATURATION: 99 % | HEIGHT: 67 IN | WEIGHT: 219.94 LBS | DIASTOLIC BLOOD PRESSURE: 72 MMHG | SYSTOLIC BLOOD PRESSURE: 165 MMHG | HEART RATE: 71 BPM | BODY MASS INDEX: 34.52 KG/M2

## 2022-07-29 DIAGNOSIS — M54.2 NECK PAIN: ICD-10-CM

## 2022-07-29 DIAGNOSIS — M54.12 CERVICAL RADICULOPATHY: Primary | ICD-10-CM

## 2022-07-29 DIAGNOSIS — M47.812 CERVICAL SPONDYLOSIS: ICD-10-CM

## 2022-07-29 PROCEDURE — 3288F PR FALLS RISK ASSESSMENT DOCUMENTED: ICD-10-PCS | Mod: CPTII,S$GLB,,

## 2022-07-29 PROCEDURE — 3078F PR MOST RECENT DIASTOLIC BLOOD PRESSURE < 80 MM HG: ICD-10-PCS | Mod: CPTII,S$GLB,,

## 2022-07-29 PROCEDURE — 1101F PT FALLS ASSESS-DOCD LE1/YR: CPT | Mod: CPTII,S$GLB,,

## 2022-07-29 PROCEDURE — 99215 OFFICE O/P EST HI 40 MIN: CPT | Mod: S$GLB,,,

## 2022-07-29 PROCEDURE — 3288F FALL RISK ASSESSMENT DOCD: CPT | Mod: CPTII,S$GLB,,

## 2022-07-29 PROCEDURE — 3077F PR MOST RECENT SYSTOLIC BLOOD PRESSURE >= 140 MM HG: ICD-10-PCS | Mod: CPTII,S$GLB,,

## 2022-07-29 PROCEDURE — 99999 PR PBB SHADOW E&M-EST. PATIENT-LVL III: ICD-10-PCS | Mod: PBBFAC,,,

## 2022-07-29 PROCEDURE — 1101F PR PT FALLS ASSESS DOC 0-1 FALLS W/OUT INJ PAST YR: ICD-10-PCS | Mod: CPTII,S$GLB,,

## 2022-07-29 PROCEDURE — 3078F DIAST BP <80 MM HG: CPT | Mod: CPTII,S$GLB,,

## 2022-07-29 PROCEDURE — 1159F PR MEDICATION LIST DOCUMENTED IN MEDICAL RECORD: ICD-10-PCS | Mod: CPTII,S$GLB,,

## 2022-07-29 PROCEDURE — 99999 PR PBB SHADOW E&M-EST. PATIENT-LVL III: CPT | Mod: PBBFAC,,,

## 2022-07-29 PROCEDURE — 99215 PR OFFICE/OUTPT VISIT, EST, LEVL V, 40-54 MIN: ICD-10-PCS | Mod: S$GLB,,,

## 2022-07-29 PROCEDURE — 3008F PR BODY MASS INDEX (BMI) DOCUMENTED: ICD-10-PCS | Mod: CPTII,S$GLB,,

## 2022-07-29 PROCEDURE — 3077F SYST BP >= 140 MM HG: CPT | Mod: CPTII,S$GLB,,

## 2022-07-29 PROCEDURE — 1159F MED LIST DOCD IN RCRD: CPT | Mod: CPTII,S$GLB,,

## 2022-07-29 PROCEDURE — 3008F BODY MASS INDEX DOCD: CPT | Mod: CPTII,S$GLB,,

## 2022-07-29 RX ORDER — DULOXETIN HYDROCHLORIDE 30 MG/1
30 CAPSULE, DELAYED RELEASE ORAL 2 TIMES DAILY
Qty: 60 CAPSULE | Refills: 2 | Status: SHIPPED | OUTPATIENT
Start: 2022-07-29 | End: 2022-09-09

## 2022-07-29 NOTE — PROGRESS NOTES
[facility] Follow Up Evaluation    Referred by: Dr. Skinner  Reason for referral: neck pain    CC:   Chief Complaint   Patient presents with    Follow-up      Last 3 PDI Scores 6/24/2022 10/8/2021   Pain Disability Index (PDI) 37 31     Interval HPI 7/29/2022: Aye Montanez returns to the clinic for follow up.  Since our last visit she has follow-up with Neurosurgery.  During the time of the appointment with Neurosurgery she was having worsening bowel incontinence, and continued pain.  As result she had further imaging done of her MRI brain, MRI cervical spine, MRI thoracic spine, and MRI lumbar spine.  Neurosurgery reviewed the imaging and did not recommend any surgical interventions for her cervical spine at this time.  Today she continues report neck pain with radiation into bilateral shoulders and down her left arm.  This pain is constant, 6-8/10, worse with activities.  She continues to have intermittent numbness in her left arm.  She denies any sugey weakness.  She has been continuing to take Lyrica and attending formal physical therapy without significant relief of her pain.  This pain continues to limit her mobility, interfere with her ADLs and is limiting her quality of life.  She has a history of cerebrovascular accident and is on chronic anticoagulation.       Pain Intervention History:  - s/p cervical SONYA on 6/10/2022 with no relief of her pain.       HPI:   Aye Montanez is a 67 y.o. female who complains of neck pain    Onset: about 10 months  Progression: since onset, pain is gradually worsening  Typical Range: 6-10/10  Timing: constant  Quality: aching, numb, burning, tingling  Radiation: yes, down both arms  Associated numbness or weakness: yes numbness, yes weakness    Exacerbated by: lifting  Allievated by: rest, laying  Is Pain Level Acceptable?: No    Previous Therapies:  PT/OT:   HEP:   Interventions:   Surgery:  Medications:   - NSAIDS:   - MSK Relaxants:   - TCAs:   - SNRIs:  "  - Topicals:   - Anticonvulsants:  Lyrica-did not tolerate gave her diarrhea.  - Opioids:     History:    Current Outpatient Medications:     acetaminophen (TYLENOL) 500 MG tablet, Take 1,000 mg by mouth every 6 (six) hours as needed for Pain., Disp: , Rfl:     belimumab (BENLYSTA) 200 mg/mL Syrg, Inject 1 mL (200 mg total) into the skin once a week., Disp: 4 mL, Rfl: 11    clopidogreL (PLAVIX) 75 mg tablet, Take 1 tablet (75 mg total) by mouth once daily. Take with aspirin 81 mg for 21 days. Then, stop aspirin and take just clopidogrel thereafter., Disp: 90 tablet, Rfl: 3    diazePAM (VALIUM) 5 MG tablet, Take 1 tablet (5 mg total) by mouth once as needed (30 min prior to surgery)., Disp: 2 tablet, Rfl: 0    fluorometholone 0.1% (FML) 0.1 % DrpS, , Disp: , Rfl:     furosemide (LASIX) 20 MG tablet, Take 1 tablet (20 mg total) by mouth once daily., Disp: 90 tablet, Rfl: 3    hydrocortisone 2.5 % cream, Apply topically 2 (two) times daily., Disp: 1 Tube, Rfl: 1    levothyroxine (SYNTHROID) 25 MCG tablet, Take 1 tablet (25 mcg total) by mouth before breakfast., Disp: 30 tablet, Rfl: 11    magnesium oxide (MAG-OX) 400 mg (241.3 mg magnesium) tablet, Take 400 mg by mouth once daily., Disp: , Rfl:     omeprazole (PRILOSEC) 20 MG capsule, Take 1 capsule (20 mg total) by mouth once daily., Disp: 90 capsule, Rfl: 3    potassium chloride SA (K-DUR,KLOR-CON) 20 MEQ tablet, Take 1 tablet (20 mEq total) by mouth once daily., Disp: 90 tablet, Rfl: 3    pregabalin (LYRICA) 50 MG capsule, Take 1 capsule (50 mg total) by mouth 2 (two) times daily., Disp: 60 capsule, Rfl: 01    rosuvastatin (CRESTOR) 40 MG Tab, Take 1 tablet (40 mg total) by mouth once daily., Disp: 90 tablet, Rfl: 3    syringe with needle 3 mL 22 gauge x 3/4" Syrg, 6 Syringes by Misc.(Non-Drug; Combo Route) route every 30 days. Use to inject cyanocobalamin once every 30 days., Disp: , Rfl:     zinc gluconate 50 mg tablet, Take 50 mg by mouth once " daily., Disp: , Rfl:     Past Medical History:   Diagnosis Date    Sjogren's syndrome 12/05/2016    Systemic lupus erythematosus, organ or system involvement unspecified        Past Surgical History:   Procedure Laterality Date    APPENDECTOMY      CHOLECYSTECTOMY      COLONOSCOPY      COLONOSCOPY N/A 6/9/2022    Procedure: COLONOSCOPY;  Surgeon: Eugene Del Castillo MD;  Location: [facility] ENDO;  Service: Endoscopy;  Laterality: N/A;    CORONARY STENT PLACEMENT      10/2017    EPIDURAL STEROID INJECTION INTO CERVICAL SPINE N/A 6/10/2022    Procedure: Injection-steroid-epidural-cervical;  Surgeon: Dickson Oshea MD;  Location: [facility] OR;  Service: Pain Management;  Laterality: N/A;    ESOPHAGOGASTRODUODENOSCOPY N/A 6/9/2022    Procedure: EGD (ESOPHAGOGASTRODUODENOSCOPY);  Surgeon: Eugene Del Castillo MD;  Location: [facility] ENDO;  Service: Endoscopy;  Laterality: N/A;    FRACTURE SURGERY Right     foot    HYSTERECTOMY      1984    KNEE ARTHROSCOPY Right 2/2016    OOPHORECTOMY Bilateral     hyst. 1984    TOTAL REDUCTION MAMMOPLASTY Bilateral     1999    UPPER GASTROINTESTINAL ENDOSCOPY         Family History   Problem Relation Age of Onset    Cancer Mother     Ovarian cancer Mother 40    Cancer Father         esophageal    Esophageal cancer Father     Stroke Maternal Aunt     Rheum arthritis Maternal Aunt     Rheum arthritis Paternal Uncle     Breast cancer Maternal Cousin     Breast cancer Maternal Cousin     Glaucoma Neg Hx     Macular degeneration Neg Hx        Social History     Socioeconomic History    Marital status:    Tobacco Use    Smoking status: Never Smoker    Smokeless tobacco: Never Used   Substance and Sexual Activity    Alcohol use: Yes     Comment: rarely    Drug use: No    Sexual activity: Yes     Partners: Male     Social Determinants of Health     Financial Resource Strain: Low Risk     Difficulty of Paying Living Expenses: Not hard at all   Food Insecurity: No Food  "Insecurity    Worried About Running Out of Food in the Last Year: Never true    Ran Out of Food in the Last Year: Never true   Transportation Needs: No Transportation Needs    Lack of Transportation (Medical): No    Lack of Transportation (Non-Medical): No   Physical Activity: Inactive    Days of Exercise per Week: 0 days    Minutes of Exercise per Session: 0 min   Stress: No Stress Concern Present    Feeling of Stress : Only a little   Social Connections: Moderately Integrated    Frequency of Communication with Friends and Family: Three times a week    Frequency of Social Gatherings with Friends and Family: Three times a week    Attends Confucianist Services: More than 4 times per year    Active Member of Clubs or Organizations: No    Attends Club or Organization Meetings: Never    Marital Status:    Housing Stability: Unknown    Unable to Pay for Housing in the Last Year: No    Unstable Housing in the Last Year: No       Review of patient's allergies indicates:   Allergen Reactions    Ceftin [cefuroxime axetil] Itching    Gabapentin      Felt like "no blood flow to my legs"    Metoprolol      Leg pain    [id number] antimigraine agents      Was told never to take this medication due to vascular issues - Per Neurologist     Versed [midazolam]      "wants to come out of my skin - I get hyped"       Review of Systems:  General ROS: negative for - fever  Psychological ROS: negative for - hostility  Hematological and Lymphatic ROS: negative for - bleeding problems  Endocrine ROS: negative for - unexpected weight changes  Respiratory ROS: no cough, shortness of breath, or wheezing  Cardiovascular ROS: no chest pain or dyspnea on exertion  Gastrointestinal ROS: no abdominal pain, change in bowel habits, or black or bloody stools  Musculoskeletal ROS: positive for - muscular weakness  Neurological ROS: positive for - numbness/tingling  Dermatological ROS: negative for rash    Physical " "Exam:  Vitals:    07/29/22 1320   BP: (!) 165/72   Pulse: 71   SpO2: 99%   Weight: 99.7 kg (219 lb 14.5 oz)   Height: 5' 7" (1.702 m)     Body mass index is 34.44 kg/m².     Gen: NAD  Psych: mood appropriate for given condition, emotional, frustrated with pain  CV: 2+ radial pulse  HEENT: anicteric   Respiratory: non labored  Abd: soft nt, nd  Skin: intact  Sensation: intact to lt touch bilaterally in c4-t1   Reflexes: brisk 2+ b/l Bicep, tricep, BR and patella Luna positive bilaterally  ROM: Cervical ROM full, shoulder, elbow and wrist ROM full  Tone:  Normal at elbow, wrist and shoulder   Inspection: no atrophy of bicep, FDI or APB noted  Palpation: tender cervical paraspinals, levator scapula and trapezius    Motor:    Right Left   C4 Shoulder Abduction  5-  5   C5 Elbow Flexion    5  5   C6 Wrist Extension  5  5   C7 Elbow Extension   5  5   C8/T1 Hand Intrinsics   5  5                 Gait: gait intact  ROM: limited AROM of the L spine in all planes, full ROM at ankles, knees and hips  Lumbar flexion 90 degrees, extension 50 degrees, side bending 30 degrees.    Sensation: intact to light touch in all dermatomes tested from L2-S1 bilaterally  Reflexes:  Brisk 2+ bilateral patella, 0/0 bilateral Achilles  Tone: normal in the b/l knees and hips   Skin: intact  Extremities: No edema in b/l ankles or hands  Provacative: mild increase in pain with left sided SLR           Right Left   L2/3 Iliacus Hip flexion  5  5   L3/4 Qudratus Femoris Knee Extension  5  5   L4/5 Tib Anterior Ankle Dorsiflexion   5  5   L5/S1 Extensor Hallicus Longus Great toe extension  5  5                           S1/S2 Gastroc/Soleus Plantar Flexion  5  5           Imaging:  MRI cervical spine 7/15/21  FINDINGS:  Straightening of normal cervical lordosis.  Dextrocurvature.  No spondylolisthesis.  No acute fracture with preserved vertebral body heights.  No marrow replacement.  Multilevel disc desiccation.  Mild disc height loss at C4-5 " through C6-7.  Cervical cord normal in signal.  Slight ventral cord flattening focally at C3-4 and C5-6.  There is a partially imaged T2 hyperintense lesion along the right C6-7 neural foramen which measures at least 5 mm.    At C2-3 there is bilateral facet degenerative changes especially on the left.  Partial effacement ventral CSF space.  Moderate left neural foraminal narrowing.  At C3-4 minimal posterior disc bulge.  Bilateral uncovertebral spurs.  Bilateral facet degenerative changes.  Complete effacement ventral and partial effacement dorsal CSF space.  Moderate right neural foraminal narrowing.  At C4-5 posterior disc osteophyte complex and bilateral uncovertebral spurs and left facet degenerative change.  Partial effacement ventral and dorsal CSF space.  Mild right and severe left neural foraminal narrowing.  At C5-6 posterior disc osteophyte complex asymmetric to the right and bilateral uncovertebral spurs.  Complete effacement ventral CSF space and partial effacement dorsal CSF space.  At C6-7 posterior disc osteophyte complex asymmetric to the right and bilateral uncovertebral spurs.  Near complete effacement ventral CSF space.  Mild right neural foraminal narrowing.  At T3-4 and T4-5 there is right facet degenerative change, seen only on sagittal.    EMG/NCS 7/28/21  Impression: This is a slightly abnormal EMG of the left upper extremity.  There is electrophysiologic evidence most consistent with a very mild, left, C6 radiculopathy without active denervation.  There is no evidence of any other radiculopathy, focal neuropathy, peripheral neuropathy, or plexopathy on this study.  The patient's symptoms of intermittent sensory changes could be secondary to intermittent compression not resulting in demyelination or axonal nerve injury    MRI cervical spine 07/23/2022  FINDINGS:  Lateral curvature of the cervical spine convex to the right.  No fracture or destructive lesion.  Spinal cord demonstrates normal  signal throughout all allowing for artifact.     C2-C3 demonstrates 1 or 2 mm anterolisthesis, moderate left-sided facet arthrosis with osteophytic hypertrophy, mild left-sided osseous foraminal narrowing.  C3-C4 demonstrates mild disc bulge, moderate right-sided facet arthrosis, mild moderate right-sided foraminal narrowing and possible nerve root contact.  C4-C5 demonstrates moderate disc space narrowing, osteophytic lipping and mild uncovertebral spurring along the posterior disc margin, desiccated disc bulge superimposed, contact of the anterior spinal cord, mild left-sided facet arthrosis, moderate left-sided foraminal narrowing with suspected nerve root contact possible impingement.  C5-C6 demonstrates moderate disc space narrowing, severe broad-based disc bulge with anterior spinal cord contact, suspected central protrusion type herniation extending 2 mm beyond the disc plane, no significant foraminal narrowing.  C6-C7 demonstrates moderate disc space narrowing, moderate broad-based desiccated disc bulge, moderate foraminal narrowing on the right with nerve root contact.  C7-T1 demonstrates no significant disc bulge, herniation, spinal canal narrowing, foraminal narrowing.     Impression:     Multilevel disc space narrowing and disc bulging most significant at C4-C5 through C6-C7.  Suspected superimposed desiccated central protrusion type herniation C5-C6 with anterior spinal cord contact.     Multilevel foraminal narrowing, likely most significant at C4-C5 on the left.     Minimal interval change from the prior exam.    MRI thoracic spine 07/23/2022  FINDINGS:  No fracture or destructive lesion evident.  Vertebral body height is maintained.  There is lateral curvature of the upper thoracic region convex to the left, lower midthoracic region convex to the right.  Spinal cord demonstrates normal signal throughout.     Mild facet arthrosis noted at the upper thoracic levels.  Mild facet arthrosis noted along  concave margin of the curvature at the mid to upper thoracic levels.  No significant foraminal narrowing evident.  Disc spaces demonstrate mild narrowing at the lower thoracic segments.  No significant disc bulge, herniation at any level.  No significant spinal canal narrowing at any level.     Paraspinal soft tissues and visualized intrathoracic structures are unremarkable.  There is a left renal cyst incidentally noted.     MRI lumbar spine 07/23/2022  FINDINGS:  No fracture or destructive lesion evident.  Distal spinal cord and conus are grossly normal.     L1-L2 demonstrates near normal disc space signal and height without significant disc bulge, herniation, spinal canal narrowing, foraminal narrowing.  L2-L3 demonstrates mild disc space narrowing and desiccation, mild disc bulging asymmetric to the left, no significant foraminal narrowing.  L3-L4 demonstrates mild disc space narrowing asymmetric to the left, suspected broad-based left posterolateral protrusion type herniation extending 3-4 mm beyond the disc plane, left-sided lateral recess narrowing with contact of the descending left L4 nerve root, mild left-sided foraminal narrowing with extraforaminal nerve root contact.  Mild foraminal narrowing on the right.  L4-5 demonstrates mild disc space narrowing, 2 mm anterolisthesis, moderate disc bulge, moderate right-sided facet arthrosis with osteophytic hypertrophy without edema, minimal foraminal narrowing.  L5-S1 demonstrates near normal disc space signal, 1 or 2 mm anterolisthesis, mild lateral disc bulge, severe left-sided facet arthrosis with osteophytic hypertrophy without significant edema, mild left-sided lateral recess narrowing, moderate left-sided foraminal narrowing with nerve root distortion.      Labs:  BMP  Lab Results   Component Value Date     04/27/2022    K 4.1 04/27/2022     04/27/2022    CO2 28 04/27/2022    BUN 17 04/27/2022    CREATININE 0.75 04/27/2022    CALCIUM 9.2  04/27/2022    ANIONGAP 5 (L) 04/27/2022    ESTGFRAFRICA >60 04/27/2022    EGFRNONAA >60 04/27/2022     Lab Results   Component Value Date    ALT 16 04/27/2022    AST 29 04/27/2022    ALKPHOS 97 04/27/2022    BILITOT 0.4 04/27/2022       Assessment:   Problem List Items Addressed This Visit        Neuro    Cervical radiculopathy - Primary    Relevant Medications    DULoxetine (CYMBALTA) 30 MG capsule       Orthopedic    Neck pain      Other Visit Diagnoses     Cervical spondylosis              67 y.o. year old female with PMH CAD, h/o CVA on plavix, HTN, presents to the office with neck pain.  She's had this pain for about the past year.  Today her pain is 6/10, constant, aching, burning, numb, radiating down both of her arms.  She reports numbness and weakness.  Denies any bowel/bladder dysfunction    7/29/2022: Aye Montanez returns to the clinic for follow up.  Since our last visit she has follow-up with Neurosurgery.  During the time of the appointment with Neurosurgery she was having worsening bowel incontinence, and continued pain.  As result she had further imaging done of her MRI brain, MRI cervical spine, MRI thoracic spine, and MRI lumbar spine.  Neurosurgery reviewed the imaging and did not recommend any surgical interventions for her cervical spine at this time.  Today she continues report neck pain with radiation into bilateral shoulders and down her left arm.  This pain is constant, 6-8/10, worse with activities.  She continues to have intermittent numbness in her left arm.  She denies any sugey weakness.  She has been continuing to take Lyrica and attending formal physical therapy without significant relief of her pain.  This pain continues to limit her mobility, interfere with her ADLs and is limiting her quality of life.  She has a history of cerebrovascular accident and is on chronic anticoagulation.       - on exam she has 5-/5 left shoulder abduction, full strength in lower extremities intact  sensation to light touch b/l C4-T1 and L2-S1 bilaterally.  brisk 2+ b/l Bicep, tricep, BR and patella Luna positive bilaterally  - her pain continues to limit her mobility and greatly reduce her quality of life.  - I reviewed her most recent imaging of her cervical, thoracic and lumbar spine.  I do not see any significant central canal narrowing or spinal cord signal changes that would indicate her new onset bowel continence.   - I believe she is having more urge incontinence as she reports constant diarrhea.  The timing of onset aligns with her trial of Lyrica.  I believe this medication is causing her to have diarrhea and as result she is having issues getting to the bathroom in time.  She does report she continues to feel the urge to need to have a bowel movement.  She denies any saddle anesthesia.  We will discontinue the Lyrica at this time and see how her symptoms play out.  - she has recently seen Neurosurgery and they do not recommend any surgical interventions at this time.  - EMG in 2021 showed a very mild, left, C6 radiculopathy without active denervation  - she continues to attend formal physical therapy without significant relief.  - I would like to start her on a trial of Cymbalta for the neuropathic component of her pain.  We discussed tapering off the Lyrica over the next week then discontinuing.  No new medications for the next 2 weeks and then following that on the 3rd week start the trial of Cymbalta.  Discussed with her to take the Cymbalta once daily for the 1st week and then twice daily going forward.  - we discussed potentially a trial of opiates however she has a family history of mental health issues, suicide and drug addiction and would like to avoid opiates at this time.  - she has tried tramadol in the past without significant relief.  - she is on chronic anticoagulation due to multiple CVA/TIA's  - will follow-up in 6 weeks to see how she is tolerating the decrease in Lyrica and the  trial of Cymbalta.      : Reviewed     The above note was completed, in part, with the aid of Dragon dictation software/hardware. Translation errors may be present.    The total time spent for evaluation and management on 07/29/2022 including reviewing separately obtained history, performing a medically appropriate exam and evaluation, documenting clinical information in the health record, independently interpreting results and communicating them to the patient/family/caregiver, and ordering medications/tests/procedures was between 40-54 minutes.

## 2022-08-02 ENCOUNTER — OFFICE VISIT (OUTPATIENT)
Dept: FAMILY MEDICINE | Facility: CLINIC | Age: 67
End: 2022-08-02
Payer: MEDICARE

## 2022-08-02 VITALS
WEIGHT: 223.56 LBS | TEMPERATURE: 98 F | SYSTOLIC BLOOD PRESSURE: 110 MMHG | HEIGHT: 67 IN | OXYGEN SATURATION: 98 % | DIASTOLIC BLOOD PRESSURE: 68 MMHG | HEART RATE: 66 BPM | BODY MASS INDEX: 35.09 KG/M2

## 2022-08-02 DIAGNOSIS — E78.5 DYSLIPIDEMIA: Primary | ICD-10-CM

## 2022-08-02 DIAGNOSIS — E03.4 HYPOTHYROIDISM DUE TO ACQUIRED ATROPHY OF THYROID: ICD-10-CM

## 2022-08-02 DIAGNOSIS — F32.0 CURRENT MILD EPISODE OF MAJOR DEPRESSIVE DISORDER WITHOUT PRIOR EPISODE: ICD-10-CM

## 2022-08-02 PROCEDURE — 1125F AMNT PAIN NOTED PAIN PRSNT: CPT | Mod: CPTII,S$GLB,, | Performed by: INTERNAL MEDICINE

## 2022-08-02 PROCEDURE — 1159F MED LIST DOCD IN RCRD: CPT | Mod: CPTII,S$GLB,, | Performed by: INTERNAL MEDICINE

## 2022-08-02 PROCEDURE — 3008F PR BODY MASS INDEX (BMI) DOCUMENTED: ICD-10-PCS | Mod: CPTII,S$GLB,, | Performed by: INTERNAL MEDICINE

## 2022-08-02 PROCEDURE — 3078F DIAST BP <80 MM HG: CPT | Mod: CPTII,S$GLB,, | Performed by: INTERNAL MEDICINE

## 2022-08-02 PROCEDURE — 3288F PR FALLS RISK ASSESSMENT DOCUMENTED: ICD-10-PCS | Mod: CPTII,S$GLB,, | Performed by: INTERNAL MEDICINE

## 2022-08-02 PROCEDURE — 3074F PR MOST RECENT SYSTOLIC BLOOD PRESSURE < 130 MM HG: ICD-10-PCS | Mod: CPTII,S$GLB,, | Performed by: INTERNAL MEDICINE

## 2022-08-02 PROCEDURE — 1101F PT FALLS ASSESS-DOCD LE1/YR: CPT | Mod: CPTII,S$GLB,, | Performed by: INTERNAL MEDICINE

## 2022-08-02 PROCEDURE — 99999 PR PBB SHADOW E&M-EST. PATIENT-LVL IV: CPT | Mod: PBBFAC,,, | Performed by: INTERNAL MEDICINE

## 2022-08-02 PROCEDURE — 1159F PR MEDICATION LIST DOCUMENTED IN MEDICAL RECORD: ICD-10-PCS | Mod: CPTII,S$GLB,, | Performed by: INTERNAL MEDICINE

## 2022-08-02 PROCEDURE — 1101F PR PT FALLS ASSESS DOC 0-1 FALLS W/OUT INJ PAST YR: ICD-10-PCS | Mod: CPTII,S$GLB,, | Performed by: INTERNAL MEDICINE

## 2022-08-02 PROCEDURE — 3078F PR MOST RECENT DIASTOLIC BLOOD PRESSURE < 80 MM HG: ICD-10-PCS | Mod: CPTII,S$GLB,, | Performed by: INTERNAL MEDICINE

## 2022-08-02 PROCEDURE — 99214 PR OFFICE/OUTPT VISIT, EST, LEVL IV, 30-39 MIN: ICD-10-PCS | Mod: S$GLB,,, | Performed by: INTERNAL MEDICINE

## 2022-08-02 PROCEDURE — 3008F BODY MASS INDEX DOCD: CPT | Mod: CPTII,S$GLB,, | Performed by: INTERNAL MEDICINE

## 2022-08-02 PROCEDURE — 99214 OFFICE O/P EST MOD 30 MIN: CPT | Mod: S$GLB,,, | Performed by: INTERNAL MEDICINE

## 2022-08-02 PROCEDURE — 99999 PR PBB SHADOW E&M-EST. PATIENT-LVL IV: ICD-10-PCS | Mod: PBBFAC,,, | Performed by: INTERNAL MEDICINE

## 2022-08-02 PROCEDURE — 3074F SYST BP LT 130 MM HG: CPT | Mod: CPTII,S$GLB,, | Performed by: INTERNAL MEDICINE

## 2022-08-02 PROCEDURE — 1125F PR PAIN SEVERITY QUANTIFIED, PAIN PRESENT: ICD-10-PCS | Mod: CPTII,S$GLB,, | Performed by: INTERNAL MEDICINE

## 2022-08-02 PROCEDURE — 3288F FALL RISK ASSESSMENT DOCD: CPT | Mod: CPTII,S$GLB,, | Performed by: INTERNAL MEDICINE

## 2022-08-02 NOTE — PROGRESS NOTES
Subjective:       Patient ID: Aye Montanez is a 67 y.o. female.    Chief Complaint: Annual Exam    Many issues that seem to be wearing her down.    Depression - uncontrolled likely from multiple health issues not being resolved.  Will start Cymbalta soon.  Open to therapy.  Previously weaned off Lexapro.     hypothryoid - on very mild dose, but TSH elevated off this. Needs recheckl    HLD - was uncontrolled, so increased statin.  Needs recheck.  CAD - s/p stents 2016.  NM stress test normal No chest pain.       Sjogrens - Dr Bee  + Joint pain.    ILD? - still having SOB with exertional hypoxia; residual dry cough; January had bronchitis and pleurisy.  Dr Clive Mendozanauds     TIA x3 2021.  Last year episode of altered mental status.  Dr Mohan, Neurologist put back Plavix until likely April.  Initially thought to have been a CVA and received tPa.  States that neurology did not feel it was a stroke.   Complex migraine? - Episodes of muscle spasms in face, slurred voice, and right leg twisting uncontrollably.      Neck vertebrae.  Left arm numbness in forearm distal.  Pain in left neck causing headaches.  MRI showed disk herniation touching anterior spinal cord at C4,5 and nerve impingement to the left there as well.  Seeing pain management so far without relief.  Refuses pain medicine for now with FH drug abuse.  Sent to neurosurgery, but told did not need surgery?      Episodes of severe abdominal bloating with pain.    Now having bowel (and urine) incontinence.  Has apt with GI.  Related to above?             Review of Systems   Constitutional: Positive for fatigue. Negative for appetite change and fever.   HENT: Negative for nosebleeds and trouble swallowing.    Eyes: Negative for discharge and visual disturbance.   Respiratory: Negative for choking and shortness of breath.    Cardiovascular: Negative for chest pain and palpitations.   Gastrointestinal: Positive for abdominal distention, abdominal pain and  nausea. Negative for vomiting.   Musculoskeletal: Positive for arthralgias and back pain. Negative for joint swelling.   Skin: Negative for rash and wound.   Neurological: Negative for dizziness and syncope.   Psychiatric/Behavioral: Negative for confusion and dysphoric mood.       Objective:      Vitals:    08/02/22 1153   BP: 110/68   Pulse: 66   Temp: 98.1 °F (36.7 °C)     Physical Exam  Vitals reviewed.   Eyes:      Conjunctiva/sclera: Conjunctivae normal.   Cardiovascular:      Rate and Rhythm: Normal rate and regular rhythm.   Pulmonary:      Effort: Pulmonary effort is normal.      Breath sounds: Normal breath sounds.   Abdominal:      General: Bowel sounds are increased.      Palpations: Abdomen is soft.      Tenderness: There is no abdominal tenderness.   Musculoskeletal:      Cervical back: Normal range of motion.      Comments: Normal ROM bilateral    Skin:     General: Skin is warm and dry.   Neurological:      Cranial Nerves: No cranial nerve deficit (grossly intact).   Psychiatric:      Comments: Alert and orientated           Assessment:       1. Dyslipidemia    2. Hypothyroidism due to acquired atrophy of thyroid        Plan:       Dyslipidemia    Hypothyroidism due to acquired atrophy of thyroid            Medication List with Changes/Refills   Current Medications    ACETAMINOPHEN (TYLENOL) 500 MG TABLET    Take 1,000 mg by mouth every 6 (six) hours as needed for Pain.    BELIMUMAB (BENLYSTA) 200 MG/ML SYRG    Inject 1 mL (200 mg total) into the skin once a week.    CLOPIDOGREL (PLAVIX) 75 MG TABLET    Take 1 tablet (75 mg total) by mouth once daily. Take with aspirin 81 mg for 21 days. Then, stop aspirin and take just clopidogrel thereafter.    DULOXETINE (CYMBALTA) 30 MG CAPSULE    Take 1 capsule (30 mg total) by mouth 2 (two) times daily.    FLUOROMETHOLONE 0.1% (FML) 0.1 % DRPS        FUROSEMIDE (LASIX) 20 MG TABLET    Take 1 tablet (20 mg total) by mouth once daily.    HYDROCORTISONE 2.5 %  "CREAM    Apply topically 2 (two) times daily.    LEVOTHYROXINE (SYNTHROID) 25 MCG TABLET    Take 1 tablet (25 mcg total) by mouth before breakfast.    MAGNESIUM OXIDE (MAG-OX) 400 MG (241.3 MG MAGNESIUM) TABLET    Take 400 mg by mouth once daily.    OMEPRAZOLE (PRILOSEC) 20 MG CAPSULE    Take 1 capsule (20 mg total) by mouth once daily.    POTASSIUM CHLORIDE SA (K-DUR,KLOR-CON) 20 MEQ TABLET    Take 1 tablet (20 mEq total) by mouth once daily.    ROSUVASTATIN (CRESTOR) 40 MG TAB    Take 1 tablet (40 mg total) by mouth once daily.    SYRINGE WITH NEEDLE 3 ML 22 GAUGE X 3/4" SYRG    6 Syringes by Misc.(Non-Drug; Combo Route) route every 30 days. Use to inject cyanocobalamin once every 30 days.    ZINC GLUCONATE 50 MG TABLET    Take 50 mg by mouth once daily.   Discontinued Medications    DIAZEPAM (VALIUM) 5 MG TABLET    Take 1 tablet (5 mg total) by mouth once as needed (30 min prior to surgery).    PREGABALIN (LYRICA) 50 MG CAPSULE    Take 1 capsule (50 mg total) by mouth 2 (two) times daily.       Continue current management and monitor.    Counseled on regular exercise, maintenance of a healthy weight, balanced diet rich in fruits/vegetables and lean protein, and avoidance of unhealthy habits like smoking and excessive alcohol intake.   Also, counseled on importance of being compliant with medication, health appointments, diet and exercise.     Follow up in about 6 months (around 2/2/2023).  tsh and flp will get a Luther in couple weeks      "

## 2022-08-03 ENCOUNTER — PATIENT MESSAGE (OUTPATIENT)
Dept: PSYCHIATRY | Facility: CLINIC | Age: 67
End: 2022-08-03
Payer: MEDICARE

## 2022-08-08 ENCOUNTER — OFFICE VISIT (OUTPATIENT)
Dept: GASTROENTEROLOGY | Facility: CLINIC | Age: 67
End: 2022-08-08
Payer: MEDICARE

## 2022-08-08 VITALS — HEIGHT: 67 IN | WEIGHT: 225.06 LBS | BODY MASS INDEX: 35.33 KG/M2

## 2022-08-08 DIAGNOSIS — R10.13 EPIGASTRIC PAIN: Primary | ICD-10-CM

## 2022-08-08 DIAGNOSIS — Z90.49 S/P CHOLECYSTECTOMY: ICD-10-CM

## 2022-08-08 DIAGNOSIS — R14.0 ABDOMINAL BLOATING: ICD-10-CM

## 2022-08-08 DIAGNOSIS — K64.8 INTERNAL HEMORRHOID: ICD-10-CM

## 2022-08-08 DIAGNOSIS — K58.2 IRRITABLE BOWEL SYNDROME WITH BOTH CONSTIPATION AND DIARRHEA: ICD-10-CM

## 2022-08-08 DIAGNOSIS — Z86.010 HISTORY OF COLON POLYPS: ICD-10-CM

## 2022-08-08 DIAGNOSIS — R15.2 INCONTINENCE OF FECES WITH FECAL URGENCY: ICD-10-CM

## 2022-08-08 DIAGNOSIS — R11.0 NAUSEA: ICD-10-CM

## 2022-08-08 DIAGNOSIS — Z79.01 CURRENT USE OF ANTICOAGULANT THERAPY: ICD-10-CM

## 2022-08-08 DIAGNOSIS — R15.9 INCONTINENCE OF FECES WITH FECAL URGENCY: ICD-10-CM

## 2022-08-08 PROCEDURE — 1159F MED LIST DOCD IN RCRD: CPT | Mod: CPTII,S$GLB,, | Performed by: NURSE PRACTITIONER

## 2022-08-08 PROCEDURE — 1101F PR PT FALLS ASSESS DOC 0-1 FALLS W/OUT INJ PAST YR: ICD-10-PCS | Mod: CPTII,S$GLB,, | Performed by: NURSE PRACTITIONER

## 2022-08-08 PROCEDURE — 3008F PR BODY MASS INDEX (BMI) DOCUMENTED: ICD-10-PCS | Mod: CPTII,S$GLB,, | Performed by: NURSE PRACTITIONER

## 2022-08-08 PROCEDURE — 99999 PR PBB SHADOW E&M-EST. PATIENT-LVL III: ICD-10-PCS | Mod: PBBFAC,,, | Performed by: NURSE PRACTITIONER

## 2022-08-08 PROCEDURE — 3288F FALL RISK ASSESSMENT DOCD: CPT | Mod: CPTII,S$GLB,, | Performed by: NURSE PRACTITIONER

## 2022-08-08 PROCEDURE — 1159F PR MEDICATION LIST DOCUMENTED IN MEDICAL RECORD: ICD-10-PCS | Mod: CPTII,S$GLB,, | Performed by: NURSE PRACTITIONER

## 2022-08-08 PROCEDURE — 99214 PR OFFICE/OUTPT VISIT, EST, LEVL IV, 30-39 MIN: ICD-10-PCS | Mod: S$GLB,,, | Performed by: NURSE PRACTITIONER

## 2022-08-08 PROCEDURE — 3008F BODY MASS INDEX DOCD: CPT | Mod: CPTII,S$GLB,, | Performed by: NURSE PRACTITIONER

## 2022-08-08 PROCEDURE — 99999 PR PBB SHADOW E&M-EST. PATIENT-LVL III: CPT | Mod: PBBFAC,,, | Performed by: NURSE PRACTITIONER

## 2022-08-08 PROCEDURE — 1160F RVW MEDS BY RX/DR IN RCRD: CPT | Mod: CPTII,S$GLB,, | Performed by: NURSE PRACTITIONER

## 2022-08-08 PROCEDURE — 99214 OFFICE O/P EST MOD 30 MIN: CPT | Mod: S$GLB,,, | Performed by: NURSE PRACTITIONER

## 2022-08-08 PROCEDURE — 3288F PR FALLS RISK ASSESSMENT DOCUMENTED: ICD-10-PCS | Mod: CPTII,S$GLB,, | Performed by: NURSE PRACTITIONER

## 2022-08-08 PROCEDURE — 1160F PR REVIEW ALL MEDS BY PRESCRIBER/CLIN PHARMACIST DOCUMENTED: ICD-10-PCS | Mod: CPTII,S$GLB,, | Performed by: NURSE PRACTITIONER

## 2022-08-08 PROCEDURE — 1101F PT FALLS ASSESS-DOCD LE1/YR: CPT | Mod: CPTII,S$GLB,, | Performed by: NURSE PRACTITIONER

## 2022-08-08 RX ORDER — HYDROCORTISONE 25 MG/G
CREAM TOPICAL 2 TIMES DAILY
Qty: 1 EACH | Refills: 1 | Status: SHIPPED | OUTPATIENT
Start: 2022-08-08

## 2022-08-08 RX ORDER — DICYCLOMINE HYDROCHLORIDE 10 MG/1
10 CAPSULE ORAL
Qty: 120 CAPSULE | Refills: 0 | Status: SHIPPED | OUTPATIENT
Start: 2022-08-08 | End: 2022-09-07

## 2022-08-08 NOTE — PROGRESS NOTES
"Subjective:       Patient ID: Aye Montanez is a 67 y.o. female, Body mass index is 35.25 kg/m².    Chief Complaint: Abdominal Pain      Established patient of Dr. Del Castillo & myself.     Abdominal Pain  This is a chronic problem. The current episode started more than 1 year ago. The onset quality is sudden. The problem occurs intermittently. Duration: lasts for hours- weeks. The problem has been gradually worsening (over the past couple months). The pain is located in the epigastric region. The pain is severe. The quality of the pain is cramping (describes as "twisting"). The abdominal pain does not radiate. Associated symptoms include belching, constipation (Hx of constipation; denies currently), diarrhea (occurs 5-10 times per day; describes stool as type 1-7 on bristol scale; associated with fecal urgency and fecal incontinence; denies recent antibiotics; tried OTC Imodium PRN- no improvement; tried OTC Phazyme PRN- somewhat helps), flatus and nausea. Pertinent negatives include no anorexia, dysuria, fever, frequency, hematochezia, melena, vomiting or weight loss. Associated symptoms comments: Associated symptoms also include abdominal bloating. The pain is aggravated by eating. The pain is relieved by nothing. She has tried proton pump inhibitors (Currently: Omeprazole 20 mg once daily- no improvement) for the symptoms. Prior diagnostic workup includes GI consult, ultrasound, lower endoscopy and upper endoscopy. Her past medical history is significant for abdominal surgery (Hx of appendectomy and hysterectomy), gallstones (Hx of cholecystectomy) and GERD (Hx of gastritis). There is no history of colon cancer, Crohn's disease, irritable bowel syndrome, pancreatitis, PUD or ulcerative colitis (Of note: patient started new medication, Benlysta, in 5/2022).     Review of Systems   Constitutional: Negative for appetite change, fever, unexpected weight change and weight loss.   HENT: Negative for trouble " swallowing (Improved since esophageal dilation).    Respiratory: Negative for cough and shortness of breath.    Cardiovascular: Negative for chest pain.   Gastrointestinal: Positive for abdominal distention, abdominal pain, constipation (Hx of constipation; denies currently), diarrhea (occurs 5-10 times per day; describes stool as type 1-7 on bristol scale; associated with fecal urgency and fecal incontinence; denies recent antibiotics; tried OTC Imodium PRN- no improvement; tried OTC Phazyme PRN- somewhat helps), flatus and nausea. Negative for anal bleeding, anorexia, blood in stool, hematochezia, melena, rectal pain and vomiting.   Genitourinary: Negative for difficulty urinating, dysuria and frequency.   Musculoskeletal: Negative for gait problem.   Skin: Negative for rash.   Neurological: Negative for speech difficulty.   Psychiatric/Behavioral: Negative for confusion.       Past Medical History:   Diagnosis Date    Sjogren's syndrome 12/05/2016    Systemic lupus erythematosus, organ or system involvement unspecified       Past Surgical History:   Procedure Laterality Date    APPENDECTOMY      CHOLECYSTECTOMY      COLONOSCOPY      COLONOSCOPY N/A 6/9/2022    Procedure: COLONOSCOPY;  Surgeon: Eugene Del Castillo MD;  Location: [facility] ENDO;  Service: Endoscopy;  Laterality: N/A;    CORONARY STENT PLACEMENT      10/2017    EPIDURAL STEROID INJECTION INTO CERVICAL SPINE N/A 6/10/2022    Procedure: Injection-steroid-epidural-cervical;  Surgeon: Dickson Oshea MD;  Location: [facility] OR;  Service: Pain Management;  Laterality: N/A;    ESOPHAGOGASTRODUODENOSCOPY N/A 6/9/2022    Procedure: EGD (ESOPHAGOGASTRODUODENOSCOPY);  Surgeon: Eugene Del Castillo MD;  Location: [facility] ENDO;  Service: Endoscopy;  Laterality: N/A;    FRACTURE SURGERY Right     foot    HYSTERECTOMY      1984    KNEE ARTHROSCOPY Right 2/2016    OOPHORECTOMY Bilateral     hyst. 1984    TOTAL REDUCTION MAMMOPLASTY Bilateral     1999    UPPER  GASTROINTESTINAL ENDOSCOPY        Family History   Problem Relation Age of Onset    Cancer Mother     Ovarian cancer Mother 40    Cancer Father         esophageal    Esophageal cancer Father     Stroke Maternal Aunt     Rheum arthritis Maternal Aunt     Rheum arthritis Paternal Uncle     Breast cancer Maternal Cousin     Breast cancer Maternal Cousin     Glaucoma Neg Hx     Macular degeneration Neg Hx       Wt Readings from Last 10 Encounters:   08/08/22 102.1 kg (225 lb 1.4 oz)   08/02/22 101.4 kg (223 lb 8.7 oz)   07/29/22 99.7 kg (219 lb 14.5 oz)   07/20/22 101.6 kg (224 lb)   07/18/22 95.7 kg (210 lb 15.7 oz)   06/29/22 95.7 kg (211 lb)   06/24/22 98.5 kg (217 lb 4.2 oz)   06/06/22 98 kg (216 lb)   05/13/22 99.2 kg (218 lb 9.4 oz)   05/09/22 99.8 kg (220 lb 0.3 oz)     Lab Results   Component Value Date    WBC 3.62 (L) 04/27/2022    HGB 11.9 (L) 04/27/2022    HCT 39.0 04/27/2022    MCV 82 04/27/2022     04/27/2022     CMP  Sodium   Date Value Ref Range Status   04/27/2022 140 136 - 145 mmol/L Final     Potassium   Date Value Ref Range Status   04/27/2022 4.1 3.5 - 5.1 mmol/L Final     Chloride   Date Value Ref Range Status   04/27/2022 107 95 - 110 mmol/L Final     CO2   Date Value Ref Range Status   04/27/2022 28 22 - 31 mmol/L Final     Glucose   Date Value Ref Range Status   04/27/2022 106 70 - 110 mg/dL Final     Comment:     The ADA recommends the following guidelines for fasting glucose:    Normal:       less than 100 mg/dL    Prediabetes:  100 mg/dL to 125 mg/dL    Diabetes:     126 mg/dL or higher       BUN   Date Value Ref Range Status   04/27/2022 17 7 - 18 mg/dL Final   12/01/2017 19 7 - 21 mg/dL Final     Creatinine   Date Value Ref Range Status   04/27/2022 0.75 0.50 - 1.40 mg/dL Final     Calcium   Date Value Ref Range Status   04/27/2022 9.2 8.4 - 10.2 mg/dL Final     Total Protein   Date Value Ref Range Status   04/27/2022 7.6 6.0 - 8.4 g/dL Final     Albumin   Date Value Ref  Range Status   04/27/2022 4.2 3.5 - 5.2 g/dL Final     Total Bilirubin   Date Value Ref Range Status   04/27/2022 0.4 0.2 - 1.3 mg/dL Final     Alkaline Phosphatase   Date Value Ref Range Status   04/27/2022 97 38 - 145 U/L Final     AST   Date Value Ref Range Status   04/27/2022 29 14 - 36 U/L Final     ALT   Date Value Ref Range Status   04/27/2022 16 0 - 35 U/L Final     Anion Gap   Date Value Ref Range Status   04/27/2022 5 (L) 8 - 16 mmol/L Final   12/01/2017 19 (H) 9 - 18 mEq/L Final     eGFR if    Date Value Ref Range Status   04/27/2022 >60 >60 mL/min/1.73 m^2 Final     eGFR if non    Date Value Ref Range Status   04/27/2022 >60 >60 mL/min/1.73 m^2 Final     Comment:     Calculation used to obtain the estimated glomerular filtration  rate (eGFR) is the CKD-EPI equation.        Lab Results   Component Value Date    AMYLASE 92 12/05/2012     Lab Results   Component Value Date    LIPASE 17 07/09/2015     Lab Results   Component Value Date    LIPASERES 163 08/27/2021             Reviewed prior medical records including radiology report of US of abdomen 9/23/21 & endoscopy history (see surgical history).     Objective:      Physical Exam  Constitutional:       General: She is not in acute distress.     Appearance: She is well-developed.   HENT:      Head: Normocephalic.      Right Ear: Hearing normal.      Left Ear: Hearing normal.      Nose: Nose normal.      Mouth/Throat:      Comments: Pt wearing mask due to COVID concerns  Eyes:      General: Lids are normal.      Conjunctiva/sclera: Conjunctivae normal.      Pupils: Pupils are equal, round, and reactive to light.   Neck:      Trachea: Trachea normal.   Cardiovascular:      Rate and Rhythm: Normal rate and regular rhythm.      Heart sounds: Normal heart sounds. No murmur heard.  Pulmonary:      Effort: Pulmonary effort is normal. No respiratory distress.      Breath sounds: Normal breath sounds. No stridor. No wheezing.    Abdominal:      General: Bowel sounds are normal. There is no distension.      Palpations: Abdomen is soft. There is no mass.      Tenderness: There is no abdominal tenderness. There is no guarding or rebound.   Musculoskeletal:         General: Normal range of motion.      Cervical back: Normal range of motion.   Skin:     General: Skin is warm and dry.      Findings: No rash.      Comments: Non jaundiced   Neurological:      Mental Status: She is alert and oriented to person, place, and time.   Psychiatric:         Speech: Speech normal.         Behavior: Behavior normal. Behavior is cooperative.           Assessment:       1. Epigastric pain    2. Abdominal bloating    3. Nausea    4. Irritable bowel syndrome with both constipation and diarrhea    5. Incontinence of feces with fecal urgency    6. History of colon polyps    7. Internal hemorrhoid    8. Current use of anticoagulant therapy    9. S/P cholecystectomy           Plan:   All diagnoses and orders for this visit:    Epigastric pain, Abdominal bloating & Nausea  - CT Abdomen Pelvis With Contrast; Future; Expected date: 08/08/2022  - Creatinine, serum; Future; Expected date: 08/08/2022  - Continue Omeprazole 20 mg once daily  - Take PPI in the morning 30 minutes before breakfast  - Recommend to avoid large meals, avoid eating within 3 hours of bedtime, elevate head of bed if nocturnal symptoms are present, smoking cessation (if current smoker), & weight loss (if overweight).   - Recommend minimize/avoid high-fat foods, chocolate, caffeine, citrus, alcohol, & tomato products.  - Advised to avoid/limit use of NSAID's, since they can cause GI upset, bleeding, and/or ulcers. If needed, take with food.    - Recommended OTC simethicone as directed, such as Phazyme or Gas-x  - Discussed with patient about low gas diet: Reduce or eliminate these foods from your diet: Broccoli, Cauliflower, [place] sprouts, Cabbage, Cooked dried beans, Carbonated beverages  (sparkling water, soda, beer, champagne)  - Start: dicyclomine (BENTYL) 10 MG capsule; Take 1 capsule (10 mg total) by mouth before meals and at bedtime as needed (abdominal pain; diarrhea).  Dispense: 120 capsule; Refill: 0    Irritable bowel syndrome with both constipation and diarrhea  - Stool Exam-Ova,Cysts,Parasites; Future; Expected date: 08/08/2022  - Giardia / Cryptosporidum, EIA; Future; Expected date: 08/08/2022  - Stool culture; Future; Expected date: 08/08/2022  - WBC, Stool; Future; Expected date: 08/08/2022  - Occult blood x 1, stool; Future; Expected date: 08/08/2022  - pH, stool; Future; Expected date: 08/08/2022  - Pancreatic elastase, fecal; Future; Expected date: 08/08/2022  - Clostridium difficile EIA; Future; Expected date: 08/08/2022  - Recommended increase fiber in diet, especially soluble fiber since this can help bulk up the stool consistency and may help to slow down how fast the stool goes through the colon and can prevent diarrhea  - Start OTC Benefiber once daily  - Start: dicyclomine (BENTYL) 10 MG capsule; Take 1 capsule (10 mg total) by mouth before meals and at bedtime as needed (abdominal pain; diarrhea).  Dispense: 120 capsule; Refill: 0    Incontinence of feces with fecal urgency   - Recommend high fiber diet to help bulk up the stool, especially soluble fiber since this can help bulk up the stool consistency and may help to slow down how fast the stool goes through the colon and can prevent diarrhea   - Start OTC Benefiber once daily   - Possible referral to general surgery and/or pelvic floor physical therapy, if symptoms persist despite use of above recommendations    History of colon polyps   - Next surveillance colonoscopy due 6/2027    Internal hemorrhoid  - Start: hydrocortisone 2.5 % cream; Apply topically 2 (two) times daily.  Dispense: 1 each; Refill: 1  - Start OTC Benefiber once daily  - Recommend SITZ baths  - Possible referral to colorectal surgery if symptoms  persist    Current use of anticoagulant therapy    S/P cholecystectomy    Recommend follow-up with Dr. Del Castillo in 4-6 weeks for continued evaluation and management.  If no improvement in symptoms or symptoms worsen, call/follow-up at clinic or go to ER.

## 2022-08-09 ENCOUNTER — LAB VISIT (OUTPATIENT)
Dept: LAB | Facility: HOSPITAL | Age: 67
End: 2022-08-09
Attending: INTERNAL MEDICINE
Payer: MEDICARE

## 2022-08-09 DIAGNOSIS — K58.2 IRRITABLE BOWEL SYNDROME WITH BOTH CONSTIPATION AND DIARRHEA: ICD-10-CM

## 2022-08-09 PROCEDURE — 83986 ASSAY PH BODY FLUID NOS: CPT | Performed by: NURSE PRACTITIONER

## 2022-08-09 PROCEDURE — 87427 SHIGA-LIKE TOXIN AG IA: CPT | Performed by: NURSE PRACTITIONER

## 2022-08-09 PROCEDURE — 87045 FECES CULTURE AEROBIC BACT: CPT | Performed by: NURSE PRACTITIONER

## 2022-08-09 PROCEDURE — 82272 OCCULT BLD FECES 1-3 TESTS: CPT | Performed by: NURSE PRACTITIONER

## 2022-08-09 PROCEDURE — 87329 GIARDIA AG IA: CPT | Performed by: NURSE PRACTITIONER

## 2022-08-09 PROCEDURE — 82656 EL-1 FECAL QUAL/SEMIQ: CPT | Performed by: NURSE PRACTITIONER

## 2022-08-09 PROCEDURE — 89055 LEUKOCYTE ASSESSMENT FECAL: CPT | Performed by: NURSE PRACTITIONER

## 2022-08-09 PROCEDURE — 87449 NOS EACH ORGANISM AG IA: CPT | Performed by: NURSE PRACTITIONER

## 2022-08-09 PROCEDURE — 87209 SMEAR COMPLEX STAIN: CPT | Performed by: NURSE PRACTITIONER

## 2022-08-09 PROCEDURE — 87177 OVA AND PARASITES SMEARS: CPT | Performed by: NURSE PRACTITIONER

## 2022-08-09 PROCEDURE — 87046 STOOL CULTR AEROBIC BACT EA: CPT | Performed by: NURSE PRACTITIONER

## 2022-08-10 LAB
C DIFF GDH STL QL: NEGATIVE
C DIFF TOX A+B STL QL IA: NEGATIVE
OB PNL STL: NEGATIVE
WBC #/AREA STL HPF: NORMAL /[HPF]

## 2022-08-11 LAB
E COLI SXT1 STL QL IA: NEGATIVE
E COLI SXT2 STL QL IA: NEGATIVE

## 2022-08-12 LAB
ELASTASE 1, FECAL: >500 MCG/G
PH STL: 7 [PH] (ref 5–8.5)

## 2022-08-13 LAB — BACTERIA STL CULT: NORMAL

## 2022-08-15 ENCOUNTER — TELEPHONE (OUTPATIENT)
Dept: GASTROENTEROLOGY | Facility: CLINIC | Age: 67
End: 2022-08-15
Payer: MEDICARE

## 2022-08-15 NOTE — TELEPHONE ENCOUNTER
----- Message from Jo Valdes NP sent at 8/13/2022  2:11 PM CDT -----  Let patient know her CT of abdomen and pelvis showed unchanged kidney cysts and diverticulosis without evidence of diverticulitis. Recommend a high fiber diet for further management of diverticulosis and continue with previous recommendations.

## 2022-08-17 LAB — O+P STL MICRO: NORMAL

## 2022-09-07 ENCOUNTER — OFFICE VISIT (OUTPATIENT)
Dept: RHEUMATOLOGY | Facility: CLINIC | Age: 67
End: 2022-09-07
Payer: MEDICARE

## 2022-09-07 VITALS
DIASTOLIC BLOOD PRESSURE: 84 MMHG | HEART RATE: 76 BPM | SYSTOLIC BLOOD PRESSURE: 132 MMHG | BODY MASS INDEX: 34.08 KG/M2 | WEIGHT: 217.13 LBS | HEIGHT: 67 IN

## 2022-09-07 DIAGNOSIS — Z79.899 HIGH RISK MEDICATIONS (NOT ANTICOAGULANTS) LONG-TERM USE: ICD-10-CM

## 2022-09-07 DIAGNOSIS — M32.13 OTHER SYSTEMIC LUPUS ERYTHEMATOSUS WITH LUNG INVOLVEMENT: Primary | ICD-10-CM

## 2022-09-07 PROCEDURE — 1125F PR PAIN SEVERITY QUANTIFIED, PAIN PRESENT: ICD-10-PCS | Mod: CPTII,S$GLB,, | Performed by: INTERNAL MEDICINE

## 2022-09-07 PROCEDURE — 3288F PR FALLS RISK ASSESSMENT DOCUMENTED: ICD-10-PCS | Mod: CPTII,S$GLB,, | Performed by: INTERNAL MEDICINE

## 2022-09-07 PROCEDURE — 3288F FALL RISK ASSESSMENT DOCD: CPT | Mod: CPTII,S$GLB,, | Performed by: INTERNAL MEDICINE

## 2022-09-07 PROCEDURE — 1125F AMNT PAIN NOTED PAIN PRSNT: CPT | Mod: CPTII,S$GLB,, | Performed by: INTERNAL MEDICINE

## 2022-09-07 PROCEDURE — 1159F PR MEDICATION LIST DOCUMENTED IN MEDICAL RECORD: ICD-10-PCS | Mod: CPTII,S$GLB,, | Performed by: INTERNAL MEDICINE

## 2022-09-07 PROCEDURE — 3079F PR MOST RECENT DIASTOLIC BLOOD PRESSURE 80-89 MM HG: ICD-10-PCS | Mod: CPTII,S$GLB,, | Performed by: INTERNAL MEDICINE

## 2022-09-07 PROCEDURE — 1159F MED LIST DOCD IN RCRD: CPT | Mod: CPTII,S$GLB,, | Performed by: INTERNAL MEDICINE

## 2022-09-07 PROCEDURE — 99999 PR PBB SHADOW E&M-EST. PATIENT-LVL III: CPT | Mod: PBBFAC,,, | Performed by: INTERNAL MEDICINE

## 2022-09-07 PROCEDURE — 3075F PR MOST RECENT SYSTOLIC BLOOD PRESS GE 130-139MM HG: ICD-10-PCS | Mod: CPTII,S$GLB,, | Performed by: INTERNAL MEDICINE

## 2022-09-07 PROCEDURE — 3008F BODY MASS INDEX DOCD: CPT | Mod: CPTII,S$GLB,, | Performed by: INTERNAL MEDICINE

## 2022-09-07 PROCEDURE — 3075F SYST BP GE 130 - 139MM HG: CPT | Mod: CPTII,S$GLB,, | Performed by: INTERNAL MEDICINE

## 2022-09-07 PROCEDURE — 3008F PR BODY MASS INDEX (BMI) DOCUMENTED: ICD-10-PCS | Mod: CPTII,S$GLB,, | Performed by: INTERNAL MEDICINE

## 2022-09-07 PROCEDURE — 1101F PT FALLS ASSESS-DOCD LE1/YR: CPT | Mod: CPTII,S$GLB,, | Performed by: INTERNAL MEDICINE

## 2022-09-07 PROCEDURE — 1160F RVW MEDS BY RX/DR IN RCRD: CPT | Mod: CPTII,S$GLB,, | Performed by: INTERNAL MEDICINE

## 2022-09-07 PROCEDURE — 99999 PR PBB SHADOW E&M-EST. PATIENT-LVL III: ICD-10-PCS | Mod: PBBFAC,,, | Performed by: INTERNAL MEDICINE

## 2022-09-07 PROCEDURE — 99214 PR OFFICE/OUTPT VISIT, EST, LEVL IV, 30-39 MIN: ICD-10-PCS | Mod: S$GLB,,, | Performed by: INTERNAL MEDICINE

## 2022-09-07 PROCEDURE — 99214 OFFICE O/P EST MOD 30 MIN: CPT | Mod: S$GLB,,, | Performed by: INTERNAL MEDICINE

## 2022-09-07 PROCEDURE — 1160F PR REVIEW ALL MEDS BY PRESCRIBER/CLIN PHARMACIST DOCUMENTED: ICD-10-PCS | Mod: CPTII,S$GLB,, | Performed by: INTERNAL MEDICINE

## 2022-09-07 PROCEDURE — 1101F PR PT FALLS ASSESS DOC 0-1 FALLS W/OUT INJ PAST YR: ICD-10-PCS | Mod: CPTII,S$GLB,, | Performed by: INTERNAL MEDICINE

## 2022-09-07 PROCEDURE — 3079F DIAST BP 80-89 MM HG: CPT | Mod: CPTII,S$GLB,, | Performed by: INTERNAL MEDICINE

## 2022-09-07 ASSESSMENT — ROUTINE ASSESSMENT OF PATIENT INDEX DATA (RAPID3)
FATIGUE SCORE: 1.1
PAIN SCORE: 5
PSYCHOLOGICAL DISTRESS SCORE: 2.2
MDHAQ FUNCTION SCORE: 1.2
PATIENT GLOBAL ASSESSMENT SCORE: 7.5
TOTAL RAPID3 SCORE: 5.5

## 2022-09-07 NOTE — PROGRESS NOTES
"Subjective:          Chief Complaint: Aye Montanez is a 67 y.o. female who had concerns including Disease Management.    HPI:  All events:  Benlysta infusion too costly  Started Benlysta SQ 5/13/22 and continues.   Since then pain is 5/10 hands, legs and ankles.   Working with pain and NSGY for right hand weakness and numbnes, right sided occipital and facial numbness. MRI Brain, c spine, t spine an l spine w/o any clear explaination  Recent c/o diarrhea since 5/2022- working with GI but no abnormality on CT ABD. Held her Lyrica and markedly improved.   Started Cymbalta was started which has helped with her neve pain right face, she notes many of her random "zingers" in her body are lessened.     Since start Benlysta feet, ankles, hands and wrist are better. Less swelling, less stiffness , less pain. And tolerating. Still with fatigue, still with very dry eyes that are inflamed.   I was hoping ESR to be better.   No percogesic. Using tylenol arthritis- twice daily seems to be helping more than it did in the past.     She failed MTX  HCQ was ? But reviewed Meng last note I have and she stopped HCQ 2017 but no notation of maculopathy    Patient is having oligoarticular swelling in hands and feet. She is having peripheral polyarthritis. Severe dryness of her eyes.   She did clarify that Dr. Fan did not see maculopathy but only expressed his concerns about the eye.         Patient is a 67-year-old female she has a history of Sjogren syndrome (estimate start 2015)   To review her Rheum diagnosis:  presumed Sjogrens with ? of SLE  +OSMAN 1:1280 speckled  High titer SSA and SSB with + dry eye and dry mouth  dsDNA neg, Singh neg, RNP neg. C3 and C4 WNL.   + hx of thrombocytopenia, + leukopenia, +polyarthritis, + sicca, +Raynauds  RF and CCP negative.   She has c/o no strength in her hands, knees are painful. Feet burn with standing. No overt swelling of joints with the exception of left voler wrist synovial " cyst.    Diffuse pruritis w/o visible rash.   Trialed HCQ but patient concerned about macula and discontinued. She is followed by Dr. Fan the [facility].  She was taken off HCQ no active maculopathy but she was concerned about ASE. .   Trialed on MTX and failed.  Restasis despite burning back on  +punctal plugs. Did try Xiidra- ? If ever taken this visit.  She notes burning in her eyes.  Photophobia occasional redness to her eyes. burning Restasis no longer tolerable.    Never salagen/Evozac. . She did try xylimelts      Patient developed neurologic events starting 1/2021- but we discontinued nifedipine (Raynaud's) in event this was leading to hypotension. Patient described episode  with disorientation, tingling lips.  repeat episode 5/25/21 with vertigo and near syncope and again 6/2/21 slurred speech, right facial droop, Imagin has been neg. for acute changes. on 6/3/21 similar event but now on left. 8/2021 another episode.   Dr. Mccurdy performed an EMG which showed mild left C6 radiculopathy. MRI showed multilevel degenerative changes and a cyst at C6/7 on the right.   EEG 2o21 WNL.   Neuro dx: complex migrain vs TIA changes ASA to Plavix.   right leg numbness referred to vestibular therapy.   HA: consider occipital nerve block  Cervical spine and left arm symptoms Pain management. trial with Tramadol for now and medrol and if not improved will discuss SONYA once safe to hold Plavix. She elected not to take Lyrica. no reported ASE.     Patient has been having SOB, dizzyness. she has maximized therapy with Pulm with little improvement. Her last PFTs show no diffusion impairment, not hypoxic   PFT Results  02/26/2019: FEV1 2.20 (89%), FEV1/FVC 85, TLC 93%, DLCO 111%    Acute Pleurisy 12/2021 with right chest pain and pain with inspiration. COVID testing neg. Prednisone and Levaquin started. +productive cough.   : CTA neg for PE. but reticulonodular opacities compatible with pneumonitis ANGELIQUE and LLL with  patchy infiltrate in the RML and RLL. Trace right pleural effusion.   Seen with Pulm as of 1/12/22 given azithro   Cardiac: ECHO normal EF, no shunt, The estimated PA systolic pressure is 35 mmHg  completed stress testing with Dr. Peter ([facility]) pt states no new changes. +CAD with stenting 2017.         9/2021: continued neurologic events so we held off on starting Benlysta until completed work up. More recently presented to the ER with possible TIA.  We discontinued nifedipine for possible hypotensive episode are Raynaud's have been stable.   Events surrounding the 1st episode seem to be blood donation bilateral vertebral artery stenosis presenting with nystagmus vertigo and ataxia remark resolved with IV hydration and discontinuation of nifedipine.  Second event on did not involve a similar constellation of symptoms and just involved numbness and tingling.   continues with dizziness, mild SOB pending cardiac w/up with Dr. Peter      1/2021: flare with burning eyes, joint aches and pains and Raynauds persistent for 10 days. She tried hot shower/soak which helped temporarily. Started nifedipine 30mg daily 1/14/21 by 1/22/21 with episode of disorientation and hypotension. Full work up no seizure, no migraine she was aware at the time. Was having dizziness. No motor weakness. Slurred speech. Imaging revealed b/l vetebral arteries at 50%. And no seizure activity.   Off Nifedipine at BP marked improved. Unfortunately very effective for Raynauds.     Submitted for Benlysta with no affordable option for home injection despite [place] support.     S/p viscosupplementation in knees with ortho 3/2018      Component      Latest Ref Rng & Units 8/16/2018   Anti Sm Antibody      0.00 - 19.99 EU 2.85   Anti-Sm Interpretation      Negative Negative   Anti-SSA Antibody      0.00 - 19.99 .02 (H)   Anti-SSA Interpretation      Negative Positive (A)   Anti-SSB Antibody      0.00 - 19.99 .37 (H)   Anti-SSB Interpretation       Negative Positive (A)   Sed Rate      0 - 20 mm/Hr 33 (H)   CRP      0.0 - 8.2 mg/L 3.6   OSMAN Screen      Negative <1:160 Positive (A)   Complement (C-3)      50 - 180 mg/dL 129   Complement (C-4)      11 - 44 mg/dL 22   Complement,Total, Serum      42 - 95 U/mL 85   Rheumatoid Factor      0.0 - 15.0 IU/mL <10.0   CCP Antibodies      <5.0 U/mL <0.5     REVIEW OF SYSTEMS:    Review of Systems   Constitutional:  Positive for malaise/fatigue. Negative for fever and weight loss.   HENT:  Negative for sore throat.    Eyes:  Negative for double vision, photophobia and redness.   Respiratory:  Negative for cough, shortness of breath and wheezing.    Cardiovascular:  Negative for chest pain, palpitations and orthopnea.   Gastrointestinal:  Negative for abdominal pain, constipation and diarrhea.   Genitourinary:  Negative for dysuria, hematuria and urgency.   Musculoskeletal:  Positive for joint pain. Negative for back pain and myalgias.   Skin:  Positive for itching. Negative for rash.   Neurological:  Negative for dizziness, tingling, focal weakness and headaches.   Endo/Heme/Allergies:  Does not bruise/bleed easily.   Psychiatric/Behavioral:  Negative for depression, hallucinations and suicidal ideas.              Objective:            Past Medical History:   Diagnosis Date    Sjogren's syndrome 12/05/2016    Systemic lupus erythematosus, organ or system involvement unspecified      Family History   Problem Relation Age of Onset    Cancer Mother     Ovarian cancer Mother 40    Cancer Father         esophageal    Esophageal cancer Father     Stroke Maternal Aunt     Rheum arthritis Maternal Aunt     Rheum arthritis Paternal Uncle     Breast cancer Maternal Cousin     Breast cancer Maternal Cousin     Glaucoma Neg Hx     Macular degeneration Neg Hx      Social History     Tobacco Use    Smoking status: Never    Smokeless tobacco: Never   Substance Use Topics    Alcohol use: Yes     Comment: rarely    Drug use: No  "        Current Outpatient Medications on File Prior to Visit   Medication Sig Dispense Refill    acetaminophen (TYLENOL) 500 MG tablet Take 1,000 mg by mouth every 6 (six) hours as needed for Pain.      belimumab (BENLYSTA) 200 mg/mL Syrg Inject 1 mL (200 mg total) into the skin once a week. 4 mL 11    clopidogreL (PLAVIX) 75 mg tablet Take 1 tablet (75 mg total) by mouth once daily. Take with aspirin 81 mg for 21 days. Then, stop aspirin and take just clopidogrel thereafter. 90 tablet 3    fluorometholone 0.1% (FML) 0.1 % DrpS       furosemide (LASIX) 20 MG tablet Take 1 tablet (20 mg total) by mouth once daily. 90 tablet 3    hydrocortisone 2.5 % cream Apply topically 2 (two) times daily. 1 each 1    levothyroxine (SYNTHROID) 25 MCG tablet Take 1 tablet (25 mcg total) by mouth before breakfast. 30 tablet 11    magnesium oxide (MAG-OX) 400 mg (241.3 mg magnesium) tablet Take 400 mg by mouth once daily.      omeprazole (PRILOSEC) 20 MG capsule Take 1 capsule (20 mg total) by mouth once daily. 90 capsule 3    potassium chloride SA (K-DUR,KLOR-CON) 20 MEQ tablet Take 1 tablet (20 mEq total) by mouth once daily. 90 tablet 3    rosuvastatin (CRESTOR) 40 MG Tab Take 1 tablet (40 mg total) by mouth once daily. 90 tablet 3    syringe with needle 3 mL 22 gauge x 3/4" Syrg 6 Syringes by Misc.(Non-Drug; Combo Route) route every 30 days. Use to inject cyanocobalamin once every 30 days.      zinc gluconate 50 mg tablet Take 50 mg by mouth once daily.      dicyclomine (BENTYL) 10 MG capsule Take 1 capsule (10 mg total) by mouth before meals and at bedtime as needed (abdominal pain; diarrhea). (Patient not taking: Reported on 9/7/2022) 120 capsule 0    DULoxetine (CYMBALTA) 30 MG capsule Take 1 capsule (30 mg total) by mouth 2 (two) times daily. 60 capsule 02     No current facility-administered medications on file prior to visit.       Vitals:    09/07/22 1421   BP: 132/84   Pulse: 76       Physical Exam:    Physical " Exam  Constitutional:       Appearance: Normal appearance. She is well-developed.   HENT:      Nose: No septal deviation.      Mouth/Throat:      Mouth: No oral lesions.   Eyes:      Conjunctiva/sclera:      Right eye: Right conjunctiva is not injected.      Left eye: Left conjunctiva is not injected.      Pupils: Pupils are equal, round, and reactive to light.   Neck:      Thyroid: No thyroid mass or thyromegaly.      Vascular: No JVD.   Cardiovascular:      Rate and Rhythm: Normal rate and regular rhythm.      Pulses: Normal pulses.      Comments: No edema  Pulmonary:      Effort: Pulmonary effort is normal.      Breath sounds: Normal breath sounds.   Abdominal:      Palpations: Abdomen is soft.   Musculoskeletal:      Right shoulder: Tenderness present. No swelling. Normal range of motion.      Left shoulder: Tenderness present. No swelling. Normal range of motion.      Right elbow: No swelling. Normal range of motion. No tenderness.      Left elbow: No swelling. Normal range of motion. No tenderness.      Right wrist: Tenderness present. No swelling. Normal range of motion.      Left wrist: Tenderness present. No swelling. Normal range of motion.      Right hand: Tenderness present.      Left hand: Tenderness present.      Right hip: Normal range of motion. Normal strength.      Left hip: No tenderness. Normal range of motion.      Right knee: No swelling. Normal range of motion. No tenderness.      Left knee: No swelling. Normal range of motion. No tenderness.      Right ankle: No swelling. No tenderness. Normal range of motion.      Left ankle: No swelling. No tenderness. Normal range of motion.      Comments: Patient has some tenderness on the right MCP no overt synovitis no deformities.  No evidence of Raynaud's today but historical triphasic color change  She has marked crepitation bilateral knees with limitation in flexion she has full extension no laxity within the joint no active effusion.       Lymphadenopathy:      Cervical: No cervical adenopathy.   Skin:     General: Skin is dry.   Neurological:      Deep Tendon Reflexes: Reflexes are normal and symmetric.             Assessment:       No diagnosis found.         Plan:        Other systemic lupus erythematosus with lung involvement  -     C-Reactive Protein; Future; Expected date: 09/07/2022  -     Sedimentation rate; Future; Expected date: 09/07/2022  -     CBC Auto Differential; Future; Expected date: 09/07/2022  -     Comprehensive Metabolic Panel; Future; Expected date: 09/07/2022    High risk medications (not anticoagulants) long-term use  -     C-Reactive Protein; Future; Expected date: 09/07/2022  -     Sedimentation rate; Future; Expected date: 09/07/2022  -     CBC Auto Differential; Future; Expected date: 09/07/2022  -     Comprehensive Metabolic Panel; Future; Expected date: 09/07/2022      Very pleasant 66-year-old female SLE/ Sjogren's 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  Increasing joint stiffness and swelling. We have monitored SANDOVAL/SOB ? Some ILD-CTD.    She was hesitant to try salagen.   Failed MTX with ASE   Discussed the joints, increasing leucopenia, worsening dry eyes, malaise and fatigue. And recent pleurisy unclear if this was infectious but chronic SOB/SANDOVAL despite maximized therapy.    HCQ may need to be re-visited. No confirmed maculopathy it was a rec from [facility] that the drug is a risk. I need to reconsider if this is an option for her.           Quantiferon gold. Is negative.     Start Benlysta with self injection and training today  Labs every 3 months  Off cellcept not tolerating.     No follow-ups on file.        F/u 4 months  Thank you for allowing me to participate in the care of this very pleasant patient.    30 min consultation with greater than 50% spent in counseling, chart review and coordination of care. All questions answered.

## 2022-09-09 ENCOUNTER — OFFICE VISIT (OUTPATIENT)
Dept: PAIN MEDICINE | Facility: CLINIC | Age: 67
End: 2022-09-09
Payer: MEDICARE

## 2022-09-09 VITALS
BODY MASS INDEX: 34.05 KG/M2 | OXYGEN SATURATION: 100 % | WEIGHT: 216.94 LBS | TEMPERATURE: 72 F | HEIGHT: 67 IN | SYSTOLIC BLOOD PRESSURE: 152 MMHG | DIASTOLIC BLOOD PRESSURE: 65 MMHG

## 2022-09-09 DIAGNOSIS — M54.12 CERVICAL RADICULOPATHY: ICD-10-CM

## 2022-09-09 DIAGNOSIS — M54.12 CERVICAL RADICULOPATHY: Primary | ICD-10-CM

## 2022-09-09 DIAGNOSIS — M54.2 NECK PAIN: Primary | ICD-10-CM

## 2022-09-09 DIAGNOSIS — M47.812 CERVICAL SPONDYLOSIS: ICD-10-CM

## 2022-09-09 PROCEDURE — 1159F PR MEDICATION LIST DOCUMENTED IN MEDICAL RECORD: ICD-10-PCS | Mod: CPTII,S$GLB,,

## 2022-09-09 PROCEDURE — 1101F PT FALLS ASSESS-DOCD LE1/YR: CPT | Mod: CPTII,S$GLB,,

## 2022-09-09 PROCEDURE — 99999 PR PBB SHADOW E&M-EST. PATIENT-LVL III: CPT | Mod: PBBFAC,,,

## 2022-09-09 PROCEDURE — 1125F PR PAIN SEVERITY QUANTIFIED, PAIN PRESENT: ICD-10-PCS | Mod: CPTII,S$GLB,,

## 2022-09-09 PROCEDURE — 3008F BODY MASS INDEX DOCD: CPT | Mod: CPTII,S$GLB,,

## 2022-09-09 PROCEDURE — 1159F MED LIST DOCD IN RCRD: CPT | Mod: CPTII,S$GLB,,

## 2022-09-09 PROCEDURE — 3078F DIAST BP <80 MM HG: CPT | Mod: CPTII,S$GLB,,

## 2022-09-09 PROCEDURE — 3077F SYST BP >= 140 MM HG: CPT | Mod: CPTII,S$GLB,,

## 2022-09-09 PROCEDURE — 3077F PR MOST RECENT SYSTOLIC BLOOD PRESSURE >= 140 MM HG: ICD-10-PCS | Mod: CPTII,S$GLB,,

## 2022-09-09 PROCEDURE — 3288F FALL RISK ASSESSMENT DOCD: CPT | Mod: CPTII,S$GLB,,

## 2022-09-09 PROCEDURE — 1101F PR PT FALLS ASSESS DOC 0-1 FALLS W/OUT INJ PAST YR: ICD-10-PCS | Mod: CPTII,S$GLB,,

## 2022-09-09 PROCEDURE — 99213 PR OFFICE/OUTPT VISIT, EST, LEVL III, 20-29 MIN: ICD-10-PCS | Mod: S$GLB,,,

## 2022-09-09 PROCEDURE — 3008F PR BODY MASS INDEX (BMI) DOCUMENTED: ICD-10-PCS | Mod: CPTII,S$GLB,,

## 2022-09-09 PROCEDURE — 99999 PR PBB SHADOW E&M-EST. PATIENT-LVL III: ICD-10-PCS | Mod: PBBFAC,,,

## 2022-09-09 PROCEDURE — 3288F PR FALLS RISK ASSESSMENT DOCUMENTED: ICD-10-PCS | Mod: CPTII,S$GLB,,

## 2022-09-09 PROCEDURE — 1125F AMNT PAIN NOTED PAIN PRSNT: CPT | Mod: CPTII,S$GLB,,

## 2022-09-09 PROCEDURE — 99213 OFFICE O/P EST LOW 20 MIN: CPT | Mod: S$GLB,,,

## 2022-09-09 PROCEDURE — 3078F PR MOST RECENT DIASTOLIC BLOOD PRESSURE < 80 MM HG: ICD-10-PCS | Mod: CPTII,S$GLB,,

## 2022-09-09 RX ORDER — TRAMADOL HYDROCHLORIDE 50 MG/1
50 TABLET ORAL EVERY 8 HOURS PRN
Qty: 21 TABLET | Refills: 0 | Status: SHIPPED | OUTPATIENT
Start: 2022-09-09 | End: 2022-09-16

## 2022-09-09 RX ORDER — DULOXETIN HYDROCHLORIDE 30 MG/1
60 CAPSULE, DELAYED RELEASE ORAL DAILY
Qty: 30 CAPSULE | Refills: 3 | Status: SHIPPED | OUTPATIENT
Start: 2022-09-09 | End: 2022-10-06

## 2022-09-09 NOTE — PROGRESS NOTES
"  [facility] Follow Up Evaluation    Referred by: Dr. Skinner  Reason for referral: neck pain    CC:   Chief Complaint   Patient presents with    Follow-up        Last 3 PDI Scores 6/24/2022 10/8/2021   Pain Disability Index (PDI) 37 31     Interval HPI 9/9/2022: Aye Montanez returns to the clinic for follow up. Since last visit she stared a trial of Cymbalta 30mg twice daily. She reports excellent relief sicne starting the trial. She states the previous "zings" she would get down her arms have improved and overall she is doing much better. She is very pleased with the relief from the Cymbalta. Since stopping the Lyrica previously her issues with diarrhea have resolved. She still reports some remaining pain in her neck however it is tolerable. She denies any new numbness, weakness or changes to her bowl or bladder function.       Pain Intervention History:  - s/p cervical SONYA on 6/10/2022 with no relief of her pain.       HPI:   yAe Montanez is a 67 y.o. female who complains of neck pain    Onset: about 10 months  Progression: since onset, pain is gradually worsening  Typical Range: 6-10/10  Timing: constant  Quality: aching, numb, burning, tingling  Radiation: yes, down both arms  Associated numbness or weakness: yes numbness, yes weakness    Exacerbated by: lifting  Allievated by: rest, laying  Is Pain Level Acceptable?: No    Previous Therapies:  PT/OT:   HEP:   Interventions:   Surgery:  Medications:   - NSAIDS:   - MSK Relaxants:   - TCAs:   - SNRIs: cymbalta   - Topicals:   - Anticonvulsants:  Lyrica-did not tolerate gave her diarrhea.  - Opioids:     History:    Current Outpatient Medications:     acetaminophen (TYLENOL) 500 MG tablet, Take 1,000 mg by mouth every 6 (six) hours as needed for Pain., Disp: , Rfl:     belimumab (BENLYSTA) 200 mg/mL Syrg, Inject 1 mL (200 mg total) into the skin once a week., Disp: 4 mL, Rfl: 11    clopidogreL (PLAVIX) 75 mg tablet, Take 1 tablet (75 mg total) by " "mouth once daily. Take with aspirin 81 mg for 21 days. Then, stop aspirin and take just clopidogrel thereafter., Disp: 90 tablet, Rfl: 3    DULoxetine (CYMBALTA) 30 MG capsule, Take 1 capsule (30 mg total) by mouth 2 (two) times daily., Disp: 60 capsule, Rfl: 02    fluorometholone 0.1% (FML) 0.1 % DrpS, , Disp: , Rfl:     furosemide (LASIX) 20 MG tablet, Take 1 tablet (20 mg total) by mouth once daily., Disp: 90 tablet, Rfl: 3    hydrocortisone 2.5 % cream, Apply topically 2 (two) times daily., Disp: 1 each, Rfl: 1    levothyroxine (SYNTHROID) 25 MCG tablet, Take 1 tablet (25 mcg total) by mouth before breakfast., Disp: 30 tablet, Rfl: 11    magnesium oxide (MAG-OX) 400 mg (241.3 mg magnesium) tablet, Take 400 mg by mouth once daily., Disp: , Rfl:     omeprazole (PRILOSEC) 20 MG capsule, Take 1 capsule (20 mg total) by mouth once daily., Disp: 90 capsule, Rfl: 3    potassium chloride SA (K-DUR,KLOR-CON) 20 MEQ tablet, Take 1 tablet (20 mEq total) by mouth once daily., Disp: 90 tablet, Rfl: 3    rosuvastatin (CRESTOR) 40 MG Tab, Take 1 tablet (40 mg total) by mouth once daily., Disp: 90 tablet, Rfl: 3    syringe with needle 3 mL 22 gauge x 3/4" Syrg, 6 Syringes by Misc.(Non-Drug; Combo Route) route every 30 days. Use to inject cyanocobalamin once every 30 days., Disp: , Rfl:     zinc gluconate 50 mg tablet, Take 50 mg by mouth once daily., Disp: , Rfl:     Past Medical History:   Diagnosis Date    Sjogren's syndrome 12/05/2016    Systemic lupus erythematosus, organ or system involvement unspecified        Past Surgical History:   Procedure Laterality Date    APPENDECTOMY      CHOLECYSTECTOMY      COLONOSCOPY      COLONOSCOPY N/A 6/9/2022    Procedure: COLONOSCOPY;  Surgeon: Eugene Del Castillo MD;  Location: [facility];  Service: Endoscopy;  Laterality: N/A;    CORONARY STENT PLACEMENT      10/2017    EPIDURAL STEROID INJECTION INTO CERVICAL SPINE N/A 6/10/2022    Procedure: Injection-steroid-epidural-cervical;  " Surgeon: Dickson Oshea MD;  Location: [facility] OR;  Service: Pain Management;  Laterality: N/A;    ESOPHAGOGASTRODUODENOSCOPY N/A 6/9/2022    Procedure: EGD (ESOPHAGOGASTRODUODENOSCOPY);  Surgeon: Eugene Del Castillo MD;  Location: [facility] ENDO;  Service: Endoscopy;  Laterality: N/A;    FRACTURE SURGERY Right     foot    HYSTERECTOMY      1984    KNEE ARTHROSCOPY Right 2/2016    OOPHORECTOMY Bilateral     hyst. 1984    TOTAL REDUCTION MAMMOPLASTY Bilateral     1999    UPPER GASTROINTESTINAL ENDOSCOPY         Family History   Problem Relation Age of Onset    Cancer Mother     Ovarian cancer Mother 40    Cancer Father         esophageal    Esophageal cancer Father     Stroke Maternal Aunt     Rheum arthritis Maternal Aunt     Rheum arthritis Paternal Uncle     Breast cancer Maternal Cousin     Breast cancer Maternal Cousin     Glaucoma Neg Hx     Macular degeneration Neg Hx        Social History     Socioeconomic History    Marital status:    Tobacco Use    Smoking status: Never    Smokeless tobacco: Never   Substance and Sexual Activity    Alcohol use: Yes     Comment: rarely    Drug use: No    Sexual activity: Yes     Partners: Male     Social Determinants of Health     Financial Resource Strain: Low Risk     Difficulty of Paying Living Expenses: Not hard at all   Food Insecurity: No Food Insecurity    Worried About Running Out of Food in the Last Year: Never true    Ran Out of Food in the Last Year: Never true   Transportation Needs: No Transportation Needs    Lack of Transportation (Medical): No    Lack of Transportation (Non-Medical): No   Physical Activity: Inactive    Days of Exercise per Week: 0 days    Minutes of Exercise per Session: 0 min   Stress: No Stress Concern Present    Feeling of Stress : Only a little   Social Connections: Moderately Integrated    Frequency of Communication with Friends and Family: Three times a week    Frequency of Social Gatherings with Friends and Family: Three times a week     "Attends Muslim Services: More than 4 times per year    Active Member of Clubs or Organizations: No    Attends Club or Organization Meetings: Never    Marital Status:    Housing Stability: Unknown    Unable to Pay for Housing in the Last Year: No    Unstable Housing in the Last Year: No       Review of patient's allergies indicates:   Allergen Reactions    Ceftin [cefuroxime axetil] Itching    Gabapentin      Felt like "no blood flow to my legs"    Metoprolol      Leg pain    [id number] antimigraine agents      Was told never to take this medication due to vascular issues - Per Neurologist     Versed [midazolam]      "wants to come out of my skin - I get hyped"       Review of Systems:  General ROS: negative for - fever  Psychological ROS: negative for - hostility  Hematological and Lymphatic ROS: negative for - bleeding problems  Endocrine ROS: negative for - unexpected weight changes  Respiratory ROS: no cough, shortness of breath, or wheezing  Cardiovascular ROS: no chest pain or dyspnea on exertion  Gastrointestinal ROS: no abdominal pain, change in bowel habits, or black or bloody stools  Musculoskeletal ROS: positive for - muscular weakness  Neurological ROS: positive for - numbness/tingling  Dermatological ROS: negative for rash    Physical Exam:  Vitals:    09/09/22 1344   BP: (!) 152/65   Temp: (!) 72 °F (22.2 °C)   SpO2: 100%   Weight: 98.4 kg (216 lb 14.9 oz)   Height: 5' 7" (1.702 m)   PainSc:   2   PainLoc: Neck       Body mass index is 33.98 kg/m².     Gen: NAD  Psych: mood appropriate for given condition, emotional, frustrated with pain  CV: 2+ radial pulse  HEENT: anicteric   Respiratory: non labored  Abd: soft nt, nd  Skin: intact  Sensation: intact to lt touch bilaterally in c4-t1   Reflexes: brisk 2+ b/l Bicep, tricep, BR and patella Luna positive bilaterally  ROM: Cervical ROM full, shoulder, elbow and wrist ROM full  Tone:  Normal at elbow, wrist and shoulder   Inspection: no " atrophy of bicep, FDI or APB noted  Palpation: tender cervical paraspinals, levator scapula and trapezius    Motor:    Right Left   C4 Shoulder Abduction  5  5   C5 Elbow Flexion    5  5   C6 Wrist Extension  5  5   C7 Elbow Extension   5  5   C8/T1 Hand Intrinsics   5  5                   Imaging:  MRI cervical spine 7/15/21  FINDINGS:  Straightening of normal cervical lordosis.  Dextrocurvature.  No spondylolisthesis.  No acute fracture with preserved vertebral body heights.  No marrow replacement.  Multilevel disc desiccation.  Mild disc height loss at C4-5 through C6-7.  Cervical cord normal in signal.  Slight ventral cord flattening focally at C3-4 and C5-6.  There is a partially imaged T2 hyperintense lesion along the right C6-7 neural foramen which measures at least 5 mm.    At C2-3 there is bilateral facet degenerative changes especially on the left.  Partial effacement ventral CSF space.  Moderate left neural foraminal narrowing.  At C3-4 minimal posterior disc bulge.  Bilateral uncovertebral spurs.  Bilateral facet degenerative changes.  Complete effacement ventral and partial effacement dorsal CSF space.  Moderate right neural foraminal narrowing.  At C4-5 posterior disc osteophyte complex and bilateral uncovertebral spurs and left facet degenerative change.  Partial effacement ventral and dorsal CSF space.  Mild right and severe left neural foraminal narrowing.  At C5-6 posterior disc osteophyte complex asymmetric to the right and bilateral uncovertebral spurs.  Complete effacement ventral CSF space and partial effacement dorsal CSF space.  At C6-7 posterior disc osteophyte complex asymmetric to the right and bilateral uncovertebral spurs.  Near complete effacement ventral CSF space.  Mild right neural foraminal narrowing.  At T3-4 and T4-5 there is right facet degenerative change, seen only on sagittal.    EMG/NCS 7/28/21  Impression: This is a slightly abnormal EMG of the left upper extremity.  There  is electrophysiologic evidence most consistent with a very mild, left, C6 radiculopathy without active denervation.  There is no evidence of any other radiculopathy, focal neuropathy, peripheral neuropathy, or plexopathy on this study.  The patient's symptoms of intermittent sensory changes could be secondary to intermittent compression not resulting in demyelination or axonal nerve injury    MRI cervical spine 07/23/2022  FINDINGS:  Lateral curvature of the cervical spine convex to the right.  No fracture or destructive lesion.  Spinal cord demonstrates normal signal throughout all allowing for artifact.     C2-C3 demonstrates 1 or 2 mm anterolisthesis, moderate left-sided facet arthrosis with osteophytic hypertrophy, mild left-sided osseous foraminal narrowing.  C3-C4 demonstrates mild disc bulge, moderate right-sided facet arthrosis, mild moderate right-sided foraminal narrowing and possible nerve root contact.  C4-C5 demonstrates moderate disc space narrowing, osteophytic lipping and mild uncovertebral spurring along the posterior disc margin, desiccated disc bulge superimposed, contact of the anterior spinal cord, mild left-sided facet arthrosis, moderate left-sided foraminal narrowing with suspected nerve root contact possible impingement.  C5-C6 demonstrates moderate disc space narrowing, severe broad-based disc bulge with anterior spinal cord contact, suspected central protrusion type herniation extending 2 mm beyond the disc plane, no significant foraminal narrowing.  C6-C7 demonstrates moderate disc space narrowing, moderate broad-based desiccated disc bulge, moderate foraminal narrowing on the right with nerve root contact.  C7-T1 demonstrates no significant disc bulge, herniation, spinal canal narrowing, foraminal narrowing.     Impression:     Multilevel disc space narrowing and disc bulging most significant at C4-C5 through C6-C7.  Suspected superimposed desiccated central protrusion type herniation  C5-C6 with anterior spinal cord contact.     Multilevel foraminal narrowing, likely most significant at C4-C5 on the left.     Minimal interval change from the prior exam.    MRI thoracic spine 07/23/2022  FINDINGS:  No fracture or destructive lesion evident.  Vertebral body height is maintained.  There is lateral curvature of the upper thoracic region convex to the left, lower midthoracic region convex to the right.  Spinal cord demonstrates normal signal throughout.     Mild facet arthrosis noted at the upper thoracic levels.  Mild facet arthrosis noted along concave margin of the curvature at the mid to upper thoracic levels.  No significant foraminal narrowing evident.  Disc spaces demonstrate mild narrowing at the lower thoracic segments.  No significant disc bulge, herniation at any level.  No significant spinal canal narrowing at any level.     Paraspinal soft tissues and visualized intrathoracic structures are unremarkable.  There is a left renal cyst incidentally noted.     MRI lumbar spine 07/23/2022  FINDINGS:  No fracture or destructive lesion evident.  Distal spinal cord and conus are grossly normal.     L1-L2 demonstrates near normal disc space signal and height without significant disc bulge, herniation, spinal canal narrowing, foraminal narrowing.  L2-L3 demonstrates mild disc space narrowing and desiccation, mild disc bulging asymmetric to the left, no significant foraminal narrowing.  L3-L4 demonstrates mild disc space narrowing asymmetric to the left, suspected broad-based left posterolateral protrusion type herniation extending 3-4 mm beyond the disc plane, left-sided lateral recess narrowing with contact of the descending left L4 nerve root, mild left-sided foraminal narrowing with extraforaminal nerve root contact.  Mild foraminal narrowing on the right.  L4-5 demonstrates mild disc space narrowing, 2 mm anterolisthesis, moderate disc bulge, moderate right-sided facet arthrosis with osteophytic  "hypertrophy without edema, minimal foraminal narrowing.  L5-S1 demonstrates near normal disc space signal, 1 or 2 mm anterolisthesis, mild lateral disc bulge, severe left-sided facet arthrosis with osteophytic hypertrophy without significant edema, mild left-sided lateral recess narrowing, moderate left-sided foraminal narrowing with nerve root distortion.      Labs:  BMP  Lab Results   Component Value Date     09/01/2022    K 4.6 09/01/2022     09/01/2022    CO2 25 09/01/2022    BUN 22 (H) 09/01/2022    CREATININE 0.89 09/01/2022    CREATININE 0.89 09/01/2022    CALCIUM 9.2 09/01/2022    ANIONGAP 12 09/01/2022    ESTGFRAFRICA >60 04/27/2022    EGFRNONAA >60 04/27/2022     Lab Results   Component Value Date    ALT 18 09/01/2022    AST 29 09/01/2022    ALKPHOS 87 09/01/2022    BILITOT 0.3 09/01/2022       Assessment:   Problem List Items Addressed This Visit          Neuro    Cervical radiculopathy    Relevant Medications    DULoxetine (CYMBALTA) 30 MG capsule    Other Relevant Orders    Ambulatory referral/consult to Physical/Occupational Therapy       Orthopedic    Neck pain - Primary     Other Visit Diagnoses       Cervical spondylosis                  67 y.o. year old female with PMH CAD, h/o CVA on plavix, HTN, presents to the office with neck pain.  She's had this pain for about the past year.  Today her pain is 6/10, constant, aching, burning, numb, radiating down both of her arms.  She reports numbness and weakness.  Denies any bowel/bladder dysfunction    9/9/2022: Aye Montanez returns to the clinic for follow up. Since last visit she stared a trial of Cymbalta 30mg twice daily. She reports excellent relief sicne starting the trial. She states the previous "zings" she would get down her arms have improved and overall she is doing much better. She is very pleased with the relief from the Cymbalta. Since stopping the Lyrica previously her issues with diarrhea have resolved. She still reports " some remaining pain in her neck however it is tolerable. She denies any new numbness, weakness or changes to her bowl or bladder function.       - on exam she has full strength in her upper extremities.   - overall she is doing very well since starting the trial of cymbalta. She reports only taking the medication once daily. At this time I will adjust the cymbalta to 60mg once daily.   - she is very pleased overall, and feels like she has such great improvement with her quality of life since starting Cymbalta.   - refill for Cymbalta 60mg provided today.   - her diarrhea has resolved since stopping the lyrica.   - at this time with her pain currently well controlled, I think continued formal physical therapy would be beneficial for her remain pain.  - The pain is limiting mobility and interfering with ADLs  - she is going on a cruise in the near future and is concerned about having severe pain, she requested a short course of tramadol to have incase she experiences severe pain.   - short course of tramadol sent to Dr. Oshea for approval.   - she is on chronic anticoagulation due to multiple CVA/TIA's  - follow up as needed.       : Reviewed     The above note was completed, in part, with the aid of Dragon dictation software/hardware. Translation errors may be present.

## 2022-10-05 ENCOUNTER — PATIENT MESSAGE (OUTPATIENT)
Dept: PAIN MEDICINE | Facility: CLINIC | Age: 67
End: 2022-10-05
Payer: MEDICARE

## 2022-10-05 DIAGNOSIS — M54.12 CERVICAL RADICULOPATHY: ICD-10-CM

## 2022-10-06 RX ORDER — DULOXETIN HYDROCHLORIDE 30 MG/1
60 CAPSULE, DELAYED RELEASE ORAL DAILY
Qty: 90 CAPSULE | Refills: 1 | Status: SHIPPED | OUTPATIENT
Start: 2022-10-06 | End: 2022-11-01

## 2022-10-06 NOTE — TELEPHONE ENCOUNTER
Refill sent.    I discontinue the previous 30 day supply with refills and placed a 90 day supply with 1 refill.

## 2022-11-07 ENCOUNTER — PATIENT MESSAGE (OUTPATIENT)
Dept: FAMILY MEDICINE | Facility: CLINIC | Age: 67
End: 2022-11-07
Payer: MEDICARE

## 2022-11-07 DIAGNOSIS — I25.10 CORONARY ARTERY DISEASE INVOLVING NATIVE CORONARY ARTERY OF NATIVE HEART WITHOUT ANGINA PECTORIS: Primary | ICD-10-CM

## 2022-11-09 NOTE — TELEPHONE ENCOUNTER
Spoke with patient and she will send me a message once she researches and decides what cardiologist she would like to see.

## 2022-11-10 ENCOUNTER — TELEPHONE (OUTPATIENT)
Dept: FAMILY MEDICINE | Facility: CLINIC | Age: 67
End: 2022-11-10
Payer: MEDICARE

## 2022-11-10 ENCOUNTER — PATIENT MESSAGE (OUTPATIENT)
Dept: FAMILY MEDICINE | Facility: CLINIC | Age: 67
End: 2022-11-10
Payer: MEDICARE

## 2022-11-15 ENCOUNTER — SPECIALTY PHARMACY (OUTPATIENT)
Dept: PHARMACY | Facility: CLINIC | Age: 67
End: 2022-11-15

## 2022-11-15 NOTE — TELEPHONE ENCOUNTER
Outgoing call to pt regarding [employer] PAP, pt would like assistance in submitting application, pt will mail pt portion to [facility], on 11/15/22.

## 2022-11-30 NOTE — TELEPHONE ENCOUNTER
Patient portion of Benlysta PAP received. Faxed complete application to [employer] at [phone]. Will continue to follow up.

## 2022-12-01 NOTE — TELEPHONE ENCOUNTER
I spoke with a [employer] representative today. They received the application via fax but have not had a chance to review it yet.

## 2022-12-14 ENCOUNTER — PATIENT MESSAGE (OUTPATIENT)
Dept: FAMILY MEDICINE | Facility: CLINIC | Age: 67
End: 2022-12-14
Payer: MEDICARE

## 2022-12-14 ENCOUNTER — OFFICE VISIT (OUTPATIENT)
Dept: URGENT CARE | Facility: CLINIC | Age: 67
End: 2022-12-14
Payer: MEDICARE

## 2022-12-14 ENCOUNTER — PATIENT MESSAGE (OUTPATIENT)
Dept: RHEUMATOLOGY | Facility: CLINIC | Age: 67
End: 2022-12-14
Payer: MEDICARE

## 2022-12-14 VITALS
SYSTOLIC BLOOD PRESSURE: 136 MMHG | HEART RATE: 97 BPM | RESPIRATION RATE: 16 BRPM | TEMPERATURE: 101 F | DIASTOLIC BLOOD PRESSURE: 88 MMHG | OXYGEN SATURATION: 99 %

## 2022-12-14 DIAGNOSIS — R50.9 FEVER, UNSPECIFIED FEVER CAUSE: Primary | ICD-10-CM

## 2022-12-14 DIAGNOSIS — U07.1 COVID-19: ICD-10-CM

## 2022-12-14 LAB
CTP QC/QA: YES
CTP QC/QA: YES
POC MOLECULAR INFLUENZA A AGN: NEGATIVE
POC MOLECULAR INFLUENZA B AGN: NEGATIVE
SARS-COV-2 AG RESP QL IA.RAPID: POSITIVE

## 2022-12-14 PROCEDURE — 87502 INFLUENZA DNA AMP PROBE: CPT | Mod: QW,S$GLB,, | Performed by: FAMILY MEDICINE

## 2022-12-14 PROCEDURE — 1160F PR REVIEW ALL MEDS BY PRESCRIBER/CLIN PHARMACIST DOCUMENTED: ICD-10-PCS | Mod: CPTII,S$GLB,, | Performed by: FAMILY MEDICINE

## 2022-12-14 PROCEDURE — 99214 PR OFFICE/OUTPT VISIT, EST, LEVL IV, 30-39 MIN: ICD-10-PCS | Mod: S$GLB,,, | Performed by: FAMILY MEDICINE

## 2022-12-14 PROCEDURE — 1160F RVW MEDS BY RX/DR IN RCRD: CPT | Mod: CPTII,S$GLB,, | Performed by: FAMILY MEDICINE

## 2022-12-14 PROCEDURE — U0002 SARS CORONAVIRUS 2 ANTIGEN POCT, MANUAL READ: ICD-10-PCS | Mod: QW,S$GLB,, | Performed by: FAMILY MEDICINE

## 2022-12-14 PROCEDURE — 99214 OFFICE O/P EST MOD 30 MIN: CPT | Mod: S$GLB,,, | Performed by: FAMILY MEDICINE

## 2022-12-14 PROCEDURE — 1125F AMNT PAIN NOTED PAIN PRSNT: CPT | Mod: CPTII,S$GLB,, | Performed by: FAMILY MEDICINE

## 2022-12-14 PROCEDURE — 3075F PR MOST RECENT SYSTOLIC BLOOD PRESS GE 130-139MM HG: ICD-10-PCS | Mod: CPTII,S$GLB,, | Performed by: FAMILY MEDICINE

## 2022-12-14 PROCEDURE — 1159F PR MEDICATION LIST DOCUMENTED IN MEDICAL RECORD: ICD-10-PCS | Mod: CPTII,S$GLB,, | Performed by: FAMILY MEDICINE

## 2022-12-14 PROCEDURE — 1125F PR PAIN SEVERITY QUANTIFIED, PAIN PRESENT: ICD-10-PCS | Mod: CPTII,S$GLB,, | Performed by: FAMILY MEDICINE

## 2022-12-14 PROCEDURE — U0002 COVID-19 LAB TEST NON-CDC: HCPCS | Mod: QW,S$GLB,, | Performed by: FAMILY MEDICINE

## 2022-12-14 PROCEDURE — 3075F SYST BP GE 130 - 139MM HG: CPT | Mod: CPTII,S$GLB,, | Performed by: FAMILY MEDICINE

## 2022-12-14 PROCEDURE — 3079F DIAST BP 80-89 MM HG: CPT | Mod: CPTII,S$GLB,, | Performed by: FAMILY MEDICINE

## 2022-12-14 PROCEDURE — 1159F MED LIST DOCD IN RCRD: CPT | Mod: CPTII,S$GLB,, | Performed by: FAMILY MEDICINE

## 2022-12-14 PROCEDURE — 87502 POCT INFLUENZA A/B MOLECULAR: ICD-10-PCS | Mod: QW,S$GLB,, | Performed by: FAMILY MEDICINE

## 2022-12-14 PROCEDURE — 3079F PR MOST RECENT DIASTOLIC BLOOD PRESSURE 80-89 MM HG: ICD-10-PCS | Mod: CPTII,S$GLB,, | Performed by: FAMILY MEDICINE

## 2022-12-14 RX ORDER — BENZONATATE 100 MG/1
100 CAPSULE ORAL EVERY 6 HOURS PRN
Qty: 30 CAPSULE | Refills: 1 | Status: SHIPPED | OUTPATIENT
Start: 2022-12-14 | End: 2023-04-04

## 2022-12-14 NOTE — PROGRESS NOTES
Subjective:       Patient ID: Aye Montanez is a 67 y.o. female.    Vitals:  temperature is 101.4 °F (38.6 °C) (abnormal). Her blood pressure is 136/88 and her pulse is 97. Her respiration is 16 and oxygen saturation is 99%.     Chief Complaint: Fever    Pt presents with fever, chills, hoarse, swollen glands, headache, body aches, cough, nichole x yest      Fever   This is a new problem. The current episode started yesterday. The problem occurs constantly. The problem has been gradually worsening. The temperature was taken using an oral thermometer. Associated symptoms include congestion, coughing, headaches and muscle aches. She has tried acetaminophen (dayquil) for the symptoms. The treatment provided mild relief.     Constitution: Positive for fever.   HENT:  Positive for congestion.    Respiratory:  Positive for cough.    Neurological:  Positive for headaches.     Objective:      Physical Exam      Physical Exam  Vitals signs and nursing note reviewed.   Constitutional:       Appearance: Pt is well-developed. Alert, NAD.  Pt is cooperative.  Non-toxic appearance.  HENT:      Head: Normocephalic and atraumatic. .      Right Ear: External ear normal.      Left Ear: External ear normal.   Eyes:      General: Lids are normal.      Conjunctiva/sclera: Conjunctivae normal. Visual tracking is normal. Right eye exhibits no exudate. Left eye exhibits no exudate. No scleral icterus.     Pupils: Pupils are equal, round  Neck:      Musculoskeletal: range of motion without pain and neck supple.      Trachea: Trachea and phonation normal.   Throat: No apparent pharyngeal edema or swelling  Cardiovascular:      Rate and Rhythm: Normal Rhythm. Extremities well perfused.   Pulmonary:      Effort: Pulmonary effort is normal. No respiratory distress. NO stridor or difficulty breathing     Breath sounds: wheezing.   Abdomen: NO obvious distention.  Musculoskeletal: Normal range of motion. No ambulation issues  Skin:     General:  Skin is warm and dry. No open wounds or abrasions. No petechiae No cyanosis  no jaundice not diaphoretic, not pale, not purpuric  Neurological:      Mental Status:Pt is alert and oriented to person, place, and time.   Psychiatric:         Speech: Speech normal.         Behavior: Behavior normal.         Thought Content: Thought content normal.         Judgment: Judgment normal.       Assessment:       1. Fever, unspecified fever cause    2. COVID-19          Plan:     Use home nebulizer.  ER if worsens.    Fever, unspecified fever cause  -     POCT Influenza A/B MOLECULAR  -     SARS Coronavirus 2 Antigen, POCT Manual Read    COVID-19    Other orders  -     molnupiravir 200 mg capsule (EUA); Take 4 capsules (800 mg total) by mouth every 12 (twelve) hours. for 5 days  Dispense: 40 capsule; Refill: 0  -     benzonatate (TESSALON PERLES) 100 MG capsule; Take 1 capsule (100 mg total) by mouth every 6 (six) hours as needed for Cough.  Dispense: 30 capsule; Refill: 1

## 2022-12-20 NOTE — TELEPHONE ENCOUNTER
Outgoing call to [employer] [place] for status check on Benlysta PAP renewal. Representative stated renewal applications will be reviewed after January 1st, 2023. Will continue to follow up.

## 2022-12-23 ENCOUNTER — TELEPHONE (OUTPATIENT)
Dept: URGENT CARE | Facility: CLINIC | Age: 67
End: 2022-12-23
Payer: MEDICARE

## 2022-12-30 ENCOUNTER — PES CALL (OUTPATIENT)
Dept: ADMINISTRATIVE | Facility: CLINIC | Age: 67
End: 2022-12-30
Payer: MEDICARE

## 2023-01-06 DIAGNOSIS — M25.562 ACUTE PAIN OF LEFT KNEE: Primary | ICD-10-CM

## 2023-01-06 DIAGNOSIS — M17.12 PRIMARY OSTEOARTHRITIS OF LEFT KNEE: ICD-10-CM

## 2023-01-09 ENCOUNTER — OFFICE VISIT (OUTPATIENT)
Dept: ORTHOPEDICS | Facility: CLINIC | Age: 68
End: 2023-01-09
Payer: MEDICARE

## 2023-01-09 ENCOUNTER — HOSPITAL ENCOUNTER (OUTPATIENT)
Dept: RADIOLOGY | Facility: HOSPITAL | Age: 68
Discharge: HOME | End: 2023-01-09
Attending: ORTHOPAEDIC SURGERY
Payer: MEDICARE

## 2023-01-09 DIAGNOSIS — M17.12 OSTEOARTHRITIS OF LEFT KNEE, UNSPECIFIED OSTEOARTHRITIS TYPE: Primary | ICD-10-CM

## 2023-01-09 DIAGNOSIS — M17.12 PRIMARY OSTEOARTHRITIS OF LEFT KNEE: ICD-10-CM

## 2023-01-09 DIAGNOSIS — M25.562 BILATERAL KNEE PAIN: ICD-10-CM

## 2023-01-09 DIAGNOSIS — M25.561 BILATERAL KNEE PAIN: ICD-10-CM

## 2023-01-09 PROCEDURE — 20610 DRAIN/INJ JOINT/BURSA W/O US: CPT | Mod: LT,S$GLB,, | Performed by: ORTHOPAEDIC SURGERY

## 2023-01-09 PROCEDURE — 73562 X-RAY EXAM OF KNEE 3: CPT | Mod: 26,50,, | Performed by: RADIOLOGY

## 2023-01-09 PROCEDURE — 99204 PR OFFICE/OUTPT VISIT, NEW, LEVL IV, 45-59 MIN: ICD-10-PCS | Mod: 25,S$GLB,, | Performed by: ORTHOPAEDIC SURGERY

## 2023-01-09 PROCEDURE — 73562 X-RAY EXAM OF KNEE 3: CPT | Mod: TC,50,PO

## 2023-01-09 PROCEDURE — 1159F PR MEDICATION LIST DOCUMENTED IN MEDICAL RECORD: ICD-10-PCS | Mod: CPTII,S$GLB,, | Performed by: ORTHOPAEDIC SURGERY

## 2023-01-09 PROCEDURE — 73562 XR KNEE ORTHO BILAT: ICD-10-PCS | Mod: 26,50,, | Performed by: RADIOLOGY

## 2023-01-09 PROCEDURE — 1160F RVW MEDS BY RX/DR IN RCRD: CPT | Mod: CPTII,S$GLB,, | Performed by: ORTHOPAEDIC SURGERY

## 2023-01-09 PROCEDURE — 99204 OFFICE O/P NEW MOD 45 MIN: CPT | Mod: 25,S$GLB,, | Performed by: ORTHOPAEDIC SURGERY

## 2023-01-09 PROCEDURE — 1160F PR REVIEW ALL MEDS BY PRESCRIBER/CLIN PHARMACIST DOCUMENTED: ICD-10-PCS | Mod: CPTII,S$GLB,, | Performed by: ORTHOPAEDIC SURGERY

## 2023-01-09 PROCEDURE — 1159F MED LIST DOCD IN RCRD: CPT | Mod: CPTII,S$GLB,, | Performed by: ORTHOPAEDIC SURGERY

## 2023-01-09 PROCEDURE — 99999 PR PBB SHADOW E&M-EST. PATIENT-LVL I: ICD-10-PCS | Mod: PBBFAC,,, | Performed by: ORTHOPAEDIC SURGERY

## 2023-01-09 PROCEDURE — 20610 LARGE JOINT ASPIRATION/INJECTION: L KNEE: ICD-10-PCS | Mod: LT,S$GLB,, | Performed by: ORTHOPAEDIC SURGERY

## 2023-01-09 PROCEDURE — 99999 PR PBB SHADOW E&M-EST. PATIENT-LVL I: CPT | Mod: PBBFAC,,, | Performed by: ORTHOPAEDIC SURGERY

## 2023-01-09 RX ORDER — TRIAMCINOLONE ACETONIDE 40 MG/ML
40 INJECTION, SUSPENSION INTRA-ARTICULAR; INTRAMUSCULAR
Status: DISCONTINUED | OUTPATIENT
Start: 2023-01-09 | End: 2023-01-09 | Stop reason: HOSPADM

## 2023-01-09 RX ADMIN — TRIAMCINOLONE ACETONIDE 40 MG: 40 INJECTION, SUSPENSION INTRA-ARTICULAR; INTRAMUSCULAR at 09:01

## 2023-01-09 NOTE — PROCEDURES
Large Joint Aspiration/Injection: L knee    Date/Time: 1/9/2023 9:15 AM  Performed by: Gigi Armenta MD  Authorized by: Gigi Armenta MD     Consent Done?:  Yes (Verbal)  Timeout: prior to procedure the correct patient, procedure, and site was verified    Prep: patient was prepped and draped in usual sterile fashion      Details:  Needle Size:  21 G  Approach:  Anterolateral  Location:  Knee  Site:  L knee  Medications:  40 mg triamcinolone acetonide 40 mg/mL  Patient tolerance:  Patient tolerated the procedure well with no immediate complications

## 2023-01-09 NOTE — PROGRESS NOTES
67 years old left knee pain for several years time 7 on good days 8 on bad days.  She is attempted oral anti-inflammatories with partial relief.  Reports history of Kenalog injection as well as viscosupplementation with limited partial relief.  Having difficulty doing activities such as going up and down stairs walking    Exam shows tenderness the joint line no signs infection instability skin is intact compartments soft    X-rays show arthritic change     Assessment:  Left arthrosis     Plan:  Kenalog injection left knee, consideration of repeat viscosupplementation as well as we would hoped tried to get her feeling better for upcoming trip to [place]

## 2023-01-10 ENCOUNTER — PATIENT MESSAGE (OUTPATIENT)
Dept: PHARMACY | Facility: CLINIC | Age: 68
End: 2023-01-10
Payer: MEDICARE

## 2023-01-19 ENCOUNTER — PATIENT MESSAGE (OUTPATIENT)
Dept: RHEUMATOLOGY | Facility: CLINIC | Age: 68
End: 2023-01-19
Payer: MEDICARE

## 2023-01-22 DIAGNOSIS — E78.5 DYSLIPIDEMIA: ICD-10-CM

## 2023-01-22 DIAGNOSIS — I25.10 CORONARY ARTERY DISEASE DUE TO CALCIFIED CORONARY LESION: ICD-10-CM

## 2023-01-22 DIAGNOSIS — I50.9 CHRONIC CONGESTIVE HEART FAILURE, UNSPECIFIED HEART FAILURE TYPE: ICD-10-CM

## 2023-01-22 DIAGNOSIS — I25.84 CORONARY ARTERY DISEASE DUE TO CALCIFIED CORONARY LESION: ICD-10-CM

## 2023-01-22 DIAGNOSIS — I25.10 CORONARY ARTERY DISEASE INVOLVING NATIVE CORONARY ARTERY OF NATIVE HEART WITHOUT ANGINA PECTORIS: ICD-10-CM

## 2023-01-22 DIAGNOSIS — K21.9 GASTROESOPHAGEAL REFLUX DISEASE: ICD-10-CM

## 2023-01-22 RX ORDER — FUROSEMIDE 20 MG/1
TABLET ORAL
Qty: 90 TABLET | Refills: 1 | Status: SHIPPED | OUTPATIENT
Start: 2023-01-22 | End: 2023-07-31

## 2023-01-22 RX ORDER — OMEPRAZOLE 20 MG/1
CAPSULE, DELAYED RELEASE ORAL
Qty: 90 CAPSULE | Refills: 1 | Status: SHIPPED | OUTPATIENT
Start: 2023-01-22 | End: 2023-07-30

## 2023-01-22 RX ORDER — CLOPIDOGREL BISULFATE 75 MG/1
TABLET ORAL
Qty: 90 TABLET | Refills: 1 | Status: SHIPPED | OUTPATIENT
Start: 2023-01-22 | End: 2023-07-28

## 2023-01-22 RX ORDER — POTASSIUM CHLORIDE 20 MEQ/1
TABLET, EXTENDED RELEASE ORAL
Qty: 90 TABLET | Refills: 1 | Status: SHIPPED | OUTPATIENT
Start: 2023-01-22

## 2023-01-22 RX ORDER — ROSUVASTATIN CALCIUM 20 MG/1
TABLET, COATED ORAL
Qty: 90 TABLET | Refills: 3 | OUTPATIENT
Start: 2023-01-22

## 2023-01-22 NOTE — TELEPHONE ENCOUNTER
No new care gaps identified.  [facility] Embedded Care Gaps. Reference number: [id number]. 1/22/2023   4:05:46 AM CST

## 2023-01-22 NOTE — TELEPHONE ENCOUNTER
Refill Decision Note   Aye Montanez  is requesting a refill authorization.  Brief Assessment and Rationale for Refill:  Quick Discontinue  Approve    -Medication-Related Problems Identified: Dose adjustment  Medication Therapy Plan:  Rosuvastatin dose adjustment 4/28/22. All others approved. Patient has been on Omeprazole and Clopidogrel for > 1 year.    Medication Reconciliation Completed: No   Comments:     No Care Gaps recommended.     Note composed:3:47 PM 01/22/2023

## 2023-01-23 ENCOUNTER — PATIENT MESSAGE (OUTPATIENT)
Dept: PODIATRY | Facility: CLINIC | Age: 68
End: 2023-01-23
Payer: MEDICARE

## 2023-01-25 ENCOUNTER — OFFICE VISIT (OUTPATIENT)
Dept: PODIATRY | Facility: CLINIC | Age: 68
End: 2023-01-25
Payer: MEDICARE

## 2023-01-25 DIAGNOSIS — M20.41 HAMMER TOE OF RIGHT FOOT: Primary | ICD-10-CM

## 2023-01-25 PROCEDURE — 1159F MED LIST DOCD IN RCRD: CPT | Mod: CPTII,S$GLB,, | Performed by: STUDENT IN AN ORGANIZED HEALTH CARE EDUCATION/TRAINING PROGRAM

## 2023-01-25 PROCEDURE — 3288F PR FALLS RISK ASSESSMENT DOCUMENTED: ICD-10-PCS | Mod: CPTII,S$GLB,, | Performed by: STUDENT IN AN ORGANIZED HEALTH CARE EDUCATION/TRAINING PROGRAM

## 2023-01-25 PROCEDURE — 3288F FALL RISK ASSESSMENT DOCD: CPT | Mod: CPTII,S$GLB,, | Performed by: STUDENT IN AN ORGANIZED HEALTH CARE EDUCATION/TRAINING PROGRAM

## 2023-01-25 PROCEDURE — 1125F AMNT PAIN NOTED PAIN PRSNT: CPT | Mod: CPTII,S$GLB,, | Performed by: STUDENT IN AN ORGANIZED HEALTH CARE EDUCATION/TRAINING PROGRAM

## 2023-01-25 PROCEDURE — 1160F PR REVIEW ALL MEDS BY PRESCRIBER/CLIN PHARMACIST DOCUMENTED: ICD-10-PCS | Mod: CPTII,S$GLB,, | Performed by: STUDENT IN AN ORGANIZED HEALTH CARE EDUCATION/TRAINING PROGRAM

## 2023-01-25 PROCEDURE — 99204 PR OFFICE/OUTPT VISIT, NEW, LEVL IV, 45-59 MIN: ICD-10-PCS | Mod: 57,S$GLB,, | Performed by: STUDENT IN AN ORGANIZED HEALTH CARE EDUCATION/TRAINING PROGRAM

## 2023-01-25 PROCEDURE — 1125F PR PAIN SEVERITY QUANTIFIED, PAIN PRESENT: ICD-10-PCS | Mod: CPTII,S$GLB,, | Performed by: STUDENT IN AN ORGANIZED HEALTH CARE EDUCATION/TRAINING PROGRAM

## 2023-01-25 PROCEDURE — 99999 PR PBB SHADOW E&M-EST. PATIENT-LVL III: CPT | Mod: PBBFAC,,, | Performed by: STUDENT IN AN ORGANIZED HEALTH CARE EDUCATION/TRAINING PROGRAM

## 2023-01-25 PROCEDURE — 1101F PR PT FALLS ASSESS DOC 0-1 FALLS W/OUT INJ PAST YR: ICD-10-PCS | Mod: CPTII,S$GLB,, | Performed by: STUDENT IN AN ORGANIZED HEALTH CARE EDUCATION/TRAINING PROGRAM

## 2023-01-25 PROCEDURE — 1159F PR MEDICATION LIST DOCUMENTED IN MEDICAL RECORD: ICD-10-PCS | Mod: CPTII,S$GLB,, | Performed by: STUDENT IN AN ORGANIZED HEALTH CARE EDUCATION/TRAINING PROGRAM

## 2023-01-25 PROCEDURE — 1160F RVW MEDS BY RX/DR IN RCRD: CPT | Mod: CPTII,S$GLB,, | Performed by: STUDENT IN AN ORGANIZED HEALTH CARE EDUCATION/TRAINING PROGRAM

## 2023-01-25 PROCEDURE — 99999 PR PBB SHADOW E&M-EST. PATIENT-LVL III: ICD-10-PCS | Mod: PBBFAC,,, | Performed by: STUDENT IN AN ORGANIZED HEALTH CARE EDUCATION/TRAINING PROGRAM

## 2023-01-25 PROCEDURE — 99204 OFFICE O/P NEW MOD 45 MIN: CPT | Mod: 57,S$GLB,, | Performed by: STUDENT IN AN ORGANIZED HEALTH CARE EDUCATION/TRAINING PROGRAM

## 2023-01-25 PROCEDURE — 1101F PT FALLS ASSESS-DOCD LE1/YR: CPT | Mod: CPTII,S$GLB,, | Performed by: STUDENT IN AN ORGANIZED HEALTH CARE EDUCATION/TRAINING PROGRAM

## 2023-01-25 NOTE — TELEPHONE ENCOUNTER
Sed rate is elevated , c-reactive was completely normal nothing urgent or even worrisome we can discuss further 2/6/23. Hope her  is recovering well.   Dr. Bee

## 2023-01-25 NOTE — PROGRESS NOTES
Chief Complaint   Patient presents with    Foot Pain             MEDICAL DECISION MAKING:       Problem List Items Addressed This Visit    None  Visit Diagnoses       Hammer toe of right foot    -  Primary    Relevant Orders    Case Request Operating Room: CORRECTION, HAMMER TOE (Completed)              I counseled the patient on the patient's conditions, their implications and medical management.   Conservative treatments discussed include padding, hammertoe crest  Pads, extra-depth shoes   Surgical treatments discussed, including hammertoe correction and generic post-op course.   Patient would like to proceed with surgery today for hammertoe correction of the right 3rd toe.  She would like this done as soon as possible.  Tentatively planned for February 3, 2023.  Patient will get preop clearance from her PCP as soon as possible and stop anticoagulation around 5 days before the surgery.  Risks and complications were discussed with the patient and written consent was obtained.                    HPI:   Aye Montanez is a 67 y.o. female who presents to clinic with concerns of hammertoes. Pain has been present and increasing gradually over the years.   Patient no trauma to the area.    Open toed shoes improves the pain, and closed, tight shoes makes it worse.         Patient Active Problem List   Diagnosis    Acid reflux    Decreased white blood cell count    Avitaminosis D    Splenomegaly    Hyperglobulinemia    Raynaud's disease    Perennial allergic rhinitis with seasonal variation    Atherosclerosis of aorta, seen on CT    History of CVA (cerebrovascular accident) S/P TPA    Coronary artery disease, s/p stenting LAD x 2 10/2017    Epistaxis    Osteopenia of right thigh    Nonspecific abnormal results of thyroid function study    Chronic diastolic congestive heart failure    Sjogren's syndrome without extraglandular involvement    Class 1 obesity due to excess calories with serious comorbidity and body mass index  "(BMI) of 32.0 to 32.9 in adult    Vitamin B12 deficiency    Acquired hypothyroidism    Hypercholesterolemia    Transient alteration of awareness    Vertebrobasilar TIAs    Paresthesias    Neck pain    Other forms of systemic lupus erythematosus    Early dry stage nonexudative age-related macular degeneration of both eyes    NS (nuclear sclerosis), bilateral    Cervical radiculopathy    Falls       Current Outpatient Medications on File Prior to Visit   Medication Sig Dispense Refill    acetaminophen (TYLENOL) 500 MG tablet Take 1,000 mg by mouth every 6 (six) hours as needed for Pain.      belimumab (BENLYSTA) 200 mg/mL Syrg Inject 1 mL (200 mg total) into the skin once a week. 4 mL 11    benzonatate (TESSALON PERLES) 100 MG capsule Take 1 capsule (100 mg total) by mouth every 6 (six) hours as needed for Cough. 30 capsule 1    clopidogreL (PLAVIX) 75 mg tablet TAKE 1 TABLET BY MOUTH ONCE DAILY WITH ASPIRIN 81MG FOR 21 DAYS.** STOP ASPIRIN AND TAKE PLAVIX ALONE THEREAFTER 90 tablet 1    DULoxetine (CYMBALTA) 30 MG capsule Take 2 capsules (60 mg total) by mouth once daily. 60 capsule 03    fluorometholone 0.1% (FML) 0.1 % DrpS       furosemide (LASIX) 20 MG tablet TAKE 1 TABLET(20 MG) BY MOUTH EVERY DAY 90 tablet 1    hydrocortisone 2.5 % cream Apply topically 2 (two) times daily. 1 each 1    levothyroxine (SYNTHROID) 25 MCG tablet Take 1 tablet (25 mcg total) by mouth before breakfast. 30 tablet 11    magnesium oxide (MAG-OX) 400 mg (241.3 mg magnesium) tablet Take 400 mg by mouth once daily.      omeprazole (PRILOSEC) 20 MG capsule TAKE 1 CAPSULE(20 MG) BY MOUTH EVERY DAY 90 capsule 1    potassium chloride SA (K-DUR,KLOR-CON) 20 MEQ tablet TAKE 1 TABLET(20 MEQ) BY MOUTH EVERY DAY 90 tablet 1    rosuvastatin (CRESTOR) 40 MG Tab Take 1 tablet (40 mg total) by mouth once daily. 90 tablet 3    syringe with needle 3 mL 22 gauge x 3/4" Syrg 6 Syringes by Misc.(Non-Drug; Combo Route) route every 30 days. Use to inject " "cyanocobalamin once every 30 days.      zinc gluconate 50 mg tablet Take 50 mg by mouth once daily.       No current facility-administered medications on file prior to visit.       Review of patient's allergies indicates:   Allergen Reactions    Ceftin [cefuroxime axetil] Itching    Gabapentin      Felt like "no blood flow to my legs"    Metoprolol      Leg pain    [id number] antimigraine agents      Was told never to take this medication due to vascular issues - Per Neurologist     Versed [midazolam]      "wants to come out of my skin - I get hyped"           Review of Systems -   General ROS: negative  Respiratory ROS: no cough, shortness of breath, or wheezing  Cardiovascular ROS: no chest pain or dyspnea on exertion  Musculoskeletal ROS: positive for - joint stiffness  negative for - muscle pain or muscular weakness  Neurological ROS: no TIA or stroke symptoms  Dermatological ROS: negative            EXAM:  There were no vitals filed for this visit.      Gen: Alert and orient x 3, no apparent distress. Cooperative.       Right   Foot:      Vascular:     Dorsalis pedis pulse 2/4   posterior tibial pulse 2/4 .   2 seconds capillary refill time and toes are cool to touch.  There is  presence of digital hair.  There is no edema.  no varicosities.    Neurological: no sensory deficits noted      Derm: slight discoloration of the toes due to raynauds.      MSK:  The 3rd toe(s) have rigid flexion contraction(s) at the level of the proximal interphalangeal joint(s). No subluxation appreciated at the 3rd MTPJ.         "

## 2023-01-26 ENCOUNTER — PATIENT MESSAGE (OUTPATIENT)
Dept: NEUROLOGY | Facility: CLINIC | Age: 68
End: 2023-01-26
Payer: MEDICARE

## 2023-01-27 ENCOUNTER — PATIENT MESSAGE (OUTPATIENT)
Dept: RHEUMATOLOGY | Facility: CLINIC | Age: 68
End: 2023-01-27
Payer: MEDICARE

## 2023-01-30 ENCOUNTER — TELEPHONE (OUTPATIENT)
Dept: FAMILY MEDICINE | Facility: CLINIC | Age: 68
End: 2023-01-30

## 2023-01-30 NOTE — TELEPHONE ENCOUNTER
Paper for clearance placed in your sign box.       Hammer toe surgery.    Patient states that Dr. Walters already stopped the plavix, but she still needs this clearance signed by you.     Patient has an appointment with you day before surgery and wants to use that appointment. 2/2/23. SD 2/3/23    Please advise

## 2023-02-01 ENCOUNTER — TELEPHONE (OUTPATIENT)
Dept: SURGERY | Facility: HOSPITAL | Age: 68
End: 2023-02-01
Payer: MEDICARE

## 2023-02-01 NOTE — TELEPHONE ENCOUNTER
Good afternoon,    Patient called pre-op center and stated that she thinks she has a cold or URI. Her  had one this past week. Patient stated that yesterday she begin having lots of congestion and coughing, but no fever. Spoke to Dr. Raphael, one of our anesthesiologist at [facility] [place] and after a chart review she recommends procedure be postponed until patient is back to baseline. Patient notified and verbalized understanding and agreement. Please contact patient regarding rescheduling.     Thank you!

## 2023-02-02 ENCOUNTER — OFFICE VISIT (OUTPATIENT)
Dept: FAMILY MEDICINE | Facility: CLINIC | Age: 68
End: 2023-02-02
Payer: MEDICARE

## 2023-02-02 VITALS
TEMPERATURE: 98 F | WEIGHT: 214.75 LBS | BODY MASS INDEX: 33.71 KG/M2 | HEIGHT: 67 IN | HEART RATE: 72 BPM | DIASTOLIC BLOOD PRESSURE: 80 MMHG | SYSTOLIC BLOOD PRESSURE: 128 MMHG | OXYGEN SATURATION: 97 %

## 2023-02-02 DIAGNOSIS — I70.0 ATHEROSCLEROSIS OF AORTA: ICD-10-CM

## 2023-02-02 DIAGNOSIS — M32.13 OTHER SYSTEMIC LUPUS ERYTHEMATOSUS WITH LUNG INVOLVEMENT: ICD-10-CM

## 2023-02-02 DIAGNOSIS — F32.0 CURRENT MILD EPISODE OF MAJOR DEPRESSIVE DISORDER WITHOUT PRIOR EPISODE: ICD-10-CM

## 2023-02-02 DIAGNOSIS — J06.9 VIRAL UPPER RESPIRATORY ILLNESS: Primary | ICD-10-CM

## 2023-02-02 PROBLEM — I50.32 CHRONIC DIASTOLIC CONGESTIVE HEART FAILURE: Status: RESOLVED | Noted: 2019-09-26 | Resolved: 2023-02-02

## 2023-02-02 LAB
CTP QC/QA: YES
CTP QC/QA: YES
POC MOLECULAR INFLUENZA A AGN: NEGATIVE
POC MOLECULAR INFLUENZA B AGN: NEGATIVE
SARS-COV-2 RDRP RESP QL NAA+PROBE: NEGATIVE

## 2023-02-02 PROCEDURE — 3079F DIAST BP 80-89 MM HG: CPT | Mod: CPTII,S$GLB,, | Performed by: INTERNAL MEDICINE

## 2023-02-02 PROCEDURE — 3074F SYST BP LT 130 MM HG: CPT | Mod: CPTII,S$GLB,, | Performed by: INTERNAL MEDICINE

## 2023-02-02 PROCEDURE — 3008F BODY MASS INDEX DOCD: CPT | Mod: CPTII,S$GLB,, | Performed by: INTERNAL MEDICINE

## 2023-02-02 PROCEDURE — 3288F PR FALLS RISK ASSESSMENT DOCUMENTED: ICD-10-PCS | Mod: CPTII,S$GLB,, | Performed by: INTERNAL MEDICINE

## 2023-02-02 PROCEDURE — 1160F RVW MEDS BY RX/DR IN RCRD: CPT | Mod: CPTII,S$GLB,, | Performed by: INTERNAL MEDICINE

## 2023-02-02 PROCEDURE — 99999 PR PBB SHADOW E&M-EST. PATIENT-LVL IV: ICD-10-PCS | Mod: PBBFAC,,, | Performed by: INTERNAL MEDICINE

## 2023-02-02 PROCEDURE — 1101F PT FALLS ASSESS-DOCD LE1/YR: CPT | Mod: CPTII,S$GLB,, | Performed by: INTERNAL MEDICINE

## 2023-02-02 PROCEDURE — 99214 PR OFFICE/OUTPT VISIT, EST, LEVL IV, 30-39 MIN: ICD-10-PCS | Mod: S$GLB,,, | Performed by: INTERNAL MEDICINE

## 2023-02-02 PROCEDURE — 1126F AMNT PAIN NOTED NONE PRSNT: CPT | Mod: CPTII,S$GLB,, | Performed by: INTERNAL MEDICINE

## 2023-02-02 PROCEDURE — 3074F PR MOST RECENT SYSTOLIC BLOOD PRESSURE < 130 MM HG: ICD-10-PCS | Mod: CPTII,S$GLB,, | Performed by: INTERNAL MEDICINE

## 2023-02-02 PROCEDURE — 1159F MED LIST DOCD IN RCRD: CPT | Mod: CPTII,S$GLB,, | Performed by: INTERNAL MEDICINE

## 2023-02-02 PROCEDURE — 1126F PR PAIN SEVERITY QUANTIFIED, NO PAIN PRESENT: ICD-10-PCS | Mod: CPTII,S$GLB,, | Performed by: INTERNAL MEDICINE

## 2023-02-02 PROCEDURE — 3079F PR MOST RECENT DIASTOLIC BLOOD PRESSURE 80-89 MM HG: ICD-10-PCS | Mod: CPTII,S$GLB,, | Performed by: INTERNAL MEDICINE

## 2023-02-02 PROCEDURE — 1101F PR PT FALLS ASSESS DOC 0-1 FALLS W/OUT INJ PAST YR: ICD-10-PCS | Mod: CPTII,S$GLB,, | Performed by: INTERNAL MEDICINE

## 2023-02-02 PROCEDURE — 87502 POCT INFLUENZA A/B MOLECULAR: ICD-10-PCS | Mod: QW,S$GLB,, | Performed by: INTERNAL MEDICINE

## 2023-02-02 PROCEDURE — 87635 SARS-COV-2 COVID-19 AMP PRB: CPT | Mod: QW,S$GLB,, | Performed by: INTERNAL MEDICINE

## 2023-02-02 PROCEDURE — 87635: ICD-10-PCS | Mod: QW,S$GLB,, | Performed by: INTERNAL MEDICINE

## 2023-02-02 PROCEDURE — 3008F PR BODY MASS INDEX (BMI) DOCUMENTED: ICD-10-PCS | Mod: CPTII,S$GLB,, | Performed by: INTERNAL MEDICINE

## 2023-02-02 PROCEDURE — 1159F PR MEDICATION LIST DOCUMENTED IN MEDICAL RECORD: ICD-10-PCS | Mod: CPTII,S$GLB,, | Performed by: INTERNAL MEDICINE

## 2023-02-02 PROCEDURE — 3288F FALL RISK ASSESSMENT DOCD: CPT | Mod: CPTII,S$GLB,, | Performed by: INTERNAL MEDICINE

## 2023-02-02 PROCEDURE — 99214 OFFICE O/P EST MOD 30 MIN: CPT | Mod: S$GLB,,, | Performed by: INTERNAL MEDICINE

## 2023-02-02 PROCEDURE — 99999 PR PBB SHADOW E&M-EST. PATIENT-LVL IV: CPT | Mod: PBBFAC,,, | Performed by: INTERNAL MEDICINE

## 2023-02-02 PROCEDURE — 87502 INFLUENZA DNA AMP PROBE: CPT | Mod: QW,S$GLB,, | Performed by: INTERNAL MEDICINE

## 2023-02-02 PROCEDURE — 1160F PR REVIEW ALL MEDS BY PRESCRIBER/CLIN PHARMACIST DOCUMENTED: ICD-10-PCS | Mod: CPTII,S$GLB,, | Performed by: INTERNAL MEDICINE

## 2023-02-02 RX ORDER — VIT A/VIT C/VIT E/ZINC/COPPER 4296-226
CAPSULE ORAL
COMMUNITY

## 2023-02-02 RX ORDER — METHYLPREDNISOLONE 4 MG/1
TABLET ORAL
Qty: 21 TABLET | Refills: 0 | Status: SHIPPED | OUTPATIENT
Start: 2023-02-02 | End: 2023-03-16 | Stop reason: ALTCHOICE

## 2023-02-02 NOTE — PROGRESS NOTES
Subjective:       Patient ID: Aye Montanez is a 67 y.o. female.  Chief Complaint: Sore Throat, Cough, and Sinus Problem     HPI      Patient complains of moderate sore throat with clear nasal drainage starting 2 days ago.  Some cough with clear sputum.  No f/c, no fatigue, body aches.     Patient will have surgery on her foot.  Postponed 2nd to #1.  Needs rtc for pre-op.      Recall:     Depression - uncontrolled likely from multiple health issues not being resolved.  Will start Cymbalta soon.  Open to therapy.  Previously weaned off Lexapro.      hypothryoid - on very mild dose, but TSH elevated off this. Needs recheckl     HLD - was uncontrolled, so increased statin.  Needs recheck.  CAD - s/p stents 2016.  NM stress test normal No chest pain.      Sjogrens - Dr Bee  + Joint pain.    ILD? - still having SOB with exertional hypoxia; residual dry cough; January had bronchitis and pleurisy.  Dr Cressy Raynauds      TIA x3 2021.  Last year episode of altered mental status.  Dr Mohan, Neurologist put back Plavix until likely April.  Initially thought to have been a CVA and received tPa.  States that neurology did not feel it was a stroke.   Complex migraine? - Episodes of muscle spasms in face, slurred voice, and right leg twisting uncontrollably.    Aortic atherosclerosis - stable.  Seen on CT     Neck vertebrae.  Left arm numbness in forearm distal.  Pain in left neck causing headaches.  MRI showed disk herniation touching anterior spinal cord at C4,5 and nerve impingement to the left there as well.  Seeing pain management so far without relief.  Refuses pain medicine for now with FH drug abuse.  Sent to neurosurgery, but told did not need surgery?       Episodes of severe abdominal bloating with pain.    Now having bowel (and urine) incontinence.  Has apt with GI.  Related to above?           Assessment:       1. Viral upper respiratory illness    2. Current mild episode of major depressive disorder without  prior episode    3. Other systemic lupus erythematosus with lung involvement    4. Atherosclerosis of aorta          Plan:       Viral upper respiratory illness  -     methylPREDNISolone (MEDROL DOSEPACK) 4 mg tablet; Take as directed  Dispense: 21 tablet; Refill: 0  -     POCT Influenza A/B Molecular  -     POCT COVID-19 Rapid Screening    Current mild episode of major depressive disorder without prior episode    Other systemic lupus erythematosus with lung involvement    Atherosclerosis of aorta            Continue current management and monitor.  Other diagnoses were reviewed and found stable and will continue to monitor.  Counseled on regular exercise, maintenance of a healthy weight, balanced diet rich in fruits/vegetables and lean protein, and avoidance of unhealthy habits like smoking and excessive alcohol intake.   Also, counseled on importance of being compliant with medication, health appointments, diet and exercise.     Follow up in about 6 weeks (around 3/14/2023).      Medication List with Changes/Refills   New Medications    METHYLPREDNISOLONE (MEDROL DOSEPACK) 4 MG TABLET    Take as directed   Current Medications    ACETAMINOPHEN (TYLENOL) 500 MG TABLET    Take 1,000 mg by mouth every 6 (six) hours as needed for Pain.    BELIMUMAB (BENLYSTA) 200 MG/ML SYRG    Inject 1 mL (200 mg total) into the skin once a week.    BENZONATATE (TESSALON PERLES) 100 MG CAPSULE    Take 1 capsule (100 mg total) by mouth every 6 (six) hours as needed for Cough.    CLOPIDOGREL (PLAVIX) 75 MG TABLET    TAKE 1 TABLET BY MOUTH ONCE DAILY WITH ASPIRIN 81MG FOR 21 DAYS.** STOP ASPIRIN AND TAKE PLAVIX ALONE THEREAFTER    DULOXETINE (CYMBALTA) 30 MG CAPSULE    Take 2 capsules (60 mg total) by mouth once daily.    FLUOROMETHOLONE 0.1% (FML) 0.1 % DRPS        FUROSEMIDE (LASIX) 20 MG TABLET    TAKE 1 TABLET(20 MG) BY MOUTH EVERY DAY    HYDROCORTISONE 2.5 % CREAM    Apply topically 2 (two) times daily.    LEVOTHYROXINE (SYNTHROID)  "25 MCG TABLET    Take 1 tablet (25 mcg total) by mouth before breakfast.    MAGNESIUM OXIDE (MAG-OX) 400 MG (241.3 MG MAGNESIUM) TABLET    Take 400 mg by mouth once daily.    OMEPRAZOLE (PRILOSEC) 20 MG CAPSULE    TAKE 1 CAPSULE(20 MG) BY MOUTH EVERY DAY    POTASSIUM CHLORIDE SA (K-DUR,KLOR-CON) 20 MEQ TABLET    TAKE 1 TABLET(20 MEQ) BY MOUTH EVERY DAY    ROSUVASTATIN (CRESTOR) 40 MG TAB    Take 1 tablet (40 mg total) by mouth once daily.    SYRINGE WITH NEEDLE 3 ML 22 GAUGE X 3/4" SYRG    6 Syringes by Misc.(Non-Drug; Combo Route) route every 30 days. Use to inject cyanocobalamin once every 30 days.    VITAMINS A,C,E-ZINC-COPPER (PRESERVISION AREDS) [id number],[id number] UNIT-MG-UNIT CAP    Take by mouth.    ZINC GLUCONATE 50 MG TABLET    Take 50 mg by mouth once daily.       BP Readings from Last 3 Encounters:   02/02/23 128/80   12/14/22 136/88   09/09/22 (!) 152/65     Hemoglobin A1C   Date Value Ref Range Status   10/14/2021 5.6 0.0 - 5.6 % Final     Comment:     Reference Interval:  5.0 - 5.6 Normal   5.7 - 6.4 High Risk   > 6.5 Diabetic      Hgb A1c results are standardized based on the (NGSP) National   Glycohemoglobin Standardization Program.      Hemoglobin A1C levels are related to mean serum/plasma glucose   during the preceding 2-3 months.        09/04/2018 5.2 0.0 - 5.6 % Final     Comment:     Reference Interval:  5.0 - 5.6 Normal   5.7 - 6.4 High Risk   > 6.5 Diabetic    Hgb A1c results are standardized based on the (NGSP) National   Glycohemoglobin Standardization Program.    Hemoglobin A1C levels are related to mean serum/plasma glucose   during the preceding 2-3 months.          Lab Results   Component Value Date    TSH 2.800 09/09/2022     Lab Results   Component Value Date    LDLCALC 72.2 09/09/2022    LDLCALC 95.0 04/27/2022    LDLCALC 90.2 10/12/2021     Lab Results   Component Value Date    TRIG 109 09/09/2022    TRIG 85 04/27/2022    TRIG 69 10/12/2021     Wt Readings from Last 3 Encounters: "   02/02/23 97.4 kg (214 lb 11.7 oz)   09/09/22 98.4 kg (216 lb 14.9 oz)   09/07/22 98.5 kg (217 lb 2.5 oz)     Lab Results   Component Value Date    HGB 12.4 01/13/2023    HCT 38.9 01/13/2023    WBC 5.19 01/13/2023    ALT 12 01/13/2023    AST 21 01/13/2023     01/13/2023    K 4.6 01/13/2023    CREATININE 0.86 01/13/2023           Review of Systems   Constitutional:  Negative for appetite change and fever.   HENT:  Positive for postnasal drip, sinus pressure and sore throat. Negative for nosebleeds and trouble swallowing.    Eyes:  Negative for discharge and visual disturbance.   Respiratory:  Positive for cough. Negative for choking and shortness of breath.    Cardiovascular:  Negative for chest pain and palpitations.   Gastrointestinal:  Negative for abdominal pain and nausea.         Objective:      Vitals:    02/02/23 1013   BP: 128/80   Pulse: 72   Temp: 98.3 °F (36.8 °C)     Physical Exam  Vitals reviewed.   Constitutional:       Appearance: Normal appearance.   HENT:      Head:      Comments: OP + erythema  Eyes:      Conjunctiva/sclera: Conjunctivae normal.   Cardiovascular:      Rate and Rhythm: Normal rate.   Pulmonary:      Effort: Pulmonary effort is normal.      Breath sounds: Normal breath sounds.   Musculoskeletal:      Cervical back: Normal range of motion.      Comments: Normal ROM bilateral    Skin:     General: Skin is warm and dry.   Neurological:      Mental Status: She is alert.      Cranial Nerves: Cranial nerve deficit: grossly intact.   Psychiatric:      Comments: Alert and orientated

## 2023-02-07 ENCOUNTER — TELEPHONE (OUTPATIENT)
Dept: FAMILY MEDICINE | Facility: CLINIC | Age: 68
End: 2023-02-07
Payer: MEDICARE

## 2023-02-07 NOTE — TELEPHONE ENCOUNTER
Patient states she is going to call and let us know when she will have the surgery so she can complete her pre op.    States she is still not feeling well.    Going on vacation in April so will let us know.     Paper is at [facility].

## 2023-02-09 ENCOUNTER — PATIENT MESSAGE (OUTPATIENT)
Dept: PAIN MEDICINE | Facility: CLINIC | Age: 68
End: 2023-02-09
Payer: MEDICARE

## 2023-02-09 DIAGNOSIS — M54.9 DORSALGIA: Primary | ICD-10-CM

## 2023-02-09 NOTE — TELEPHONE ENCOUNTER
Does she have any more tramadol that we previously prescribed her?    I also see her PCP provided her with a Medrol Dosepak.  This should be starting to provide her with relief at this time.

## 2023-02-10 RX ORDER — TRAMADOL HYDROCHLORIDE 50 MG/1
50 TABLET ORAL EVERY 6 HOURS PRN
Qty: 21 TABLET | Refills: 0 | Status: SHIPPED | OUTPATIENT
Start: 2023-02-10 | End: 2023-12-28 | Stop reason: SDUPTHER

## 2023-02-10 NOTE — TELEPHONE ENCOUNTER
I have reviewed the [place] Board of [employer]acy website and there are no abberancies.         Short course of tramadol sent to Dr. Oshea today.  If her pain persists please have her follow-up in clinic.

## 2023-02-16 ENCOUNTER — OFFICE VISIT (OUTPATIENT)
Dept: RHEUMATOLOGY | Facility: CLINIC | Age: 68
End: 2023-02-16
Payer: MEDICARE

## 2023-02-16 VITALS
WEIGHT: 215.94 LBS | HEART RATE: 74 BPM | BODY MASS INDEX: 33.89 KG/M2 | SYSTOLIC BLOOD PRESSURE: 157 MMHG | HEIGHT: 67 IN | DIASTOLIC BLOOD PRESSURE: 82 MMHG

## 2023-02-16 DIAGNOSIS — M32.13 OTHER SYSTEMIC LUPUS ERYTHEMATOSUS WITH LUNG INVOLVEMENT: Primary | ICD-10-CM

## 2023-02-16 DIAGNOSIS — Z79.899 HIGH RISK MEDICATIONS (NOT ANTICOAGULANTS) LONG-TERM USE: ICD-10-CM

## 2023-02-16 PROCEDURE — 99214 PR OFFICE/OUTPT VISIT, EST, LEVL IV, 30-39 MIN: ICD-10-PCS | Mod: S$GLB,,, | Performed by: INTERNAL MEDICINE

## 2023-02-16 PROCEDURE — 1160F RVW MEDS BY RX/DR IN RCRD: CPT | Mod: CPTII,S$GLB,, | Performed by: INTERNAL MEDICINE

## 2023-02-16 PROCEDURE — 1159F PR MEDICATION LIST DOCUMENTED IN MEDICAL RECORD: ICD-10-PCS | Mod: CPTII,S$GLB,, | Performed by: INTERNAL MEDICINE

## 2023-02-16 PROCEDURE — 3008F BODY MASS INDEX DOCD: CPT | Mod: CPTII,S$GLB,, | Performed by: INTERNAL MEDICINE

## 2023-02-16 PROCEDURE — 3079F PR MOST RECENT DIASTOLIC BLOOD PRESSURE 80-89 MM HG: ICD-10-PCS | Mod: CPTII,S$GLB,, | Performed by: INTERNAL MEDICINE

## 2023-02-16 PROCEDURE — 1159F MED LIST DOCD IN RCRD: CPT | Mod: CPTII,S$GLB,, | Performed by: INTERNAL MEDICINE

## 2023-02-16 PROCEDURE — 99214 OFFICE O/P EST MOD 30 MIN: CPT | Mod: S$GLB,,, | Performed by: INTERNAL MEDICINE

## 2023-02-16 PROCEDURE — 1101F PR PT FALLS ASSESS DOC 0-1 FALLS W/OUT INJ PAST YR: ICD-10-PCS | Mod: CPTII,S$GLB,, | Performed by: INTERNAL MEDICINE

## 2023-02-16 PROCEDURE — 3288F PR FALLS RISK ASSESSMENT DOCUMENTED: ICD-10-PCS | Mod: CPTII,S$GLB,, | Performed by: INTERNAL MEDICINE

## 2023-02-16 PROCEDURE — 3077F SYST BP >= 140 MM HG: CPT | Mod: CPTII,S$GLB,, | Performed by: INTERNAL MEDICINE

## 2023-02-16 PROCEDURE — 1160F PR REVIEW ALL MEDS BY PRESCRIBER/CLIN PHARMACIST DOCUMENTED: ICD-10-PCS | Mod: CPTII,S$GLB,, | Performed by: INTERNAL MEDICINE

## 2023-02-16 PROCEDURE — 99999 PR PBB SHADOW E&M-EST. PATIENT-LVL IV: ICD-10-PCS | Mod: PBBFAC,,, | Performed by: INTERNAL MEDICINE

## 2023-02-16 PROCEDURE — 3008F PR BODY MASS INDEX (BMI) DOCUMENTED: ICD-10-PCS | Mod: CPTII,S$GLB,, | Performed by: INTERNAL MEDICINE

## 2023-02-16 PROCEDURE — 1126F PR PAIN SEVERITY QUANTIFIED, NO PAIN PRESENT: ICD-10-PCS | Mod: CPTII,S$GLB,, | Performed by: INTERNAL MEDICINE

## 2023-02-16 PROCEDURE — 3079F DIAST BP 80-89 MM HG: CPT | Mod: CPTII,S$GLB,, | Performed by: INTERNAL MEDICINE

## 2023-02-16 PROCEDURE — 99999 PR PBB SHADOW E&M-EST. PATIENT-LVL IV: CPT | Mod: PBBFAC,,, | Performed by: INTERNAL MEDICINE

## 2023-02-16 PROCEDURE — 3288F FALL RISK ASSESSMENT DOCD: CPT | Mod: CPTII,S$GLB,, | Performed by: INTERNAL MEDICINE

## 2023-02-16 PROCEDURE — 1101F PT FALLS ASSESS-DOCD LE1/YR: CPT | Mod: CPTII,S$GLB,, | Performed by: INTERNAL MEDICINE

## 2023-02-16 PROCEDURE — 1126F AMNT PAIN NOTED NONE PRSNT: CPT | Mod: CPTII,S$GLB,, | Performed by: INTERNAL MEDICINE

## 2023-02-16 PROCEDURE — 3077F PR MOST RECENT SYSTOLIC BLOOD PRESSURE >= 140 MM HG: ICD-10-PCS | Mod: CPTII,S$GLB,, | Performed by: INTERNAL MEDICINE

## 2023-02-16 RX ORDER — AMLODIPINE BESYLATE 2.5 MG/1
2.5 TABLET ORAL DAILY
Qty: 30 TABLET | Refills: 3 | Status: SHIPPED | OUTPATIENT
Start: 2023-02-16 | End: 2024-02-16

## 2023-02-16 ASSESSMENT — ROUTINE ASSESSMENT OF PATIENT INDEX DATA (RAPID3)
PATIENT GLOBAL ASSESSMENT SCORE: 6
PAIN SCORE: 6
TOTAL RAPID3 SCORE: 5.22
PSYCHOLOGICAL DISTRESS SCORE: 2.2
MDHAQ FUNCTION SCORE: 1.1
FATIGUE SCORE: 1.1

## 2023-02-16 NOTE — PATIENT INSTRUCTIONS
Throw out the nifedipine!!    I am going to give you amlodipine but at a teeny dose so unlikely to alter blood pressure.     I will check if we can get topical nitroglycerin

## 2023-02-16 NOTE — PROGRESS NOTES
"Final draft pending.   Subjective:          Chief Complaint: Aye Montanez is a 67 y.o. female who had concerns including Disease Management.    HPI:  All events:  Benlysta infusion too costly  Started Benlysta SQ 5/13/22 and continues.   Since then pain is 5/10 hands, legs and ankles.   Working with pain and NSGY for right hand weakness and numbnes, right sided occipital and facial numbness. MRI Brain, c spine, t spine an l spine w/o any clear explaination  Recent c/o diarrhea since 5/2022- working with GI but no abnormality on CT ABD. Held her Lyrica and markedly improved.   Started Cymbalta was started which has helped with her neve pain right face, she notes many of her random "zingers" in her body are lessened.     Since start Benlysta feet, ankles, hands and wrist are better. Less swelling, less stiffness , less pain. And tolerating. Still with fatigue, still with very dry eyes that are inflamed.   I was hoping ESR to be better.   No percogesic. Using tylenol arthritis- twice daily seems to be helping more than it did in the past.     She failed MTX  HCQ was ? But reviewed Meng last note I have and she stopped HCQ 2017 but no notation of maculopathy    Patient is having oligoarticular swelling in hands and feet. She is having peripheral polyarthritis. Severe dryness of her eyes.   She did clarify that Dr. Fan did not see maculopathy but only expressed his concerns about the eye.         Patient is a 67-year-old female she has a history of Sjogren syndrome (estimate start 2015)   To review her Rheum diagnosis:  presumed Sjogrens with ? of SLE  +OSMAN 1:1280 speckled  High titer SSA and SSB with + dry eye and dry mouth  dsDNA neg, Singh neg, RNP neg. C3 and C4 WNL.   + hx of thrombocytopenia, + leukopenia, +polyarthritis, + sicca, +Raynauds  RF and CCP negative.   She has c/o no strength in her hands, knees are painful. Feet burn with standing. No overt swelling of joints with the exception of left voler " wrist synovial cyst.    Diffuse pruritis w/o visible rash.   Trialed HCQ but patient concerned about macula and discontinued. She is followed by Dr. Fan the [facility].  She was taken off HCQ no active maculopathy but she was concerned about ASE. .   Trialed on MTX and failed.  Restasis despite burning back on  +punctal plugs. Did try Xiidra- ? If ever taken this visit.  She notes burning in her eyes.  Photophobia occasional redness to her eyes. burning Restasis no longer tolerable.    Never salagen/Evozac. . She did try xylimelts      Patient developed neurologic events starting 1/2021- but we discontinued nifedipine (Raynaud's) in event this was leading to hypotension. Patient described episode  with disorientation, tingling lips.  repeat episode 5/25/21 with vertigo and near syncope and again 6/2/21 slurred speech, right facial droop, Imagin has been neg. for acute changes. on 6/3/21 similar event but now on left. 8/2021 another episode.   Dr. Mccurdy performed an EMG which showed mild left C6 radiculopathy. MRI showed multilevel degenerative changes and a cyst at C6/7 on the right.   EEG 2o21 WNL.   Neuro dx: complex migrain vs TIA changes ASA to Plavix.   right leg numbness referred to vestibular therapy.   HA: consider occipital nerve block  Cervical spine and left arm symptoms Pain management. trial with Tramadol for now and medrol and if not improved will discuss SONYA once safe to hold Plavix. She elected not to take Lyrica. no reported ASE.     Patient has been having SOB, dizzyness. she has maximized therapy with Pulm with little improvement. Her last PFTs show no diffusion impairment, not hypoxic   PFT Results  02/26/2019: FEV1 2.20 (89%), FEV1/FVC 85, TLC 93%, DLCO 111%    Acute Pleurisy 12/2021 with right chest pain and pain with inspiration. COVID testing neg. Prednisone and Levaquin started. +productive cough.   : CTA neg for PE. but reticulonodular opacities compatible with pneumonitis ANGELIQUE  and LLL with patchy infiltrate in the RML and RLL. Trace right pleural effusion.   Seen with Pulm as of 1/12/22 given azithro   Cardiac: ECHO normal EF, no shunt, The estimated PA systolic pressure is 35 mmHg  completed stress testing with Dr. Peter ([facility]) pt states no new changes. +CAD with stenting 2017.         9/2021: continued neurologic events so we held off on starting Benlysta until completed work up. More recently presented to the ER with possible TIA.  We discontinued nifedipine for possible hypotensive episode are Raynaud's have been stable.   Events surrounding the 1st episode seem to be blood donation bilateral vertebral artery stenosis presenting with nystagmus vertigo and ataxia remark resolved with IV hydration and discontinuation of nifedipine.  Second event on did not involve a similar constellation of symptoms and just involved numbness and tingling.   continues with dizziness, mild SOB pending cardiac w/up with Dr. Peter      1/2021: flare with burning eyes, joint aches and pains and Raynauds persistent for 10 days. She tried hot shower/soak which helped temporarily. Started nifedipine 30mg daily 1/14/21 by 1/22/21 with episode of disorientation and hypotension. Full work up no seizure, no migraine she was aware at the time. Was having dizziness. No motor weakness. Slurred speech. Imaging revealed b/l vetebral arteries at 50%. And no seizure activity.   Off Nifedipine at BP marked improved. Unfortunately very effective for Raynauds.     Submitted for Benlysta with no affordable option for home injection despite [place] support.     S/p viscosupplementation in knees with ortho 3/2018      Component      Latest Ref Rng & Units 8/16/2018   Anti Sm Antibody      0.00 - 19.99 EU 2.85   Anti-Sm Interpretation      Negative Negative   Anti-SSA Antibody      0.00 - 19.99 .02 (H)   Anti-SSA Interpretation      Negative Positive (A)   Anti-SSB Antibody      0.00 - 19.99 .37 (H)   Anti-SSB  Interpretation      Negative Positive (A)   Sed Rate      0 - 20 mm/Hr 33 (H)   CRP      0.0 - 8.2 mg/L 3.6   OSMAN Screen      Negative <1:160 Positive (A)   Complement (C-3)      50 - 180 mg/dL 129   Complement (C-4)      11 - 44 mg/dL 22   Complement,Total, Serum      42 - 95 U/mL 85   Rheumatoid Factor      0.0 - 15.0 IU/mL <10.0   CCP Antibodies      <5.0 U/mL <0.5     REVIEW OF SYSTEMS:    Review of Systems   Constitutional:  Positive for malaise/fatigue. Negative for fever and weight loss.   HENT:  Negative for sore throat.    Eyes:  Negative for double vision, photophobia and redness.   Respiratory:  Negative for cough, shortness of breath and wheezing.    Cardiovascular:  Negative for chest pain, palpitations and orthopnea.   Gastrointestinal:  Negative for abdominal pain, constipation and diarrhea.   Genitourinary:  Negative for dysuria, hematuria and urgency.   Musculoskeletal:  Positive for joint pain. Negative for back pain and myalgias.   Skin:  Positive for itching. Negative for rash.   Neurological:  Negative for dizziness, tingling, focal weakness and headaches.   Endo/Heme/Allergies:  Does not bruise/bleed easily.   Psychiatric/Behavioral:  Negative for depression, hallucinations and suicidal ideas.              Objective:            Past Medical History:   Diagnosis Date    Sjogren's syndrome 12/05/2016    Systemic lupus erythematosus, organ or system involvement unspecified      Family History   Problem Relation Age of Onset    Cancer Mother     Ovarian cancer Mother 40    Cancer Father         esophageal    Esophageal cancer Father     Stroke Maternal Aunt     Rheum arthritis Maternal Aunt     Rheum arthritis Paternal Uncle     Breast cancer Maternal Cousin     Breast cancer Maternal Cousin     Glaucoma Neg Hx     Macular degeneration Neg Hx      Social History     Tobacco Use    Smoking status: Never    Smokeless tobacco: Never   Substance Use Topics    Alcohol use: Yes     Comment: rarely     "Drug use: No         Current Outpatient Medications on File Prior to Visit   Medication Sig Dispense Refill    acetaminophen (TYLENOL) 500 MG tablet Take 1,000 mg by mouth every 6 (six) hours as needed for Pain.      belimumab (BENLYSTA) 200 mg/mL Syrg Inject 1 mL (200 mg total) into the skin once a week. 4 mL 11    clopidogreL (PLAVIX) 75 mg tablet TAKE 1 TABLET BY MOUTH ONCE DAILY WITH ASPIRIN 81MG FOR 21 DAYS.** STOP ASPIRIN AND TAKE PLAVIX ALONE THEREAFTER 90 tablet 1    DULoxetine (CYMBALTA) 30 MG capsule Take 2 capsules (60 mg total) by mouth once daily. 60 capsule 03    fluorometholone 0.1% (FML) 0.1 % DrpS       furosemide (LASIX) 20 MG tablet TAKE 1 TABLET(20 MG) BY MOUTH EVERY DAY 90 tablet 1    hydrocortisone 2.5 % cream Apply topically 2 (two) times daily. 1 each 1    levothyroxine (SYNTHROID) 25 MCG tablet Take 1 tablet (25 mcg total) by mouth before breakfast. 30 tablet 11    magnesium oxide (MAG-OX) 400 mg (241.3 mg magnesium) tablet Take 400 mg by mouth once daily.      methylPREDNISolone (MEDROL DOSEPACK) 4 mg tablet Take as directed 21 tablet 0    omeprazole (PRILOSEC) 20 MG capsule TAKE 1 CAPSULE(20 MG) BY MOUTH EVERY DAY 90 capsule 1    potassium chloride SA (K-DUR,KLOR-CON) 20 MEQ tablet TAKE 1 TABLET(20 MEQ) BY MOUTH EVERY DAY 90 tablet 1    rosuvastatin (CRESTOR) 40 MG Tab Take 1 tablet (40 mg total) by mouth once daily. 90 tablet 3    syringe with needle 3 mL 22 gauge x 3/4" Syrg 6 Syringes by Misc.(Non-Drug; Combo Route) route every 30 days. Use to inject cyanocobalamin once every 30 days.      traMADoL (ULTRAM) 50 mg tablet Take 1 tablet (50 mg total) by mouth every 6 (six) hours as needed for Pain. 21 tablet 0    vitamins A,C,E-zinc-copper (PRESERVISION AREDS) 14,320-226-200 unit-mg-unit Cap Take by mouth.      zinc gluconate 50 mg tablet Take 50 mg by mouth once daily.      benzonatate (TESSALON PERLES) 100 MG capsule Take 1 capsule (100 mg total) by mouth every 6 (six) hours as needed " for Cough. (Patient not taking: Reported on 2/2/2023) 30 capsule 1     No current facility-administered medications on file prior to visit.       Vitals:    02/16/23 1616   BP: (!) 157/82   Pulse: 74       Physical Exam:    Physical Exam  Constitutional:       Appearance: Normal appearance. She is well-developed.   HENT:      Nose: No septal deviation.      Mouth/Throat:      Mouth: No oral lesions.   Eyes:      Conjunctiva/sclera:      Right eye: Right conjunctiva is not injected.      Left eye: Left conjunctiva is not injected.      Pupils: Pupils are equal, round, and reactive to light.   Neck:      Thyroid: No thyroid mass or thyromegaly.      Vascular: No JVD.   Cardiovascular:      Rate and Rhythm: Normal rate and regular rhythm.      Pulses: Normal pulses.      Comments: No edema  Pulmonary:      Effort: Pulmonary effort is normal.      Breath sounds: Normal breath sounds.   Abdominal:      Palpations: Abdomen is soft.   Musculoskeletal:      Right shoulder: Tenderness present. No swelling. Normal range of motion.      Left shoulder: Tenderness present. No swelling. Normal range of motion.      Right elbow: No swelling. Normal range of motion. No tenderness.      Left elbow: No swelling. Normal range of motion. No tenderness.      Right wrist: Tenderness present. No swelling. Normal range of motion.      Left wrist: Tenderness present. No swelling. Normal range of motion.      Right hand: Tenderness present.      Left hand: Tenderness present.      Right hip: Normal range of motion. Normal strength.      Left hip: No tenderness. Normal range of motion.      Right knee: No swelling. Normal range of motion. No tenderness.      Left knee: No swelling. Normal range of motion. No tenderness.      Right ankle: No swelling. No tenderness. Normal range of motion.      Left ankle: No swelling. No tenderness. Normal range of motion.      Comments: Patient has some tenderness on the right MCP no overt synovitis no  deformities.  No evidence of Raynaud's today but historical triphasic color change  She has marked crepitation bilateral knees with limitation in flexion she has full extension no laxity within the joint no active effusion.      Lymphadenopathy:      Cervical: No cervical adenopathy.   Skin:     General: Skin is dry.   Neurological:      Deep Tendon Reflexes: Reflexes are normal and symmetric.             Assessment:       No diagnosis found.         Plan:        There are no diagnoses linked to this encounter.      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  Increasing joint stiffness and swelling. We have monitored SANDOVAL/SOB ? Some ILD-CTD.    She was hesitant to try salagen.   Failed MTX with ASE   Discussed the joints, increasing leucopenia, worsening dry eyes, malaise and fatigue. And recent pleurisy unclear if this was infectious but chronic SOB/SANDOVAL despite maximized therapy.    HCQ may need to be re-visited. No confirmed maculopathy it was a rec from opho that the drug is a risk. I need to reconsider if this is an option for her.           Quantiferon gold. Is negative.     Start Benlysta with self injection and training today  Labs every 3 months  Off cellcept not tolerating.     No follow-ups on file.        F/u 4 months  Thank you for allowing me to participate in the care of this very pleasant patient.    30 min consultation with greater than 50% spent in counseling, chart review and coordination of care. All questions answered.

## 2023-02-23 ENCOUNTER — PATIENT MESSAGE (OUTPATIENT)
Dept: RHEUMATOLOGY | Facility: CLINIC | Age: 68
End: 2023-02-23
Payer: MEDICARE

## 2023-02-25 ENCOUNTER — PATIENT MESSAGE (OUTPATIENT)
Dept: ORTHOPEDICS | Facility: CLINIC | Age: 68
End: 2023-02-25
Payer: MEDICARE

## 2023-02-25 ENCOUNTER — PATIENT MESSAGE (OUTPATIENT)
Dept: RHEUMATOLOGY | Facility: CLINIC | Age: 68
End: 2023-02-25
Payer: MEDICARE

## 2023-02-27 ENCOUNTER — PATIENT MESSAGE (OUTPATIENT)
Dept: RHEUMATOLOGY | Facility: CLINIC | Age: 68
End: 2023-02-27
Payer: MEDICARE

## 2023-03-06 ENCOUNTER — PATIENT MESSAGE (OUTPATIENT)
Dept: ORTHOPEDICS | Facility: CLINIC | Age: 68
End: 2023-03-06
Payer: MEDICARE

## 2023-03-08 ENCOUNTER — TELEPHONE (OUTPATIENT)
Dept: ORTHOPEDICS | Facility: CLINIC | Age: 68
End: 2023-03-08
Payer: MEDICARE

## 2023-03-08 DIAGNOSIS — M17.12 PRIMARY OSTEOARTHRITIS OF LEFT KNEE: Primary | ICD-10-CM

## 2023-03-08 NOTE — TELEPHONE ENCOUNTER
----- Message from Ember Kirkpatrick MA sent at 3/8/2023  2:13 PM CST -----  Contact: pt  Wants to be seen sooner   Call back  [phone]

## 2023-03-09 ENCOUNTER — TELEPHONE (OUTPATIENT)
Dept: ORTHOPEDICS | Facility: CLINIC | Age: 68
End: 2023-03-09
Payer: MEDICARE

## 2023-03-09 NOTE — TELEPHONE ENCOUNTER
----- Message from Iván Damon sent at 3/9/2023 12:04 PM CST -----  Regarding: returned you call try again   Contact: pt   returned you call try again,  pt

## 2023-03-10 ENCOUNTER — OFFICE VISIT (OUTPATIENT)
Dept: ORTHOPEDICS | Facility: CLINIC | Age: 68
End: 2023-03-10
Payer: MEDICARE

## 2023-03-10 ENCOUNTER — HOSPITAL ENCOUNTER (OUTPATIENT)
Dept: RADIOLOGY | Facility: HOSPITAL | Age: 68
Discharge: HOME | End: 2023-03-10
Attending: ORTHOPAEDIC SURGERY
Payer: MEDICARE

## 2023-03-10 ENCOUNTER — PATIENT MESSAGE (OUTPATIENT)
Dept: PAIN MEDICINE | Facility: CLINIC | Age: 68
End: 2023-03-10
Payer: MEDICARE

## 2023-03-10 VITALS — HEIGHT: 67 IN | BODY MASS INDEX: 33.74 KG/M2 | WEIGHT: 215 LBS

## 2023-03-10 DIAGNOSIS — M25.562 LEFT KNEE PAIN: ICD-10-CM

## 2023-03-10 DIAGNOSIS — M79.669 LOWER LEG PAIN: ICD-10-CM

## 2023-03-10 DIAGNOSIS — M17.12 PRIMARY OSTEOARTHRITIS OF LEFT KNEE: Primary | ICD-10-CM

## 2023-03-10 DIAGNOSIS — M17.12 PRIMARY OSTEOARTHRITIS OF LEFT KNEE: ICD-10-CM

## 2023-03-10 PROCEDURE — 1160F PR REVIEW ALL MEDS BY PRESCRIBER/CLIN PHARMACIST DOCUMENTED: ICD-10-PCS | Mod: CPTII,S$GLB,, | Performed by: ORTHOPAEDIC SURGERY

## 2023-03-10 PROCEDURE — 1159F PR MEDICATION LIST DOCUMENTED IN MEDICAL RECORD: ICD-10-PCS | Mod: CPTII,S$GLB,, | Performed by: ORTHOPAEDIC SURGERY

## 2023-03-10 PROCEDURE — 3008F BODY MASS INDEX DOCD: CPT | Mod: CPTII,S$GLB,, | Performed by: ORTHOPAEDIC SURGERY

## 2023-03-10 PROCEDURE — 3008F PR BODY MASS INDEX (BMI) DOCUMENTED: ICD-10-PCS | Mod: CPTII,S$GLB,, | Performed by: ORTHOPAEDIC SURGERY

## 2023-03-10 PROCEDURE — 93971 US LOWER EXTREMITY VEINS LEFT: ICD-10-PCS | Mod: 26,LT,, | Performed by: RADIOLOGY

## 2023-03-10 PROCEDURE — 99213 OFFICE O/P EST LOW 20 MIN: CPT | Mod: S$GLB,,, | Performed by: ORTHOPAEDIC SURGERY

## 2023-03-10 PROCEDURE — 3288F FALL RISK ASSESSMENT DOCD: CPT | Mod: CPTII,S$GLB,, | Performed by: ORTHOPAEDIC SURGERY

## 2023-03-10 PROCEDURE — 1101F PR PT FALLS ASSESS DOC 0-1 FALLS W/OUT INJ PAST YR: ICD-10-PCS | Mod: CPTII,S$GLB,, | Performed by: ORTHOPAEDIC SURGERY

## 2023-03-10 PROCEDURE — 1125F AMNT PAIN NOTED PAIN PRSNT: CPT | Mod: CPTII,S$GLB,, | Performed by: ORTHOPAEDIC SURGERY

## 2023-03-10 PROCEDURE — 99999 PR PBB SHADOW E&M-EST. PATIENT-LVL III: ICD-10-PCS | Mod: PBBFAC,,, | Performed by: ORTHOPAEDIC SURGERY

## 2023-03-10 PROCEDURE — 1125F PR PAIN SEVERITY QUANTIFIED, PAIN PRESENT: ICD-10-PCS | Mod: CPTII,S$GLB,, | Performed by: ORTHOPAEDIC SURGERY

## 2023-03-10 PROCEDURE — 1101F PT FALLS ASSESS-DOCD LE1/YR: CPT | Mod: CPTII,S$GLB,, | Performed by: ORTHOPAEDIC SURGERY

## 2023-03-10 PROCEDURE — 99213 PR OFFICE/OUTPT VISIT, EST, LEVL III, 20-29 MIN: ICD-10-PCS | Mod: S$GLB,,, | Performed by: ORTHOPAEDIC SURGERY

## 2023-03-10 PROCEDURE — 3288F PR FALLS RISK ASSESSMENT DOCUMENTED: ICD-10-PCS | Mod: CPTII,S$GLB,, | Performed by: ORTHOPAEDIC SURGERY

## 2023-03-10 PROCEDURE — 1160F RVW MEDS BY RX/DR IN RCRD: CPT | Mod: CPTII,S$GLB,, | Performed by: ORTHOPAEDIC SURGERY

## 2023-03-10 PROCEDURE — 99999 PR PBB SHADOW E&M-EST. PATIENT-LVL III: CPT | Mod: PBBFAC,,, | Performed by: ORTHOPAEDIC SURGERY

## 2023-03-10 PROCEDURE — 1159F MED LIST DOCD IN RCRD: CPT | Mod: CPTII,S$GLB,, | Performed by: ORTHOPAEDIC SURGERY

## 2023-03-10 PROCEDURE — 93971 EXTREMITY STUDY: CPT | Mod: TC,PO,LT

## 2023-03-10 PROCEDURE — 93971 EXTREMITY STUDY: CPT | Mod: 26,LT,, | Performed by: RADIOLOGY

## 2023-03-10 NOTE — PROGRESS NOTES
68 years old excruciating pain in her left lower extremity mostly in her tibial area.  Difficulty getting around rates the pain is 10 on the pain scale.  Any movement is painful for     Exam shows no signs infection I can not detect an effusion on today's exam about her knee compartments are soft in her leg able to move her ankle, no pain with passive motion of her knee    X-rays show arthritic changes of the knee     Assessment:  Left lower extremity pain uncertain etiology     Plan:  Ultrasound of the leg rule out DVT, MRI of the leg, consideration of revisiting her lumbar spine, certainly her knee arthritis would be unlikely to cause this amount of discomfort for her

## 2023-03-13 ENCOUNTER — PES CALL (OUTPATIENT)
Dept: ADMINISTRATIVE | Facility: OTHER | Age: 68
End: 2023-03-13
Payer: MEDICARE

## 2023-03-13 ENCOUNTER — OFFICE VISIT (OUTPATIENT)
Dept: PAIN MEDICINE | Facility: CLINIC | Age: 68
End: 2023-03-13
Payer: MEDICARE

## 2023-03-13 ENCOUNTER — TELEPHONE (OUTPATIENT)
Dept: PAIN MEDICINE | Facility: CLINIC | Age: 68
End: 2023-03-13

## 2023-03-13 VITALS
HEART RATE: 70 BPM | SYSTOLIC BLOOD PRESSURE: 173 MMHG | BODY MASS INDEX: 33.74 KG/M2 | WEIGHT: 215 LBS | DIASTOLIC BLOOD PRESSURE: 78 MMHG | HEIGHT: 67 IN

## 2023-03-13 DIAGNOSIS — M54.16 LUMBAR RADICULOPATHY: Primary | ICD-10-CM

## 2023-03-13 DIAGNOSIS — M47.816 LUMBAR SPONDYLOSIS: ICD-10-CM

## 2023-03-13 DIAGNOSIS — M54.9 DORSALGIA: ICD-10-CM

## 2023-03-13 PROCEDURE — 1125F AMNT PAIN NOTED PAIN PRSNT: CPT | Mod: CPTII,S$GLB,,

## 2023-03-13 PROCEDURE — 3078F DIAST BP <80 MM HG: CPT | Mod: CPTII,S$GLB,,

## 2023-03-13 PROCEDURE — 1101F PR PT FALLS ASSESS DOC 0-1 FALLS W/OUT INJ PAST YR: ICD-10-PCS | Mod: CPTII,S$GLB,,

## 2023-03-13 PROCEDURE — 1125F PR PAIN SEVERITY QUANTIFIED, PAIN PRESENT: ICD-10-PCS | Mod: CPTII,S$GLB,,

## 2023-03-13 PROCEDURE — 3077F SYST BP >= 140 MM HG: CPT | Mod: CPTII,S$GLB,,

## 2023-03-13 PROCEDURE — 3077F PR MOST RECENT SYSTOLIC BLOOD PRESSURE >= 140 MM HG: ICD-10-PCS | Mod: CPTII,S$GLB,,

## 2023-03-13 PROCEDURE — 1101F PT FALLS ASSESS-DOCD LE1/YR: CPT | Mod: CPTII,S$GLB,,

## 2023-03-13 PROCEDURE — 99999 PR PBB SHADOW E&M-EST. PATIENT-LVL II: CPT | Mod: PBBFAC,,,

## 2023-03-13 PROCEDURE — 3008F BODY MASS INDEX DOCD: CPT | Mod: CPTII,S$GLB,,

## 2023-03-13 PROCEDURE — 99999 PR PBB SHADOW E&M-EST. PATIENT-LVL II: ICD-10-PCS | Mod: PBBFAC,,,

## 2023-03-13 PROCEDURE — 99214 PR OFFICE/OUTPT VISIT, EST, LEVL IV, 30-39 MIN: ICD-10-PCS | Mod: S$GLB,,,

## 2023-03-13 PROCEDURE — 3288F FALL RISK ASSESSMENT DOCD: CPT | Mod: CPTII,S$GLB,,

## 2023-03-13 PROCEDURE — 3288F PR FALLS RISK ASSESSMENT DOCUMENTED: ICD-10-PCS | Mod: CPTII,S$GLB,,

## 2023-03-13 PROCEDURE — 3008F PR BODY MASS INDEX (BMI) DOCUMENTED: ICD-10-PCS | Mod: CPTII,S$GLB,,

## 2023-03-13 PROCEDURE — 3078F PR MOST RECENT DIASTOLIC BLOOD PRESSURE < 80 MM HG: ICD-10-PCS | Mod: CPTII,S$GLB,,

## 2023-03-13 PROCEDURE — 99214 OFFICE O/P EST MOD 30 MIN: CPT | Mod: S$GLB,,,

## 2023-03-13 NOTE — H&P (VIEW-ONLY)
[facility] Follow Up Evaluation    Referred by: Dr. Skinner  Reason for referral: neck pain    CC:   Chief Complaint   Patient presents with    Leg Pain     LEFT    Low-back Pain        Last 3 PDI Scores 6/24/2022 10/8/2021   Pain Disability Index (PDI) 37 31     Interval HPI 3/13/2023: Aye Montanez returns to the clinic for follow up.  Today she is reporting worsening low back pain, 8/10, starting in her left lower back and radiating down left leg into her foot.  She has known left knee issues but feels like this pain is different.  She is been taking tramadol, Tylenol, Cymbalta and a Medrol Dosepak without significant relief of her pain.  She denies any associated numbness or sugey weakness but feels like she has pain related weakness.  She denies any changes to her bowel or bladder function.  She avoids NSAIDs due to being on Plavix.    Pain Intervention History:  - s/p cervical SONYA on 6/10/2022 with no relief of her pain.       HPI:   Aye Montanez is a 68 y.o. female who complains of neck pain    Onset: about 10 months  Progression: since onset, pain is gradually worsening  Typical Range: 6-10/10  Timing: constant  Quality: aching, numb, burning, tingling  Radiation: yes, down both arms  Associated numbness or weakness: yes numbness, yes weakness    Exacerbated by: lifting  Allievated by: rest, laying  Is Pain Level Acceptable?: No    Previous Therapies:  PT/OT:   HEP:   Interventions:   Surgery:  Medications:   - NSAIDS:   - MSK Relaxants:   - TCAs:   - SNRIs: cymbalta   - Topicals:   - Anticonvulsants:  Lyrica-did not tolerate gave her diarrhea.  - Opioids:     History:    Current Outpatient Medications:     acetaminophen (TYLENOL) 500 MG tablet, Take 1,000 mg by mouth every 6 (six) hours as needed for Pain., Disp: , Rfl:     amLODIPine (NORVASC) 2.5 MG tablet, Take 1 tablet (2.5 mg total) by mouth once daily., Disp: 30 tablet, Rfl: 3    belimumab (BENLYSTA) 200 mg/mL Syrg, Inject 1 mL (200 mg  "total) into the skin once a week., Disp: 4 mL, Rfl: 11    benzonatate (TESSALON PERLES) 100 MG capsule, Take 1 capsule (100 mg total) by mouth every 6 (six) hours as needed for Cough., Disp: 30 capsule, Rfl: 1    clopidogreL (PLAVIX) 75 mg tablet, TAKE 1 TABLET BY MOUTH ONCE DAILY WITH ASPIRIN 81MG FOR 21 DAYS.** STOP ASPIRIN AND TAKE PLAVIX ALONE THEREAFTER, Disp: 90 tablet, Rfl: 1    DULoxetine (CYMBALTA) 30 MG capsule, Take 2 capsules (60 mg total) by mouth once daily., Disp: 60 capsule, Rfl: 03    fluorometholone 0.1% (FML) 0.1 % DrpS, , Disp: , Rfl:     furosemide (LASIX) 20 MG tablet, TAKE 1 TABLET(20 MG) BY MOUTH EVERY DAY, Disp: 90 tablet, Rfl: 1    hydrocortisone 2.5 % cream, Apply topically 2 (two) times daily., Disp: 1 each, Rfl: 1    levothyroxine (SYNTHROID) 25 MCG tablet, Take 1 tablet (25 mcg total) by mouth before breakfast., Disp: 30 tablet, Rfl: 11    magnesium oxide (MAG-OX) 400 mg (241.3 mg magnesium) tablet, Take 400 mg by mouth once daily., Disp: , Rfl:     methylPREDNISolone (MEDROL DOSEPACK) 4 mg tablet, Take as directed, Disp: 21 tablet, Rfl: 0    omeprazole (PRILOSEC) 20 MG capsule, TAKE 1 CAPSULE(20 MG) BY MOUTH EVERY DAY, Disp: 90 capsule, Rfl: 1    potassium chloride SA (K-DUR,KLOR-CON) 20 MEQ tablet, TAKE 1 TABLET(20 MEQ) BY MOUTH EVERY DAY, Disp: 90 tablet, Rfl: 1    rosuvastatin (CRESTOR) 40 MG Tab, Take 1 tablet (40 mg total) by mouth once daily., Disp: 90 tablet, Rfl: 3    syringe with needle 3 mL 22 gauge x 3/4" Syrg, 6 Syringes by Misc.(Non-Drug; Combo Route) route every 30 days. Use to inject cyanocobalamin once every 30 days., Disp: , Rfl:     traMADoL (ULTRAM) 50 mg tablet, Take 1 tablet (50 mg total) by mouth every 6 (six) hours as needed for Pain., Disp: 21 tablet, Rfl: 0    vitamins A,C,E-zinc-copper (PRESERVISION AREDS) 14,320-199-200 unit-mg-unit Cap, Take by mouth., Disp: , Rfl:     zinc gluconate 50 mg tablet, Take 50 mg by mouth once daily., Disp: , Rfl:     Past " Medical History:   Diagnosis Date    Sjogren's syndrome 12/05/2016    Systemic lupus erythematosus, organ or system involvement unspecified        Past Surgical History:   Procedure Laterality Date    APPENDECTOMY      CHOLECYSTECTOMY      COLONOSCOPY      COLONOSCOPY N/A 6/9/2022    Procedure: COLONOSCOPY;  Surgeon: Eugene Del Castillo MD;  Location: [facility] ENDO;  Service: Endoscopy;  Laterality: N/A;    CORONARY STENT PLACEMENT      10/2017    EPIDURAL STEROID INJECTION INTO CERVICAL SPINE N/A 6/10/2022    Procedure: Injection-steroid-epidural-cervical;  Surgeon: Dickson Oshea MD;  Location: [facility] OR;  Service: Pain Management;  Laterality: N/A;    ESOPHAGOGASTRODUODENOSCOPY N/A 6/9/2022    Procedure: EGD (ESOPHAGOGASTRODUODENOSCOPY);  Surgeon: Eugene Del Castillo MD;  Location: [facility] ENDO;  Service: Endoscopy;  Laterality: N/A;    FRACTURE SURGERY Right     foot    HYSTERECTOMY      1984    KNEE ARTHROSCOPY Right 2/2016    OOPHORECTOMY Bilateral     hyst. 1984    TOTAL REDUCTION MAMMOPLASTY Bilateral     1999    UPPER GASTROINTESTINAL ENDOSCOPY         Family History   Problem Relation Age of Onset    Cancer Mother     Ovarian cancer Mother 40    Cancer Father         esophageal    Esophageal cancer Father     Stroke Maternal Aunt     Rheum arthritis Maternal Aunt     Rheum arthritis Paternal Uncle     Breast cancer Maternal Cousin     Breast cancer Maternal Cousin     Glaucoma Neg Hx     Macular degeneration Neg Hx        Social History     Socioeconomic History    Marital status:    Tobacco Use    Smoking status: Never    Smokeless tobacco: Never   Substance and Sexual Activity    Alcohol use: Yes     Comment: rarely    Drug use: No    Sexual activity: Yes     Partners: Male     Social Determinants of Health     Financial Resource Strain: Low Risk     Difficulty of Paying Living Expenses: Not hard at all   Food Insecurity: No Food Insecurity    Worried About Running Out of Food in the Last Year: Never true  "   Ran Out of Food in the Last Year: Never true   Transportation Needs: No Transportation Needs    Lack of Transportation (Medical): No    Lack of Transportation (Non-Medical): No   Physical Activity: Inactive    Days of Exercise per Week: 0 days    Minutes of Exercise per Session: 0 min   Stress: No Stress Concern Present    Feeling of Stress : Only a little   Social Connections: Moderately Integrated    Frequency of Communication with Friends and Family: Three times a week    Frequency of Social Gatherings with Friends and Family: Three times a week    Attends Oriental orthodox Services: More than 4 times per year    Active Member of Clubs or Organizations: No    Attends Club or Organization Meetings: Never    Marital Status:    Housing Stability: Unknown    Unable to Pay for Housing in the Last Year: No    Unstable Housing in the Last Year: No       Review of patient's allergies indicates:   Allergen Reactions    Ceftin [cefuroxime axetil] Itching    Gabapentin      Felt like "no blood flow to my legs"    Metoprolol      Leg pain    [id number] antimigraine agents      Was told never to take this medication due to vascular issues - Per Neurologist     Versed [midazolam]      "wants to come out of my skin - I get hyped"       Review of Systems:  General ROS: negative for - fever  Psychological ROS: negative for - hostility  Hematological and Lymphatic ROS: negative for - bleeding problems  Endocrine ROS: negative for - unexpected weight changes  Respiratory ROS: no cough, shortness of breath, or wheezing  Cardiovascular ROS: no chest pain or dyspnea on exertion  Gastrointestinal ROS: no abdominal pain, change in bowel habits, or black or bloody stools  Musculoskeletal ROS: positive for - muscular weakness  Neurological ROS: positive for - numbness/tingling  Dermatological ROS: negative for rash    Physical Exam:  Vitals:    03/13/23 1117   BP: (!) 173/78   Pulse: 70   Weight: 97.5 kg (215 lb)   Height: 5' 7" " (1.702 m)   PainSc:   8   PainLoc: Back       Body mass index is 33.67 kg/m².     Gen: NAD  Psych: mood appropriate for given condition, emotional, frustrated with pain  CV: 2+ radial pulse  HEENT: anicteric   Respiratory: non labored  Abd: soft nt, nd  Skin: intact    Gait:  Antalgic gait  ROM: limited AROM of the L spine in all planes, full ROM at ankles, knees and hips  Lumbar flexion 90 degrees, extension 50 degrees, side bending 30 degrees.    Sensation: intact to light touch in all dermatomes tested from L2-S1 bilaterally  Reflexes: 2+ bilateral patella and Achilles  Tone: normal in the b/l knees and hips   Skin: intact  Extremities: No edema in b/l ankles or hands             Right Left   L2/3 Iliacus Hip flexion  5  5   L3/4 Qudratus Femoris Knee Extension  5  5   L4/5 Tib Anterior Ankle Dorsiflexion   5  5   L5/S1 Extensor Hallicus Longus Great toe extension  5  5                           S1/S2 Gastroc/Soleus Plantar Flexion  5  5           Imaging:  MRI cervical spine 7/15/21  FINDINGS:  Straightening of normal cervical lordosis.  Dextrocurvature.  No spondylolisthesis.  No acute fracture with preserved vertebral body heights.  No marrow replacement.  Multilevel disc desiccation.  Mild disc height loss at C4-5 through C6-7.  Cervical cord normal in signal.  Slight ventral cord flattening focally at C3-4 and C5-6.  There is a partially imaged T2 hyperintense lesion along the right C6-7 neural foramen which measures at least 5 mm.    At C2-3 there is bilateral facet degenerative changes especially on the left.  Partial effacement ventral CSF space.  Moderate left neural foraminal narrowing.  At C3-4 minimal posterior disc bulge.  Bilateral uncovertebral spurs.  Bilateral facet degenerative changes.  Complete effacement ventral and partial effacement dorsal CSF space.  Moderate right neural foraminal narrowing.  At C4-5 posterior disc osteophyte complex and bilateral uncovertebral spurs and left facet  degenerative change.  Partial effacement ventral and dorsal CSF space.  Mild right and severe left neural foraminal narrowing.  At C5-6 posterior disc osteophyte complex asymmetric to the right and bilateral uncovertebral spurs.  Complete effacement ventral CSF space and partial effacement dorsal CSF space.  At C6-7 posterior disc osteophyte complex asymmetric to the right and bilateral uncovertebral spurs.  Near complete effacement ventral CSF space.  Mild right neural foraminal narrowing.  At T3-4 and T4-5 there is right facet degenerative change, seen only on sagittal.    EMG/NCS 7/28/21  Impression: This is a slightly abnormal EMG of the left upper extremity.  There is electrophysiologic evidence most consistent with a very mild, left, C6 radiculopathy without active denervation.  There is no evidence of any other radiculopathy, focal neuropathy, peripheral neuropathy, or plexopathy on this study.  The patient's symptoms of intermittent sensory changes could be secondary to intermittent compression not resulting in demyelination or axonal nerve injury    MRI cervical spine 07/23/2022  FINDINGS:  Lateral curvature of the cervical spine convex to the right.  No fracture or destructive lesion.  Spinal cord demonstrates normal signal throughout all allowing for artifact.     C2-C3 demonstrates 1 or 2 mm anterolisthesis, moderate left-sided facet arthrosis with osteophytic hypertrophy, mild left-sided osseous foraminal narrowing.  C3-C4 demonstrates mild disc bulge, moderate right-sided facet arthrosis, mild moderate right-sided foraminal narrowing and possible nerve root contact.  C4-C5 demonstrates moderate disc space narrowing, osteophytic lipping and mild uncovertebral spurring along the posterior disc margin, desiccated disc bulge superimposed, contact of the anterior spinal cord, mild left-sided facet arthrosis, moderate left-sided foraminal narrowing with suspected nerve root contact possible  impingement.  C5-C6 demonstrates moderate disc space narrowing, severe broad-based disc bulge with anterior spinal cord contact, suspected central protrusion type herniation extending 2 mm beyond the disc plane, no significant foraminal narrowing.  C6-C7 demonstrates moderate disc space narrowing, moderate broad-based desiccated disc bulge, moderate foraminal narrowing on the right with nerve root contact.  C7-T1 demonstrates no significant disc bulge, herniation, spinal canal narrowing, foraminal narrowing.     Impression:     Multilevel disc space narrowing and disc bulging most significant at C4-C5 through C6-C7.  Suspected superimposed desiccated central protrusion type herniation C5-C6 with anterior spinal cord contact.     Multilevel foraminal narrowing, likely most significant at C4-C5 on the left.     Minimal interval change from the prior exam.    MRI thoracic spine 07/23/2022  FINDINGS:  No fracture or destructive lesion evident.  Vertebral body height is maintained.  There is lateral curvature of the upper thoracic region convex to the left, lower midthoracic region convex to the right.  Spinal cord demonstrates normal signal throughout.     Mild facet arthrosis noted at the upper thoracic levels.  Mild facet arthrosis noted along concave margin of the curvature at the mid to upper thoracic levels.  No significant foraminal narrowing evident.  Disc spaces demonstrate mild narrowing at the lower thoracic segments.  No significant disc bulge, herniation at any level.  No significant spinal canal narrowing at any level.     Paraspinal soft tissues and visualized intrathoracic structures are unremarkable.  There is a left renal cyst incidentally noted.     MRI lumbar spine 07/23/2022  FINDINGS:  No fracture or destructive lesion evident.  Distal spinal cord and conus are grossly normal.     L1-L2 demonstrates near normal disc space signal and height without significant disc bulge, herniation, spinal canal  narrowing, foraminal narrowing.  L2-L3 demonstrates mild disc space narrowing and desiccation, mild disc bulging asymmetric to the left, no significant foraminal narrowing.  L3-L4 demonstrates mild disc space narrowing asymmetric to the left, suspected broad-based left posterolateral protrusion type herniation extending 3-4 mm beyond the disc plane, left-sided lateral recess narrowing with contact of the descending left L4 nerve root, mild left-sided foraminal narrowing with extraforaminal nerve root contact.  Mild foraminal narrowing on the right.  L4-5 demonstrates mild disc space narrowing, 2 mm anterolisthesis, moderate disc bulge, moderate right-sided facet arthrosis with osteophytic hypertrophy without edema, minimal foraminal narrowing.  L5-S1 demonstrates near normal disc space signal, 1 or 2 mm anterolisthesis, mild lateral disc bulge, severe left-sided facet arthrosis with osteophytic hypertrophy without significant edema, mild left-sided lateral recess narrowing, moderate left-sided foraminal narrowing with nerve root distortion.      Labs:  BMP  Lab Results   Component Value Date     01/13/2023    K 4.6 01/13/2023     01/13/2023    CO2 29 01/13/2023    BUN 20 (H) 01/13/2023    CREATININE 0.86 01/13/2023    CALCIUM 9.5 01/13/2023    ANIONGAP 5 (L) 01/13/2023    ESTGFRAFRICA >60 04/27/2022    EGFRNONAA >60 04/27/2022     Lab Results   Component Value Date    ALT 12 01/13/2023    AST 21 01/13/2023    ALKPHOS 93 01/13/2023    BILITOT 0.4 01/13/2023       Assessment:   Problem List Items Addressed This Visit    None  Visit Diagnoses       Lumbar radiculopathy    -  Primary    Dorsalgia        Lumbar spondylosis                    68 y.o. year old female with PMH CAD, h/o CVA on plavix, HTN, presents to the office with neck pain.  She's had this pain for about the past year.  Today her pain is 6/10, constant, aching, burning, numb, radiating down both of her arms.  She reports numbness and  weakness.  Denies any bowel/bladder dysfunction    3/13/2023: Aye Montanez returns to the clinic for follow up.  Today she is reporting worsening low back pain, 8/10, starting in her left lower back and radiating down left leg into her foot.  She has known left knee issues but feels like this pain is different.  She is been taking tramadol, Tylenol, Cymbalta and a Medrol Dosepak without significant relief of her pain.  She denies any associated numbness or sugey weakness but feels like she has pain related weakness.  She denies any changes to her bowel or bladder function.  She avoids NSAIDs due to being on Plavix.      - on exam she has full strength in her lower extremities.  - I independently reviewed her lumbar MRI is consistent with L3-L4 demonstrates mild disc space narrowing asymmetric to the left, suspected broad-based left posterolateral protrusion type herniation extending 3-4 mm beyond the disc plane, left-sided lateral recess narrowing with contact of the descending left L4 nerve root,  - I believe this narrowing is contributing to her left-sided low back pain and radicular pain.  - this pain is limiting her mobility, interfering with her quality of life and her ADLs.  - she is not finding significant relief with tramadol, Tylenol, Cymbalta and a Medrol Dosepak.  - for her lumbar pain I would like to schedule her for a L3/4 SONYA with spread to the left.  - she is on Plavix due to history of multiple CVA/TIAs.  We will reach out to her neurologist Dr. Jelena Skinner for approval to hold her anticoagulation.  - follow-up 2 weeks post procedure or sooner if needed.          : Reviewed     The above note was completed, in part, with the aid of Dragon dictation software/hardware. Translation errors may be present.

## 2023-03-13 NOTE — TELEPHONE ENCOUNTER
This patient is going to be scheduled for a lumbar epidural with Dr. Oshea. She will need to hold Plavix x7 days prior. Please advise if okay to hold. Thanks!

## 2023-03-13 NOTE — PROGRESS NOTES
[facility] Follow Up Evaluation    Referred by: Dr. Skinner  Reason for referral: neck pain    CC:   Chief Complaint   Patient presents with    Leg Pain     LEFT    Low-back Pain        Last 3 PDI Scores 6/24/2022 10/8/2021   Pain Disability Index (PDI) 37 31     Interval HPI 3/13/2023: Aye Montanez returns to the clinic for follow up.  Today she is reporting worsening low back pain, 8/10, starting in her left lower back and radiating down left leg into her foot.  She has known left knee issues but feels like this pain is different.  She is been taking tramadol, Tylenol, Cymbalta and a Medrol Dosepak without significant relief of her pain.  She denies any associated numbness or sugey weakness but feels like she has pain related weakness.  She denies any changes to her bowel or bladder function.  She avoids NSAIDs due to being on Plavix.    Pain Intervention History:  - s/p cervical SONYA on 6/10/2022 with no relief of her pain.       HPI:   Aye Montanez is a 68 y.o. female who complains of neck pain    Onset: about 10 months  Progression: since onset, pain is gradually worsening  Typical Range: 6-10/10  Timing: constant  Quality: aching, numb, burning, tingling  Radiation: yes, down both arms  Associated numbness or weakness: yes numbness, yes weakness    Exacerbated by: lifting  Allievated by: rest, laying  Is Pain Level Acceptable?: No    Previous Therapies:  PT/OT:   HEP:   Interventions:   Surgery:  Medications:   - NSAIDS:   - MSK Relaxants:   - TCAs:   - SNRIs: cymbalta   - Topicals:   - Anticonvulsants:  Lyrica-did not tolerate gave her diarrhea.  - Opioids:     History:    Current Outpatient Medications:     acetaminophen (TYLENOL) 500 MG tablet, Take 1,000 mg by mouth every 6 (six) hours as needed for Pain., Disp: , Rfl:     amLODIPine (NORVASC) 2.5 MG tablet, Take 1 tablet (2.5 mg total) by mouth once daily., Disp: 30 tablet, Rfl: 3    belimumab (BENLYSTA) 200 mg/mL Syrg, Inject 1 mL (200 mg  "total) into the skin once a week., Disp: 4 mL, Rfl: 11    benzonatate (TESSALON PERLES) 100 MG capsule, Take 1 capsule (100 mg total) by mouth every 6 (six) hours as needed for Cough., Disp: 30 capsule, Rfl: 1    clopidogreL (PLAVIX) 75 mg tablet, TAKE 1 TABLET BY MOUTH ONCE DAILY WITH ASPIRIN 81MG FOR 21 DAYS.** STOP ASPIRIN AND TAKE PLAVIX ALONE THEREAFTER, Disp: 90 tablet, Rfl: 1    DULoxetine (CYMBALTA) 30 MG capsule, Take 2 capsules (60 mg total) by mouth once daily., Disp: 60 capsule, Rfl: 03    fluorometholone 0.1% (FML) 0.1 % DrpS, , Disp: , Rfl:     furosemide (LASIX) 20 MG tablet, TAKE 1 TABLET(20 MG) BY MOUTH EVERY DAY, Disp: 90 tablet, Rfl: 1    hydrocortisone 2.5 % cream, Apply topically 2 (two) times daily., Disp: 1 each, Rfl: 1    levothyroxine (SYNTHROID) 25 MCG tablet, Take 1 tablet (25 mcg total) by mouth before breakfast., Disp: 30 tablet, Rfl: 11    magnesium oxide (MAG-OX) 400 mg (241.3 mg magnesium) tablet, Take 400 mg by mouth once daily., Disp: , Rfl:     methylPREDNISolone (MEDROL DOSEPACK) 4 mg tablet, Take as directed, Disp: 21 tablet, Rfl: 0    omeprazole (PRILOSEC) 20 MG capsule, TAKE 1 CAPSULE(20 MG) BY MOUTH EVERY DAY, Disp: 90 capsule, Rfl: 1    potassium chloride SA (K-DUR,KLOR-CON) 20 MEQ tablet, TAKE 1 TABLET(20 MEQ) BY MOUTH EVERY DAY, Disp: 90 tablet, Rfl: 1    rosuvastatin (CRESTOR) 40 MG Tab, Take 1 tablet (40 mg total) by mouth once daily., Disp: 90 tablet, Rfl: 3    syringe with needle 3 mL 22 gauge x 3/4" Syrg, 6 Syringes by Misc.(Non-Drug; Combo Route) route every 30 days. Use to inject cyanocobalamin once every 30 days., Disp: , Rfl:     traMADoL (ULTRAM) 50 mg tablet, Take 1 tablet (50 mg total) by mouth every 6 (six) hours as needed for Pain., Disp: 21 tablet, Rfl: 0    vitamins A,C,E-zinc-copper (PRESERVISION AREDS) [phone] unit-mg-unit Cap, Take by mouth., Disp: , Rfl:     zinc gluconate 50 mg tablet, Take 50 mg by mouth once daily., Disp: , Rfl:     Past " Medical History:   Diagnosis Date    Sjogren's syndrome 12/05/2016    Systemic lupus erythematosus, organ or system involvement unspecified        Past Surgical History:   Procedure Laterality Date    APPENDECTOMY      CHOLECYSTECTOMY      COLONOSCOPY      COLONOSCOPY N/A 6/9/2022    Procedure: COLONOSCOPY;  Surgeon: Eugene Del Castillo MD;  Location: [facility] ENDO;  Service: Endoscopy;  Laterality: N/A;    CORONARY STENT PLACEMENT      10/2017    EPIDURAL STEROID INJECTION INTO CERVICAL SPINE N/A 6/10/2022    Procedure: Injection-steroid-epidural-cervical;  Surgeon: Dickson Oshea MD;  Location: [facility] OR;  Service: Pain Management;  Laterality: N/A;    ESOPHAGOGASTRODUODENOSCOPY N/A 6/9/2022    Procedure: EGD (ESOPHAGOGASTRODUODENOSCOPY);  Surgeon: Eugene Del Castillo MD;  Location: [facility] ENDO;  Service: Endoscopy;  Laterality: N/A;    FRACTURE SURGERY Right     foot    HYSTERECTOMY      1984    KNEE ARTHROSCOPY Right 2/2016    OOPHORECTOMY Bilateral     hyst. 1984    TOTAL REDUCTION MAMMOPLASTY Bilateral     1999    UPPER GASTROINTESTINAL ENDOSCOPY         Family History   Problem Relation Age of Onset    Cancer Mother     Ovarian cancer Mother 40    Cancer Father         esophageal    Esophageal cancer Father     Stroke Maternal Aunt     Rheum arthritis Maternal Aunt     Rheum arthritis Paternal Uncle     Breast cancer Maternal Cousin     Breast cancer Maternal Cousin     Glaucoma Neg Hx     Macular degeneration Neg Hx        Social History     Socioeconomic History    Marital status:    Tobacco Use    Smoking status: Never    Smokeless tobacco: Never   Substance and Sexual Activity    Alcohol use: Yes     Comment: rarely    Drug use: No    Sexual activity: Yes     Partners: Male     Social Determinants of Health     Financial Resource Strain: Low Risk     Difficulty of Paying Living Expenses: Not hard at all   Food Insecurity: No Food Insecurity    Worried About Running Out of Food in the Last Year: Never true  "   Ran Out of Food in the Last Year: Never true   Transportation Needs: No Transportation Needs    Lack of Transportation (Medical): No    Lack of Transportation (Non-Medical): No   Physical Activity: Inactive    Days of Exercise per Week: 0 days    Minutes of Exercise per Session: 0 min   Stress: No Stress Concern Present    Feeling of Stress : Only a little   Social Connections: Moderately Integrated    Frequency of Communication with Friends and Family: Three times a week    Frequency of Social Gatherings with Friends and Family: Three times a week    Attends Gnosticist Services: More than 4 times per year    Active Member of Clubs or Organizations: No    Attends Club or Organization Meetings: Never    Marital Status:    Housing Stability: Unknown    Unable to Pay for Housing in the Last Year: No    Unstable Housing in the Last Year: No       Review of patient's allergies indicates:   Allergen Reactions    Ceftin [cefuroxime axetil] Itching    Gabapentin      Felt like "no blood flow to my legs"    Metoprolol      Leg pain    [id number] antimigraine agents      Was told never to take this medication due to vascular issues - Per Neurologist     Versed [midazolam]      "wants to come out of my skin - I get hyped"       Review of Systems:  General ROS: negative for - fever  Psychological ROS: negative for - hostility  Hematological and Lymphatic ROS: negative for - bleeding problems  Endocrine ROS: negative for - unexpected weight changes  Respiratory ROS: no cough, shortness of breath, or wheezing  Cardiovascular ROS: no chest pain or dyspnea on exertion  Gastrointestinal ROS: no abdominal pain, change in bowel habits, or black or bloody stools  Musculoskeletal ROS: positive for - muscular weakness  Neurological ROS: positive for - numbness/tingling  Dermatological ROS: negative for rash    Physical Exam:  Vitals:    03/13/23 1117   BP: (!) 173/78   Pulse: 70   Weight: 97.5 kg (215 lb)   Height: 5' 7" " (1.702 m)   PainSc:   8   PainLoc: Back       Body mass index is 33.67 kg/m².     Gen: NAD  Psych: mood appropriate for given condition, emotional, frustrated with pain  CV: 2+ radial pulse  HEENT: anicteric   Respiratory: non labored  Abd: soft nt, nd  Skin: intact    Gait:  Antalgic gait  ROM: limited AROM of the L spine in all planes, full ROM at ankles, knees and hips  Lumbar flexion 90 degrees, extension 50 degrees, side bending 30 degrees.    Sensation: intact to light touch in all dermatomes tested from L2-S1 bilaterally  Reflexes: 2+ bilateral patella and Achilles  Tone: normal in the b/l knees and hips   Skin: intact  Extremities: No edema in b/l ankles or hands             Right Left   L2/3 Iliacus Hip flexion  5  5   L3/4 Qudratus Femoris Knee Extension  5  5   L4/5 Tib Anterior Ankle Dorsiflexion   5  5   L5/S1 Extensor Hallicus Longus Great toe extension  5  5                           S1/S2 Gastroc/Soleus Plantar Flexion  5  5           Imaging:  MRI cervical spine 7/15/21  FINDINGS:  Straightening of normal cervical lordosis.  Dextrocurvature.  No spondylolisthesis.  No acute fracture with preserved vertebral body heights.  No marrow replacement.  Multilevel disc desiccation.  Mild disc height loss at C4-5 through C6-7.  Cervical cord normal in signal.  Slight ventral cord flattening focally at C3-4 and C5-6.  There is a partially imaged T2 hyperintense lesion along the right C6-7 neural foramen which measures at least 5 mm.    At C2-3 there is bilateral facet degenerative changes especially on the left.  Partial effacement ventral CSF space.  Moderate left neural foraminal narrowing.  At C3-4 minimal posterior disc bulge.  Bilateral uncovertebral spurs.  Bilateral facet degenerative changes.  Complete effacement ventral and partial effacement dorsal CSF space.  Moderate right neural foraminal narrowing.  At C4-5 posterior disc osteophyte complex and bilateral uncovertebral spurs and left facet  degenerative change.  Partial effacement ventral and dorsal CSF space.  Mild right and severe left neural foraminal narrowing.  At C5-6 posterior disc osteophyte complex asymmetric to the right and bilateral uncovertebral spurs.  Complete effacement ventral CSF space and partial effacement dorsal CSF space.  At C6-7 posterior disc osteophyte complex asymmetric to the right and bilateral uncovertebral spurs.  Near complete effacement ventral CSF space.  Mild right neural foraminal narrowing.  At T3-4 and T4-5 there is right facet degenerative change, seen only on sagittal.    EMG/NCS 7/28/21  Impression: This is a slightly abnormal EMG of the left upper extremity.  There is electrophysiologic evidence most consistent with a very mild, left, C6 radiculopathy without active denervation.  There is no evidence of any other radiculopathy, focal neuropathy, peripheral neuropathy, or plexopathy on this study.  The patient's symptoms of intermittent sensory changes could be secondary to intermittent compression not resulting in demyelination or axonal nerve injury    MRI cervical spine 07/23/2022  FINDINGS:  Lateral curvature of the cervical spine convex to the right.  No fracture or destructive lesion.  Spinal cord demonstrates normal signal throughout all allowing for artifact.     C2-C3 demonstrates 1 or 2 mm anterolisthesis, moderate left-sided facet arthrosis with osteophytic hypertrophy, mild left-sided osseous foraminal narrowing.  C3-C4 demonstrates mild disc bulge, moderate right-sided facet arthrosis, mild moderate right-sided foraminal narrowing and possible nerve root contact.  C4-C5 demonstrates moderate disc space narrowing, osteophytic lipping and mild uncovertebral spurring along the posterior disc margin, desiccated disc bulge superimposed, contact of the anterior spinal cord, mild left-sided facet arthrosis, moderate left-sided foraminal narrowing with suspected nerve root contact possible  impingement.  C5-C6 demonstrates moderate disc space narrowing, severe broad-based disc bulge with anterior spinal cord contact, suspected central protrusion type herniation extending 2 mm beyond the disc plane, no significant foraminal narrowing.  C6-C7 demonstrates moderate disc space narrowing, moderate broad-based desiccated disc bulge, moderate foraminal narrowing on the right with nerve root contact.  C7-T1 demonstrates no significant disc bulge, herniation, spinal canal narrowing, foraminal narrowing.     Impression:     Multilevel disc space narrowing and disc bulging most significant at C4-C5 through C6-C7.  Suspected superimposed desiccated central protrusion type herniation C5-C6 with anterior spinal cord contact.     Multilevel foraminal narrowing, likely most significant at C4-C5 on the left.     Minimal interval change from the prior exam.    MRI thoracic spine 07/23/2022  FINDINGS:  No fracture or destructive lesion evident.  Vertebral body height is maintained.  There is lateral curvature of the upper thoracic region convex to the left, lower midthoracic region convex to the right.  Spinal cord demonstrates normal signal throughout.     Mild facet arthrosis noted at the upper thoracic levels.  Mild facet arthrosis noted along concave margin of the curvature at the mid to upper thoracic levels.  No significant foraminal narrowing evident.  Disc spaces demonstrate mild narrowing at the lower thoracic segments.  No significant disc bulge, herniation at any level.  No significant spinal canal narrowing at any level.     Paraspinal soft tissues and visualized intrathoracic structures are unremarkable.  There is a left renal cyst incidentally noted.     MRI lumbar spine 07/23/2022  FINDINGS:  No fracture or destructive lesion evident.  Distal spinal cord and conus are grossly normal.     L1-L2 demonstrates near normal disc space signal and height without significant disc bulge, herniation, spinal canal  narrowing, foraminal narrowing.  L2-L3 demonstrates mild disc space narrowing and desiccation, mild disc bulging asymmetric to the left, no significant foraminal narrowing.  L3-L4 demonstrates mild disc space narrowing asymmetric to the left, suspected broad-based left posterolateral protrusion type herniation extending 3-4 mm beyond the disc plane, left-sided lateral recess narrowing with contact of the descending left L4 nerve root, mild left-sided foraminal narrowing with extraforaminal nerve root contact.  Mild foraminal narrowing on the right.  L4-5 demonstrates mild disc space narrowing, 2 mm anterolisthesis, moderate disc bulge, moderate right-sided facet arthrosis with osteophytic hypertrophy without edema, minimal foraminal narrowing.  L5-S1 demonstrates near normal disc space signal, 1 or 2 mm anterolisthesis, mild lateral disc bulge, severe left-sided facet arthrosis with osteophytic hypertrophy without significant edema, mild left-sided lateral recess narrowing, moderate left-sided foraminal narrowing with nerve root distortion.      Labs:  BMP  Lab Results   Component Value Date     01/13/2023    K 4.6 01/13/2023     01/13/2023    CO2 29 01/13/2023    BUN 20 (H) 01/13/2023    CREATININE 0.86 01/13/2023    CALCIUM 9.5 01/13/2023    ANIONGAP 5 (L) 01/13/2023    ESTGFRAFRICA >60 04/27/2022    EGFRNONAA >60 04/27/2022     Lab Results   Component Value Date    ALT 12 01/13/2023    AST 21 01/13/2023    ALKPHOS 93 01/13/2023    BILITOT 0.4 01/13/2023       Assessment:   Problem List Items Addressed This Visit    None  Visit Diagnoses       Lumbar radiculopathy    -  Primary    Dorsalgia        Lumbar spondylosis                    68 y.o. year old female with PMH CAD, h/o CVA on plavix, HTN, presents to the office with neck pain.  She's had this pain for about the past year.  Today her pain is 6/10, constant, aching, burning, numb, radiating down both of her arms.  She reports numbness and  weakness.  Denies any bowel/bladder dysfunction    3/13/2023: Aye Montanez returns to the clinic for follow up.  Today she is reporting worsening low back pain, 8/10, starting in her left lower back and radiating down left leg into her foot.  She has known left knee issues but feels like this pain is different.  She is been taking tramadol, Tylenol, Cymbalta and a Medrol Dosepak without significant relief of her pain.  She denies any associated numbness or sugey weakness but feels like she has pain related weakness.  She denies any changes to her bowel or bladder function.  She avoids NSAIDs due to being on Plavix.      - on exam she has full strength in her lower extremities.  - I independently reviewed her lumbar MRI is consistent with L3-L4 demonstrates mild disc space narrowing asymmetric to the left, suspected broad-based left posterolateral protrusion type herniation extending 3-4 mm beyond the disc plane, left-sided lateral recess narrowing with contact of the descending left L4 nerve root,  - I believe this narrowing is contributing to her left-sided low back pain and radicular pain.  - this pain is limiting her mobility, interfering with her quality of life and her ADLs.  - she is not finding significant relief with tramadol, Tylenol, Cymbalta and a Medrol Dosepak.  - for her lumbar pain I would like to schedule her for a L3/4 SONYA with spread to the left.  - she is on Plavix due to history of multiple CVA/TIAs.  We will reach out to her neurologist Dr. Jelena Skinner for approval to hold her anticoagulation.  - follow-up 2 weeks post procedure or sooner if needed.          : Reviewed     The above note was completed, in part, with the aid of Dragon dictation software/hardware. Translation errors may be present.

## 2023-03-15 ENCOUNTER — TELEPHONE (OUTPATIENT)
Dept: PAIN MEDICINE | Facility: CLINIC | Age: 68
End: 2023-03-15
Payer: MEDICARE

## 2023-03-15 ENCOUNTER — OFFICE VISIT (OUTPATIENT)
Dept: ORTHOPEDICS | Facility: CLINIC | Age: 68
End: 2023-03-15
Payer: MEDICARE

## 2023-03-15 VITALS — BODY MASS INDEX: 33.74 KG/M2 | HEIGHT: 67 IN | WEIGHT: 215 LBS

## 2023-03-15 DIAGNOSIS — M17.12 PRIMARY OSTEOARTHRITIS OF LEFT KNEE: ICD-10-CM

## 2023-03-15 DIAGNOSIS — M79.669 LOWER LEG PAIN: Primary | ICD-10-CM

## 2023-03-15 PROCEDURE — 1125F PR PAIN SEVERITY QUANTIFIED, PAIN PRESENT: ICD-10-PCS | Mod: CPTII,S$GLB,, | Performed by: ORTHOPAEDIC SURGERY

## 2023-03-15 PROCEDURE — 3008F BODY MASS INDEX DOCD: CPT | Mod: CPTII,S$GLB,, | Performed by: ORTHOPAEDIC SURGERY

## 2023-03-15 PROCEDURE — 1159F PR MEDICATION LIST DOCUMENTED IN MEDICAL RECORD: ICD-10-PCS | Mod: CPTII,S$GLB,, | Performed by: ORTHOPAEDIC SURGERY

## 2023-03-15 PROCEDURE — 99999 PR PBB SHADOW E&M-EST. PATIENT-LVL III: CPT | Mod: PBBFAC,,, | Performed by: ORTHOPAEDIC SURGERY

## 2023-03-15 PROCEDURE — 1160F RVW MEDS BY RX/DR IN RCRD: CPT | Mod: CPTII,S$GLB,, | Performed by: ORTHOPAEDIC SURGERY

## 2023-03-15 PROCEDURE — 99999 PR PBB SHADOW E&M-EST. PATIENT-LVL III: ICD-10-PCS | Mod: PBBFAC,,, | Performed by: ORTHOPAEDIC SURGERY

## 2023-03-15 PROCEDURE — 1101F PT FALLS ASSESS-DOCD LE1/YR: CPT | Mod: CPTII,S$GLB,, | Performed by: ORTHOPAEDIC SURGERY

## 2023-03-15 PROCEDURE — 99213 OFFICE O/P EST LOW 20 MIN: CPT | Mod: S$GLB,,, | Performed by: ORTHOPAEDIC SURGERY

## 2023-03-15 PROCEDURE — 1125F AMNT PAIN NOTED PAIN PRSNT: CPT | Mod: CPTII,S$GLB,, | Performed by: ORTHOPAEDIC SURGERY

## 2023-03-15 PROCEDURE — 1160F PR REVIEW ALL MEDS BY PRESCRIBER/CLIN PHARMACIST DOCUMENTED: ICD-10-PCS | Mod: CPTII,S$GLB,, | Performed by: ORTHOPAEDIC SURGERY

## 2023-03-15 PROCEDURE — 99213 PR OFFICE/OUTPT VISIT, EST, LEVL III, 20-29 MIN: ICD-10-PCS | Mod: S$GLB,,, | Performed by: ORTHOPAEDIC SURGERY

## 2023-03-15 PROCEDURE — 3288F FALL RISK ASSESSMENT DOCD: CPT | Mod: CPTII,S$GLB,, | Performed by: ORTHOPAEDIC SURGERY

## 2023-03-15 PROCEDURE — 3008F PR BODY MASS INDEX (BMI) DOCUMENTED: ICD-10-PCS | Mod: CPTII,S$GLB,, | Performed by: ORTHOPAEDIC SURGERY

## 2023-03-15 PROCEDURE — 1101F PR PT FALLS ASSESS DOC 0-1 FALLS W/OUT INJ PAST YR: ICD-10-PCS | Mod: CPTII,S$GLB,, | Performed by: ORTHOPAEDIC SURGERY

## 2023-03-15 PROCEDURE — 3288F PR FALLS RISK ASSESSMENT DOCUMENTED: ICD-10-PCS | Mod: CPTII,S$GLB,, | Performed by: ORTHOPAEDIC SURGERY

## 2023-03-15 PROCEDURE — 1159F MED LIST DOCD IN RCRD: CPT | Mod: CPTII,S$GLB,, | Performed by: ORTHOPAEDIC SURGERY

## 2023-03-15 NOTE — TELEPHONE ENCOUNTER
This patient is scheduled with Dr. Oshea on 3/21. She will need to hold Plavix x7 days prior to procedure. Please advise if okay to hold. Thanks!

## 2023-03-15 NOTE — PROGRESS NOTES
68 years old follow-up of her leg pain which was 10/10.  States she is feeling a little bit better.      Ultrasound was negative for DVT, MRI of the leg was negative for any worrisome lesions     No arthritic changes of the knee    Plan:  Patient is scheduled for lumbar injections which hopefully will alleviate her leg pain, will follow up in about a month for injection into her arthritic knee

## 2023-03-15 NOTE — TELEPHONE ENCOUNTER
I do not think she needs a follow-up with Neurology.    I think we just need to get clearance from whoever is prescribing the Plavix that she can hold it for 5 days prior to her injection.  I think Dr. Christine her PCP is prescribing it

## 2023-03-16 ENCOUNTER — PATIENT MESSAGE (OUTPATIENT)
Dept: RHEUMATOLOGY | Facility: CLINIC | Age: 68
End: 2023-03-16
Payer: MEDICARE

## 2023-03-21 ENCOUNTER — HOSPITAL ENCOUNTER (OUTPATIENT)
Dept: RADIOLOGY | Facility: HOSPITAL | Age: 68
Discharge: HOME | End: 2023-03-21
Attending: ANESTHESIOLOGY | Admitting: ANESTHESIOLOGY
Payer: MEDICARE

## 2023-03-21 ENCOUNTER — HOSPITAL ENCOUNTER (OUTPATIENT)
Facility: HOSPITAL | Age: 68
Discharge: HOME | End: 2023-03-21
Attending: ANESTHESIOLOGY | Admitting: ANESTHESIOLOGY
Payer: MEDICARE

## 2023-03-21 DIAGNOSIS — M54.50 LOWER BACK PAIN: ICD-10-CM

## 2023-03-21 DIAGNOSIS — M54.16 LUMBAR RADICULOPATHY: Primary | ICD-10-CM

## 2023-03-21 PROCEDURE — 63600175 PHARM REV CODE 636 W HCPCS: Mod: PO | Performed by: ANESTHESIOLOGY

## 2023-03-21 PROCEDURE — 62323 NJX INTERLAMINAR LMBR/SAC: CPT | Mod: ,,, | Performed by: ANESTHESIOLOGY

## 2023-03-21 PROCEDURE — 62323 PR INJ LUMBAR/SACRAL, W/IMAGING GUIDANCE: ICD-10-PCS | Mod: ,,, | Performed by: ANESTHESIOLOGY

## 2023-03-21 PROCEDURE — 25500020 PHARM REV CODE 255: Mod: PO | Performed by: ANESTHESIOLOGY

## 2023-03-21 PROCEDURE — 62323 NJX INTERLAMINAR LMBR/SAC: CPT | Mod: PO | Performed by: ANESTHESIOLOGY

## 2023-03-21 PROCEDURE — 76000 FLUOROSCOPY <1 HR PHYS/QHP: CPT | Mod: TC,PO

## 2023-03-21 PROCEDURE — 25000003 PHARM REV CODE 250: Mod: PO | Performed by: ANESTHESIOLOGY

## 2023-03-21 RX ORDER — METHYLPREDNISOLONE ACETATE 80 MG/ML
INJECTION, SUSPENSION INTRA-ARTICULAR; INTRALESIONAL; INTRAMUSCULAR; SOFT TISSUE
Status: DISCONTINUED | OUTPATIENT
Start: 2023-03-21 | End: 2023-03-21 | Stop reason: HOSPADM

## 2023-03-21 RX ORDER — LIDOCAINE HYDROCHLORIDE 10 MG/ML
INJECTION, SOLUTION EPIDURAL; INFILTRATION; INTRACAUDAL; PERINEURAL
Status: DISCONTINUED | OUTPATIENT
Start: 2023-03-21 | End: 2023-03-21 | Stop reason: HOSPADM

## 2023-03-21 NOTE — OP NOTE
"Procedure Note    Procedure Date: 3/21/2023    Procedure Performed:  L3/4 lumbar interlaminar epidural steroid injection under fluoroscopy.    Indications: Patient has failed conservative therapy.      Pre-op diagnosis: Lumbar Radiculopathy    Post-op diagnosis: same    Physician: Dickson Oshea MD    IV anxiolysis medications: none    Medications injected: depomedrol 80mg, 1% Lidocaine 1ml, 2 mL sterile, preservative-free normal saline.    Local anesthetic used: 1% Lidocaine, 1 ml    Estimated Blood Loss: none    Complications:  none    Technique:  The patient was interviewed in the holding area and Risks/Benefits were discussed, including, but not limited to, the possibility of new or different pain, bleeding or infection.   All questions were answered.  The patient understood and accepted risks.  Consent was verfied.  A time-out was taken to identify patient and procedure prior to starting the procedure. The patient was placed in the prone position on the fluoroscopy table. The area of the lumbar spine was prepped with Chloraprep x2 and draped in a sterile manner. The L3/4 interspace was identified and marked under AP fluoroscopy. The skin and subcutaneous tissues overlying the targeted interspace were anesthetized with 3-5 mL of 1% lidocaine using a 25G, 1.5" needle.  A 20G, 3.5" Tuohy epidural needle was directed toward the interspace under fluoroscopic guidance until the ligamentum flavum was engaged. From this point, a loss of resistance technique with a glass syringe and saline was used to identify entrance of the needle into the epidural space. Once loss of resistance was observed 1 mL of contrast solution was injected. An appropriate epidurogram was noted.  A 4 mL mixture consisting of saline, 1 mL 1% Lidocaine and 80 mg of depomedrol was injected slowly and without resistance.  The needle was flushed with normal saline and removed. The contrast was seen to be displaced after injection. Patient was " awake/responsive during all injections.  The patient tolerated the procedure well and was transferred to the .AC.. in stable condition.  The patient was monitored after the procedure and was given post-procedure and discharge instructions to follow at home. The patient was discharged in a stable condition.

## 2023-03-21 NOTE — INTERVAL H&P NOTE
The patient has been examined and the H&P has been reviewed:    I concur with the findings and no changes have occurred since H&P was written.  The risks and benefits of this intervention, and alternative therapies were discussed with the patient.  The discussion of risks included infection, bleeding, need for additional procedures or surgery, nerve damage.  Questions regarding the procedure, risks, expected outcome, and possible side effects were solicited and answered to the patient's satisfaction.  Aye Montanez wishes to proceed with the injection or procedure.  Written consent was obtained.        There are no hospital problems to display for this patient.

## 2023-03-21 NOTE — DISCHARGE SUMMARY
[facility]  Discharge Note  Short Stay    Admit Date: 3/21/2023    Discharge Date: 3/21/2023    Attending Physician: Dickson Oshea     Discharge Provider: Dickson Oshea    Diagnoses:  There are no hospital problems to display for this patient.      Discharged Condition: Good    Final Diagnoses: Lumbar radiculopathy [M54.16]    Disposition: Home or Self Care    Hospital Course: No complications, uneventful    Outcome of Hospitalization, Treatment, Procedure, or Surgery:  Patient was admitted for outpatient interventional pain management procedure. The patient tolerated the procedure well with no complications.    Follow up/Patient Instructions:  Follow up as scheduled in Pain Management office in 2-3 weeks.  Patient has received instructions and follow up date and time.    Medications:  Continue previous medications, except restart plavix in 24 hours    Discharge Procedure Orders   Notify your health care provider if you experience any of the following:  temperature >100.4     Notify your health care provider if you experience any of the following:  persistent nausea and vomiting or diarrhea     Notify your health care provider if you experience any of the following:  severe uncontrolled pain     Notify your health care provider if you experience any of the following:  redness, tenderness, or signs of infection (pain, swelling, redness, odor or green/yellow discharge around incision site)     Notify your health care provider if you experience any of the following:  difficulty breathing or increased cough     Notify your health care provider if you experience any of the following:  severe persistent headache     Notify your health care provider if you experience any of the following:  worsening rash     Notify your health care provider if you experience any of the following:  persistent dizziness, light-headedness, or visual disturbances     Notify your health care provider if you experience any of the  following:  increased confusion or weakness     Activity as tolerated

## 2023-03-22 VITALS
HEIGHT: 67 IN | DIASTOLIC BLOOD PRESSURE: 68 MMHG | TEMPERATURE: 98 F | HEART RATE: 61 BPM | BODY MASS INDEX: 33.74 KG/M2 | WEIGHT: 215 LBS | OXYGEN SATURATION: 99 % | RESPIRATION RATE: 18 BRPM | SYSTOLIC BLOOD PRESSURE: 136 MMHG

## 2023-03-31 ENCOUNTER — OFFICE VISIT (OUTPATIENT)
Dept: HOME HEALTH SERVICES | Facility: CLINIC | Age: 68
End: 2023-03-31
Payer: MEDICARE

## 2023-03-31 VITALS
DIASTOLIC BLOOD PRESSURE: 77 MMHG | SYSTOLIC BLOOD PRESSURE: 125 MMHG | WEIGHT: 215 LBS | HEART RATE: 63 BPM | BODY MASS INDEX: 33.74 KG/M2 | HEIGHT: 67 IN

## 2023-03-31 DIAGNOSIS — M35.00 SJOGREN'S SYNDROME WITHOUT EXTRAGLANDULAR INVOLVEMENT: ICD-10-CM

## 2023-03-31 DIAGNOSIS — I70.0 ATHEROSCLEROSIS OF AORTA: ICD-10-CM

## 2023-03-31 DIAGNOSIS — M54.12 CERVICAL RADICULOPATHY: ICD-10-CM

## 2023-03-31 DIAGNOSIS — K21.9 GASTROESOPHAGEAL REFLUX DISEASE WITHOUT ESOPHAGITIS: ICD-10-CM

## 2023-03-31 DIAGNOSIS — M32.13 OTHER SYSTEMIC LUPUS ERYTHEMATOSUS WITH LUNG INVOLVEMENT: ICD-10-CM

## 2023-03-31 DIAGNOSIS — E78.00 HYPERCHOLESTEROLEMIA: ICD-10-CM

## 2023-03-31 DIAGNOSIS — M81.0 AGE-RELATED OSTEOPOROSIS WITHOUT CURRENT PATHOLOGICAL FRACTURE: ICD-10-CM

## 2023-03-31 DIAGNOSIS — Z86.73 HISTORY OF CVA (CEREBROVASCULAR ACCIDENT): ICD-10-CM

## 2023-03-31 DIAGNOSIS — E66.09 CLASS 1 OBESITY DUE TO EXCESS CALORIES WITH SERIOUS COMORBIDITY AND BODY MASS INDEX (BMI) OF 33.0 TO 33.9 IN ADULT: ICD-10-CM

## 2023-03-31 DIAGNOSIS — I25.10 CORONARY ARTERY DISEASE DUE TO CALCIFIED CORONARY LESION: ICD-10-CM

## 2023-03-31 DIAGNOSIS — E03.9 ACQUIRED HYPOTHYROIDISM: ICD-10-CM

## 2023-03-31 DIAGNOSIS — F32.0 CURRENT MILD EPISODE OF MAJOR DEPRESSIVE DISORDER WITHOUT PRIOR EPISODE: ICD-10-CM

## 2023-03-31 DIAGNOSIS — Z00.00 ENCOUNTER FOR PREVENTIVE HEALTH EXAMINATION: Primary | ICD-10-CM

## 2023-03-31 DIAGNOSIS — H35.3131 EARLY DRY STAGE NONEXUDATIVE AGE-RELATED MACULAR DEGENERATION OF BOTH EYES: ICD-10-CM

## 2023-03-31 DIAGNOSIS — I25.84 CORONARY ARTERY DISEASE DUE TO CALCIFIED CORONARY LESION: ICD-10-CM

## 2023-03-31 DIAGNOSIS — I73.00 RAYNAUD'S DISEASE WITHOUT GANGRENE: ICD-10-CM

## 2023-03-31 PROCEDURE — 3288F FALL RISK ASSESSMENT DOCD: CPT | Mod: CPTII,S$GLB,, | Performed by: NURSE PRACTITIONER

## 2023-03-31 PROCEDURE — G0439 PR MEDICARE ANNUAL WELLNESS SUBSEQUENT VISIT: ICD-10-PCS | Mod: S$GLB,,, | Performed by: NURSE PRACTITIONER

## 2023-03-31 PROCEDURE — 1159F PR MEDICATION LIST DOCUMENTED IN MEDICAL RECORD: ICD-10-PCS | Mod: CPTII,S$GLB,, | Performed by: NURSE PRACTITIONER

## 2023-03-31 PROCEDURE — 1101F PR PT FALLS ASSESS DOC 0-1 FALLS W/OUT INJ PAST YR: ICD-10-PCS | Mod: CPTII,S$GLB,, | Performed by: NURSE PRACTITIONER

## 2023-03-31 PROCEDURE — 3078F PR MOST RECENT DIASTOLIC BLOOD PRESSURE < 80 MM HG: ICD-10-PCS | Mod: CPTII,S$GLB,, | Performed by: NURSE PRACTITIONER

## 2023-03-31 PROCEDURE — 3074F SYST BP LT 130 MM HG: CPT | Mod: CPTII,S$GLB,, | Performed by: NURSE PRACTITIONER

## 2023-03-31 PROCEDURE — 1101F PT FALLS ASSESS-DOCD LE1/YR: CPT | Mod: CPTII,S$GLB,, | Performed by: NURSE PRACTITIONER

## 2023-03-31 PROCEDURE — 1160F RVW MEDS BY RX/DR IN RCRD: CPT | Mod: CPTII,S$GLB,, | Performed by: NURSE PRACTITIONER

## 2023-03-31 PROCEDURE — 1159F MED LIST DOCD IN RCRD: CPT | Mod: CPTII,S$GLB,, | Performed by: NURSE PRACTITIONER

## 2023-03-31 PROCEDURE — 3008F PR BODY MASS INDEX (BMI) DOCUMENTED: ICD-10-PCS | Mod: CPTII,S$GLB,, | Performed by: NURSE PRACTITIONER

## 2023-03-31 PROCEDURE — 3078F DIAST BP <80 MM HG: CPT | Mod: CPTII,S$GLB,, | Performed by: NURSE PRACTITIONER

## 2023-03-31 PROCEDURE — 3288F PR FALLS RISK ASSESSMENT DOCUMENTED: ICD-10-PCS | Mod: CPTII,S$GLB,, | Performed by: NURSE PRACTITIONER

## 2023-03-31 PROCEDURE — 1160F PR REVIEW ALL MEDS BY PRESCRIBER/CLIN PHARMACIST DOCUMENTED: ICD-10-PCS | Mod: CPTII,S$GLB,, | Performed by: NURSE PRACTITIONER

## 2023-03-31 PROCEDURE — 3074F PR MOST RECENT SYSTOLIC BLOOD PRESSURE < 130 MM HG: ICD-10-PCS | Mod: CPTII,S$GLB,, | Performed by: NURSE PRACTITIONER

## 2023-03-31 PROCEDURE — G0439 PPPS, SUBSEQ VISIT: HCPCS | Mod: S$GLB,,, | Performed by: NURSE PRACTITIONER

## 2023-03-31 PROCEDURE — 3008F BODY MASS INDEX DOCD: CPT | Mod: CPTII,S$GLB,, | Performed by: NURSE PRACTITIONER

## 2023-04-02 ENCOUNTER — TELEPHONE (OUTPATIENT)
Dept: HOME HEALTH SERVICES | Facility: CLINIC | Age: 68
End: 2023-04-02
Payer: MEDICARE

## 2023-04-02 DIAGNOSIS — M81.0 OSTEOPOROSIS, UNSPECIFIED OSTEOPOROSIS TYPE, UNSPECIFIED PATHOLOGICAL FRACTURE PRESENCE: Primary | ICD-10-CM

## 2023-04-02 NOTE — PATIENT INSTRUCTIONS
Counseling and Referral of Other Preventative  (Italic type indicates deductible and co-insurance are waived)    Patient Name: Aye Montanez  Today's Date: 4/2/2023    Health Maintenance       Date Due Completion Date    COVID-19 Vaccine (3 - Booster for Pfizer series) 05/13/2021 3/18/2021    Influenza Vaccine (1) 09/01/2022 9/29/2021    TETANUS VACCINE 09/12/2022 9/12/2012 (Done)    Override on 9/12/2012: Done    Hemoglobin A1c (Prediabetes) 10/14/2022 10/14/2021    Mammogram 09/14/2023 9/14/2021    High Dose Statin 03/31/2024 3/31/2023    Aspirin/Antiplatelet Therapy 03/31/2024 3/31/2023    DEXA Scan 09/14/2024 9/14/2021    Colorectal Cancer Screening 06/09/2027 6/9/2022    Override on 5/12/2016: Done    Lipid Panel 09/09/2027 9/9/2022        No orders of the defined types were placed in this encounter.    The following information is provided to all patients.  This information is to help you find resources for any of the problems found today that may be affecting your health:                Living healthy guide: www.[facility].[place].gov      Understanding Diabetes: www.diabetes.org      Eating healthy: www.cdc.gov/healthyweight      CDC home safety checklist: www.cdc.gov/steadi/patient.html      Agency on Aging: www.goea.[place].[facility]      Alcoholics anonymous (AA): www.aa.org      Physical Activity: www.kathleen.nih.gov/rf4sxzm      Tobacco use: www.quitwithusla.org

## 2023-04-02 NOTE — TELEPHONE ENCOUNTER
Reviewed patient's dexa scan with her and she is agreeable to taking Biphosphonate for the osteoporosis that is in the dexa.  Will you please order?  I have added Vit D to her labs

## 2023-04-03 ENCOUNTER — PATIENT MESSAGE (OUTPATIENT)
Dept: FAMILY MEDICINE | Facility: CLINIC | Age: 68
End: 2023-04-03
Payer: MEDICARE

## 2023-04-03 RX ORDER — ALENDRONATE SODIUM 70 MG/1
70 TABLET ORAL
Qty: 4 TABLET | Refills: 11 | Status: SHIPPED | OUTPATIENT
Start: 2023-04-03 | End: 2024-04-02

## 2023-04-04 ENCOUNTER — OFFICE VISIT (OUTPATIENT)
Dept: PAIN MEDICINE | Facility: CLINIC | Age: 68
End: 2023-04-04
Payer: MEDICARE

## 2023-04-04 VITALS — HEART RATE: 65 BPM | SYSTOLIC BLOOD PRESSURE: 145 MMHG | DIASTOLIC BLOOD PRESSURE: 69 MMHG

## 2023-04-04 DIAGNOSIS — M54.12 CERVICAL RADICULOPATHY: ICD-10-CM

## 2023-04-04 DIAGNOSIS — M54.16 LUMBAR RADICULOPATHY: Primary | ICD-10-CM

## 2023-04-04 DIAGNOSIS — M54.9 DORSALGIA: ICD-10-CM

## 2023-04-04 PROBLEM — M81.0 AGE-RELATED OSTEOPOROSIS WITHOUT CURRENT PATHOLOGICAL FRACTURE: Status: ACTIVE | Noted: 2023-04-04

## 2023-04-04 PROBLEM — M85.851 OSTEOPENIA OF RIGHT THIGH: Status: RESOLVED | Noted: 2018-04-11 | Resolved: 2023-04-04

## 2023-04-04 PROCEDURE — 1101F PR PT FALLS ASSESS DOC 0-1 FALLS W/OUT INJ PAST YR: ICD-10-PCS | Mod: CPTII,S$GLB,,

## 2023-04-04 PROCEDURE — 3078F PR MOST RECENT DIASTOLIC BLOOD PRESSURE < 80 MM HG: ICD-10-PCS | Mod: CPTII,S$GLB,,

## 2023-04-04 PROCEDURE — 1126F AMNT PAIN NOTED NONE PRSNT: CPT | Mod: CPTII,S$GLB,,

## 2023-04-04 PROCEDURE — 3077F SYST BP >= 140 MM HG: CPT | Mod: CPTII,S$GLB,,

## 2023-04-04 PROCEDURE — 1101F PT FALLS ASSESS-DOCD LE1/YR: CPT | Mod: CPTII,S$GLB,,

## 2023-04-04 PROCEDURE — 3288F PR FALLS RISK ASSESSMENT DOCUMENTED: ICD-10-PCS | Mod: CPTII,S$GLB,,

## 2023-04-04 PROCEDURE — 3288F FALL RISK ASSESSMENT DOCD: CPT | Mod: CPTII,S$GLB,,

## 2023-04-04 PROCEDURE — 3077F PR MOST RECENT SYSTOLIC BLOOD PRESSURE >= 140 MM HG: ICD-10-PCS | Mod: CPTII,S$GLB,,

## 2023-04-04 PROCEDURE — 3078F DIAST BP <80 MM HG: CPT | Mod: CPTII,S$GLB,,

## 2023-04-04 PROCEDURE — 99999 PR PBB SHADOW E&M-EST. PATIENT-LVL II: CPT | Mod: PBBFAC,,,

## 2023-04-04 PROCEDURE — 99213 OFFICE O/P EST LOW 20 MIN: CPT | Mod: S$GLB,,,

## 2023-04-04 PROCEDURE — 99213 PR OFFICE/OUTPT VISIT, EST, LEVL III, 20-29 MIN: ICD-10-PCS | Mod: S$GLB,,,

## 2023-04-04 PROCEDURE — 99999 PR PBB SHADOW E&M-EST. PATIENT-LVL II: ICD-10-PCS | Mod: PBBFAC,,,

## 2023-04-04 PROCEDURE — 1126F PR PAIN SEVERITY QUANTIFIED, NO PAIN PRESENT: ICD-10-PCS | Mod: CPTII,S$GLB,,

## 2023-04-04 NOTE — PROGRESS NOTES
[facility] Follow Up Evaluation    Referred by: Dr. Skinner  Reason for referral: neck pain    CC:   Chief Complaint   Patient presents with    Follow-up        Last 3 PDI Scores 6/24/2022 10/8/2021   Pain Disability Index (PDI) 37 31     Interval HPI 4/4/2023: Aye Montanez returns to the office for follow up.  She is s/p L3/4 SONYA with spread to the left on 03/21/2023 with 100% relief of her previous low back pain.  Overall she is very satisfied with the relief and rates her remaining low back pain as a 0/10.  She denies any new numbness in her lower extremities, weakness or any new changes to her bowel bladder function.     Pain Intervention History:  - s/p cervical SONYA on 6/10/2022 with no relief of her pain.   - s/p L3/4 SONYA with spread to the left on 03/21/2023 with 100% relief      HPI:   Aye Montanez is a 68 y.o. female who complains of neck pain    Onset: about 10 months  Progression: since onset, pain is gradually worsening  Typical Range: 6-10/10  Timing: constant  Quality: aching, numb, burning, tingling  Radiation: yes, down both arms  Associated numbness or weakness: yes numbness, yes weakness    Exacerbated by: lifting  Allievated by: rest, laying  Is Pain Level Acceptable?: No    Previous Therapies:  PT/OT:   HEP:   Interventions:   Surgery:  Medications:   - NSAIDS:   - MSK Relaxants:   - TCAs:   - SNRIs: cymbalta   - Topicals:   - Anticonvulsants:  Lyrica-did not tolerate gave her diarrhea.  - Opioids:     History:    Current Outpatient Medications:     acetaminophen (TYLENOL) 500 MG tablet, Take 1,000 mg by mouth every 6 (six) hours as needed for Pain., Disp: , Rfl:     alendronate (FOSAMAX) 70 MG tablet, Take 1 tablet (70 mg total) by mouth every 7 days., Disp: 4 tablet, Rfl: 11    amLODIPine (NORVASC) 2.5 MG tablet, Take 1 tablet (2.5 mg total) by mouth once daily. (Patient not taking: Reported on 3/31/2023), Disp: 30 tablet, Rfl: 3    benzonatate (TESSALON PERLES) 100 MG capsule,  "Take 1 capsule (100 mg total) by mouth every 6 (six) hours as needed for Cough., Disp: 30 capsule, Rfl: 1    clopidogreL (PLAVIX) 75 mg tablet, TAKE 1 TABLET BY MOUTH ONCE DAILY WITH ASPIRIN 81MG FOR 21 DAYS.** STOP ASPIRIN AND TAKE PLAVIX ALONE THEREAFTER, Disp: 90 tablet, Rfl: 1    DULoxetine (CYMBALTA) 30 MG capsule, Take 2 capsules (60 mg total) by mouth once daily., Disp: 60 capsule, Rfl: 03    fluorometholone 0.1% (FML) 0.1 % DrpS, , Disp: , Rfl:     furosemide (LASIX) 20 MG tablet, TAKE 1 TABLET(20 MG) BY MOUTH EVERY DAY, Disp: 90 tablet, Rfl: 1    hydrocortisone 2.5 % cream, Apply topically 2 (two) times daily., Disp: 1 each, Rfl: 1    levothyroxine (SYNTHROID) 25 MCG tablet, Take 1 tablet (25 mcg total) by mouth before breakfast., Disp: 30 tablet, Rfl: 11    magnesium oxide (MAG-OX) 400 mg (241.3 mg magnesium) tablet, Take 400 mg by mouth every evening., Disp: , Rfl:     omeprazole (PRILOSEC) 20 MG capsule, TAKE 1 CAPSULE(20 MG) BY MOUTH EVERY DAY, Disp: 90 capsule, Rfl: 1    potassium chloride SA (K-DUR,KLOR-CON) 20 MEQ tablet, TAKE 1 TABLET(20 MEQ) BY MOUTH EVERY DAY, Disp: 90 tablet, Rfl: 1    rosuvastatin (CRESTOR) 40 MG Tab, Take 1 tablet (40 mg total) by mouth once daily., Disp: 90 tablet, Rfl: 3    syringe with needle 3 mL 22 gauge x 3/4" Syrg, 6 Syringes by Misc.(Non-Drug; Combo Route) route every 30 days. Use to inject cyanocobalamin once every 30 days., Disp: , Rfl:     traMADoL (ULTRAM) 50 mg tablet, Take 1 tablet (50 mg total) by mouth every 6 (six) hours as needed for Pain., Disp: 21 tablet, Rfl: 0    vitamins A,C,E-zinc-copper (PRESERVISION AREDS) 14,320-226-200 unit-mg-unit Cap, Take by mouth., Disp: , Rfl:     zinc gluconate 50 mg tablet, Take 50 mg by mouth once daily., Disp: , Rfl:     Past Medical History:   Diagnosis Date    Interstitial lung disease     chronic shortness of breath    Raynaud's syndrome without gangrene     Sjogren's syndrome 12/05/2016    Systemic lupus erythematosus, " organ or system involvement unspecified        Past Surgical History:   Procedure Laterality Date    APPENDECTOMY      CHOLECYSTECTOMY      COLONOSCOPY      COLONOSCOPY N/A 6/9/2022    Procedure: COLONOSCOPY;  Surgeon: Eugene Del Castillo MD;  Location: [facility] ENDO;  Service: Endoscopy;  Laterality: N/A;    CORONARY STENT PLACEMENT      10/2017    EPIDURAL STEROID INJECTION INTO CERVICAL SPINE N/A 6/10/2022    Procedure: Injection-steroid-epidural-cervical;  Surgeon: Dickson Oshea MD;  Location: [facility] OR;  Service: Pain Management;  Laterality: N/A;    EPIDURAL STEROID INJECTION INTO LUMBAR SPINE N/A 3/21/2023    Procedure: Injection-steroid-epidural-lumbar L3/4 to left;  Surgeon: Dickson Oshea MD;  Location: [facility] OR;  Service: Pain Management;  Laterality: N/A;  allergy to midazolam, did not order xanax    ESOPHAGOGASTRODUODENOSCOPY N/A 6/9/2022    Procedure: EGD (ESOPHAGOGASTRODUODENOSCOPY);  Surgeon: Eugene Del Castillo MD;  Location: [facility] ENDO;  Service: Endoscopy;  Laterality: N/A;    FRACTURE SURGERY Right     foot    HYSTERECTOMY      1984    KNEE ARTHROSCOPY Right 2/2016    OOPHORECTOMY Bilateral     hyst. 1984    TOTAL REDUCTION MAMMOPLASTY Bilateral     1999    UPPER GASTROINTESTINAL ENDOSCOPY         Family History   Problem Relation Age of Onset    Cancer Mother     Ovarian cancer Mother 40    Cancer Father         esophageal    Esophageal cancer Father     Stroke Maternal Aunt     Rheum arthritis Maternal Aunt     Rheum arthritis Paternal Uncle     Breast cancer Maternal Cousin     Breast cancer Maternal Cousin     Glaucoma Neg Hx     Macular degeneration Neg Hx        Social History     Socioeconomic History    Marital status:    Tobacco Use    Smoking status: Never    Smokeless tobacco: Never   Substance and Sexual Activity    Alcohol use: Yes     Comment: rarely    Drug use: No    Sexual activity: Yes     Partners: Male     Social Determinants of Health     Financial Resource Strain: Low Risk   "   Difficulty of Paying Living Expenses: Not hard at all   Food Insecurity: No Food Insecurity    Worried About Running Out of Food in the Last Year: Never true    Ran Out of Food in the Last Year: Never true   Transportation Needs: No Transportation Needs    Lack of Transportation (Medical): No    Lack of Transportation (Non-Medical): No   Physical Activity: Inactive    Days of Exercise per Week: 0 days    Minutes of Exercise per Session: 0 min   Stress: No Stress Concern Present    Feeling of Stress : Only a little   Social Connections: Moderately Integrated    Frequency of Communication with Friends and Family: Three times a week    Frequency of Social Gatherings with Friends and Family: Three times a week    Attends Denominational Services: More than 4 times per year    Active Member of Clubs or Organizations: No    Attends Club or Organization Meetings: Never    Marital Status:    Housing Stability: Unknown    Unable to Pay for Housing in the Last Year: No    Unstable Housing in the Last Year: No       Review of patient's allergies indicates:   Allergen Reactions    Ceftin [cefuroxime axetil] Itching    Gabapentin      Felt like "no blood flow to my legs"    Lyrica [pregabalin] Other (See Comments)     Diarrhea     Metoprolol      Leg pain    [id number] antimigraine agents      Was told never to take this medication due to vascular issues - Per Neurologist     Versed [midazolam]      "wants to come out of my skin - I get hyped"       Review of Systems:  General ROS: negative for - fever  Psychological ROS: negative for - hostility  Hematological and Lymphatic ROS: negative for - bleeding problems  Endocrine ROS: negative for - unexpected weight changes  Respiratory ROS: no cough, shortness of breath, or wheezing  Cardiovascular ROS: no chest pain or dyspnea on exertion  Gastrointestinal ROS: no abdominal pain, change in bowel habits, or black or bloody stools  Musculoskeletal ROS: positive for - muscular " weakness  Neurological ROS: positive for - numbness/tingling  Dermatological ROS: negative for rash    Physical Exam:  Vitals:    04/04/23 1128   BP: (!) 145/69   Pulse: 65   PainSc: 0-No pain         There is no height or weight on file to calculate BMI.     Gen: NAD  Psych: mood appropriate for given condition, emotional, frustrated with pain  CV: 2+ radial pulse  HEENT: anicteric   Respiratory: non labored  Abd: soft nt, nd  Skin: intact    Gait:  Antalgic gait  ROM: limited AROM of the L spine in all planes, full ROM at ankles, knees and hips  Lumbar flexion 90 degrees, extension 50 degrees, side bending 30 degrees.    Sensation: intact to light touch in all dermatomes tested from L2-S1 bilaterally  Reflexes: 2+ bilateral patella and Achilles  Tone: normal in the b/l knees and hips   Skin: intact  Extremities: No edema in b/l ankles or hands             Right Left   L2/3 Iliacus Hip flexion  5  5   L3/4 Qudratus Femoris Knee Extension  5  5   L4/5 Tib Anterior Ankle Dorsiflexion   5  5   L5/S1 Extensor Hallicus Longus Great toe extension  5  5                           S1/S2 Gastroc/Soleus Plantar Flexion  5  5           Imaging:  MRI cervical spine 7/15/21  FINDINGS:  Straightening of normal cervical lordosis.  Dextrocurvature.  No spondylolisthesis.  No acute fracture with preserved vertebral body heights.  No marrow replacement.  Multilevel disc desiccation.  Mild disc height loss at C4-5 through C6-7.  Cervical cord normal in signal.  Slight ventral cord flattening focally at C3-4 and C5-6.  There is a partially imaged T2 hyperintense lesion along the right C6-7 neural foramen which measures at least 5 mm.    At C2-3 there is bilateral facet degenerative changes especially on the left.  Partial effacement ventral CSF space.  Moderate left neural foraminal narrowing.  At C3-4 minimal posterior disc bulge.  Bilateral uncovertebral spurs.  Bilateral facet degenerative changes.  Complete effacement ventral and  partial effacement dorsal CSF space.  Moderate right neural foraminal narrowing.  At C4-5 posterior disc osteophyte complex and bilateral uncovertebral spurs and left facet degenerative change.  Partial effacement ventral and dorsal CSF space.  Mild right and severe left neural foraminal narrowing.  At C5-6 posterior disc osteophyte complex asymmetric to the right and bilateral uncovertebral spurs.  Complete effacement ventral CSF space and partial effacement dorsal CSF space.  At C6-7 posterior disc osteophyte complex asymmetric to the right and bilateral uncovertebral spurs.  Near complete effacement ventral CSF space.  Mild right neural foraminal narrowing.  At T3-4 and T4-5 there is right facet degenerative change, seen only on sagittal.    EMG/NCS 7/28/21  Impression: This is a slightly abnormal EMG of the left upper extremity.  There is electrophysiologic evidence most consistent with a very mild, left, C6 radiculopathy without active denervation.  There is no evidence of any other radiculopathy, focal neuropathy, peripheral neuropathy, or plexopathy on this study.  The patient's symptoms of intermittent sensory changes could be secondary to intermittent compression not resulting in demyelination or axonal nerve injury    MRI cervical spine 07/23/2022  FINDINGS:  Lateral curvature of the cervical spine convex to the right.  No fracture or destructive lesion.  Spinal cord demonstrates normal signal throughout all allowing for artifact.     C2-C3 demonstrates 1 or 2 mm anterolisthesis, moderate left-sided facet arthrosis with osteophytic hypertrophy, mild left-sided osseous foraminal narrowing.  C3-C4 demonstrates mild disc bulge, moderate right-sided facet arthrosis, mild moderate right-sided foraminal narrowing and possible nerve root contact.  C4-C5 demonstrates moderate disc space narrowing, osteophytic lipping and mild uncovertebral spurring along the posterior disc margin, desiccated disc bulge  superimposed, contact of the anterior spinal cord, mild left-sided facet arthrosis, moderate left-sided foraminal narrowing with suspected nerve root contact possible impingement.  C5-C6 demonstrates moderate disc space narrowing, severe broad-based disc bulge with anterior spinal cord contact, suspected central protrusion type herniation extending 2 mm beyond the disc plane, no significant foraminal narrowing.  C6-C7 demonstrates moderate disc space narrowing, moderate broad-based desiccated disc bulge, moderate foraminal narrowing on the right with nerve root contact.  C7-T1 demonstrates no significant disc bulge, herniation, spinal canal narrowing, foraminal narrowing.     Impression:     Multilevel disc space narrowing and disc bulging most significant at C4-C5 through C6-C7.  Suspected superimposed desiccated central protrusion type herniation C5-C6 with anterior spinal cord contact.     Multilevel foraminal narrowing, likely most significant at C4-C5 on the left.     Minimal interval change from the prior exam.    MRI thoracic spine 07/23/2022  FINDINGS:  No fracture or destructive lesion evident.  Vertebral body height is maintained.  There is lateral curvature of the upper thoracic region convex to the left, lower midthoracic region convex to the right.  Spinal cord demonstrates normal signal throughout.     Mild facet arthrosis noted at the upper thoracic levels.  Mild facet arthrosis noted along concave margin of the curvature at the mid to upper thoracic levels.  No significant foraminal narrowing evident.  Disc spaces demonstrate mild narrowing at the lower thoracic segments.  No significant disc bulge, herniation at any level.  No significant spinal canal narrowing at any level.     Paraspinal soft tissues and visualized intrathoracic structures are unremarkable.  There is a left renal cyst incidentally noted.     MRI lumbar spine 07/23/2022  FINDINGS:  No fracture or destructive lesion evident.  Distal  spinal cord and conus are grossly normal.     L1-L2 demonstrates near normal disc space signal and height without significant disc bulge, herniation, spinal canal narrowing, foraminal narrowing.  L2-L3 demonstrates mild disc space narrowing and desiccation, mild disc bulging asymmetric to the left, no significant foraminal narrowing.  L3-L4 demonstrates mild disc space narrowing asymmetric to the left, suspected broad-based left posterolateral protrusion type herniation extending 3-4 mm beyond the disc plane, left-sided lateral recess narrowing with contact of the descending left L4 nerve root, mild left-sided foraminal narrowing with extraforaminal nerve root contact.  Mild foraminal narrowing on the right.  L4-5 demonstrates mild disc space narrowing, 2 mm anterolisthesis, moderate disc bulge, moderate right-sided facet arthrosis with osteophytic hypertrophy without edema, minimal foraminal narrowing.  L5-S1 demonstrates near normal disc space signal, 1 or 2 mm anterolisthesis, mild lateral disc bulge, severe left-sided facet arthrosis with osteophytic hypertrophy without significant edema, mild left-sided lateral recess narrowing, moderate left-sided foraminal narrowing with nerve root distortion.      Labs:  BMP  Lab Results   Component Value Date     01/13/2023    K 4.6 01/13/2023     01/13/2023    CO2 29 01/13/2023    BUN 20 (H) 01/13/2023    CREATININE 0.86 01/13/2023    CALCIUM 9.5 01/13/2023    ANIONGAP 5 (L) 01/13/2023    ESTGFRAFRICA >60 04/27/2022    EGFRNONAA >60 04/27/2022     Lab Results   Component Value Date    ALT 12 01/13/2023    AST 21 01/13/2023    ALKPHOS 93 01/13/2023    BILITOT 0.4 01/13/2023       Assessment:   Problem List Items Addressed This Visit          Neuro    Cervical radiculopathy     Other Visit Diagnoses       Lumbar radiculopathy    -  Primary    Dorsalgia                      68 y.o. year old female with PMH CAD, h/o CVA on plavix, HTN, presents to the office with  neck pain.  She's had this pain for about the past year.  Today her pain is 6/10, constant, aching, burning, numb, radiating down both of her arms.  She reports numbness and weakness.  Denies any bowel/bladder dysfunction    4/4/2023: Aye Montanez returns to the office for follow up.  She is s/p L3/4 SONYA with spread to the left on 03/21/2023 with 100% relief of her previous low back pain.  Overall she is very satisfied with the relief and rates her remaining low back pain as a 0/10.  She denies any new numbness in her lower extremities, weakness or any new changes to her bowel bladder function.       - on exam she has full strength in her lower extremities.  - She is s/p L3/4 SONYA with spread to the left on 03/21/2023 with 100% relief of her previous low back pain.   - I independently reviewed her lumbar MRI is consistent with L3-L4 demonstrates mild disc space narrowing asymmetric to the left, suspected broad-based left posterolateral protrusion type herniation extending 3-4 mm beyond the disc plane, left-sided lateral recess narrowing with contact of the descending left L4 nerve root,  - I believe she responded very well to the lumbar SONYA.  - discussed continuing to maintain her at-home PT directed exercises, tramadol sparingly, Tylenol, Cymbalta  - follow up as needed      : Reviewed     The above note was completed, in part, with the aid of Dragon dictation software/hardware. Translation errors may be present.

## 2023-04-13 ENCOUNTER — PATIENT MESSAGE (OUTPATIENT)
Dept: FAMILY MEDICINE | Facility: CLINIC | Age: 68
End: 2023-04-13
Payer: MEDICARE

## 2023-04-20 NOTE — TELEPHONE ENCOUNTER
Received pt portion of PAP.     Submitted PAP to [employer].   Agree with above. Pt reports improving melena, stools now mix of brown rather than black. Would f/u heme recommendations for ITP, suspect oozing from severe thrombocytopenia. If anemia/melena persists despite improvement in platelet count, we revisit endoscopic evaluation.

## 2023-04-21 ENCOUNTER — OFFICE VISIT (OUTPATIENT)
Dept: ORTHOPEDICS | Facility: CLINIC | Age: 68
End: 2023-04-21
Payer: MEDICARE

## 2023-04-21 VITALS — BODY MASS INDEX: 33.74 KG/M2 | HEIGHT: 67 IN | WEIGHT: 215 LBS

## 2023-04-21 DIAGNOSIS — M17.12 PRIMARY OSTEOARTHRITIS OF LEFT KNEE: Primary | ICD-10-CM

## 2023-04-21 PROCEDURE — 1125F PR PAIN SEVERITY QUANTIFIED, PAIN PRESENT: ICD-10-PCS | Mod: CPTII,S$GLB,, | Performed by: ORTHOPAEDIC SURGERY

## 2023-04-21 PROCEDURE — 20610 LARGE JOINT ASPIRATION/INJECTION: L KNEE: ICD-10-PCS | Mod: LT,S$GLB,, | Performed by: ORTHOPAEDIC SURGERY

## 2023-04-21 PROCEDURE — 3288F PR FALLS RISK ASSESSMENT DOCUMENTED: ICD-10-PCS | Mod: CPTII,S$GLB,, | Performed by: ORTHOPAEDIC SURGERY

## 2023-04-21 PROCEDURE — 1160F PR REVIEW ALL MEDS BY PRESCRIBER/CLIN PHARMACIST DOCUMENTED: ICD-10-PCS | Mod: CPTII,S$GLB,, | Performed by: ORTHOPAEDIC SURGERY

## 2023-04-21 PROCEDURE — 99214 PR OFFICE/OUTPT VISIT, EST, LEVL IV, 30-39 MIN: ICD-10-PCS | Mod: 25,S$GLB,, | Performed by: ORTHOPAEDIC SURGERY

## 2023-04-21 PROCEDURE — 3008F BODY MASS INDEX DOCD: CPT | Mod: CPTII,S$GLB,, | Performed by: ORTHOPAEDIC SURGERY

## 2023-04-21 PROCEDURE — 1159F MED LIST DOCD IN RCRD: CPT | Mod: CPTII,S$GLB,, | Performed by: ORTHOPAEDIC SURGERY

## 2023-04-21 PROCEDURE — 3288F FALL RISK ASSESSMENT DOCD: CPT | Mod: CPTII,S$GLB,, | Performed by: ORTHOPAEDIC SURGERY

## 2023-04-21 PROCEDURE — 99214 OFFICE O/P EST MOD 30 MIN: CPT | Mod: 25,S$GLB,, | Performed by: ORTHOPAEDIC SURGERY

## 2023-04-21 PROCEDURE — 1159F PR MEDICATION LIST DOCUMENTED IN MEDICAL RECORD: ICD-10-PCS | Mod: CPTII,S$GLB,, | Performed by: ORTHOPAEDIC SURGERY

## 2023-04-21 PROCEDURE — 1101F PR PT FALLS ASSESS DOC 0-1 FALLS W/OUT INJ PAST YR: ICD-10-PCS | Mod: CPTII,S$GLB,, | Performed by: ORTHOPAEDIC SURGERY

## 2023-04-21 PROCEDURE — 1125F AMNT PAIN NOTED PAIN PRSNT: CPT | Mod: CPTII,S$GLB,, | Performed by: ORTHOPAEDIC SURGERY

## 2023-04-21 PROCEDURE — 1160F RVW MEDS BY RX/DR IN RCRD: CPT | Mod: CPTII,S$GLB,, | Performed by: ORTHOPAEDIC SURGERY

## 2023-04-21 PROCEDURE — 3008F PR BODY MASS INDEX (BMI) DOCUMENTED: ICD-10-PCS | Mod: CPTII,S$GLB,, | Performed by: ORTHOPAEDIC SURGERY

## 2023-04-21 PROCEDURE — 99999 PR PBB SHADOW E&M-EST. PATIENT-LVL III: ICD-10-PCS | Mod: PBBFAC,,, | Performed by: ORTHOPAEDIC SURGERY

## 2023-04-21 PROCEDURE — 20610 DRAIN/INJ JOINT/BURSA W/O US: CPT | Mod: LT,S$GLB,, | Performed by: ORTHOPAEDIC SURGERY

## 2023-04-21 PROCEDURE — 1101F PT FALLS ASSESS-DOCD LE1/YR: CPT | Mod: CPTII,S$GLB,, | Performed by: ORTHOPAEDIC SURGERY

## 2023-04-21 PROCEDURE — 99999 PR PBB SHADOW E&M-EST. PATIENT-LVL III: CPT | Mod: PBBFAC,,, | Performed by: ORTHOPAEDIC SURGERY

## 2023-04-21 RX ORDER — TRIAMCINOLONE ACETONIDE 40 MG/ML
40 INJECTION, SUSPENSION INTRA-ARTICULAR; INTRAMUSCULAR
Status: DISCONTINUED | OUTPATIENT
Start: 2023-04-21 | End: 2023-04-21 | Stop reason: HOSPADM

## 2023-04-21 RX ADMIN — TRIAMCINOLONE ACETONIDE 40 MG: 40 INJECTION, SUSPENSION INTRA-ARTICULAR; INTRAMUSCULAR at 09:04

## 2023-04-21 NOTE — PROCEDURES
Large Joint Aspiration/Injection: L knee    Date/Time: 4/21/2023 9:00 AM  Performed by: Gigi Armenta MD  Authorized by: Gigi Armenta MD     Consent Done?:  Yes (Verbal)  Timeout: prior to procedure the correct patient, procedure, and site was verified    Prep: patient was prepped and draped in usual sterile fashion      Details:  Needle Size:  21 G  Approach:  Anterolateral  Location:  Knee  Site:  L knee  Medications:  40 mg triamcinolone acetonide 40 mg/mL  Patient tolerance:  Patient tolerated the procedure well with no immediate complications

## 2023-04-21 NOTE — PROGRESS NOTES
68 years old left knee pain.  He is feeling better from a recent lumbar injection still having pain in her knee requesting knee injection     Exam shows tenderness the joint line no signs infection instability     X-rays show arthritic changes    Assessment: Left knee arthrosis    Plan:  Kenalog injection left knee, we will wish her well on her upcoming cruise      Imaging studies ordered and reviewed by me    Further History  Aching pain  Worse with activity  Relieved with rest  No other associated symptoms  No other radiation    Further Exam  Alert and oriented  Pleasant  Contralateral limb has appropriate range of motion for age and condition  Contralateral limb has appropriate strength for age and condition  Contralateral limb has appropriate stability  for age and condition  No adenopathy  Pulses are appropriate for current condition  Skin is intact        Chief Complaint    Chief Complaint   Patient presents with    Left Knee - Pain       HPI  Aye Montanez is a 68 y.o.  female who presents with       Past Medical History  Past Medical History:   Diagnosis Date    Interstitial lung disease     chronic shortness of breath    Raynaud's syndrome without gangrene     Sjogren's syndrome 12/05/2016    Systemic lupus erythematosus, organ or system involvement unspecified        Past Surgical History  Past Surgical History:   Procedure Laterality Date    APPENDECTOMY      CHOLECYSTECTOMY      COLONOSCOPY      COLONOSCOPY N/A 6/9/2022    Procedure: COLONOSCOPY;  Surgeon: Eugene Del Castillo MD;  Location: [facility] ENDO;  Service: Endoscopy;  Laterality: N/A;    CORONARY STENT PLACEMENT      10/2017    EPIDURAL STEROID INJECTION INTO CERVICAL SPINE N/A 6/10/2022    Procedure: Injection-steroid-epidural-cervical;  Surgeon: Dickson Oshea MD;  Location: [facility] OR;  Service: Pain Management;  Laterality: N/A;    EPIDURAL STEROID INJECTION INTO LUMBAR SPINE N/A 3/21/2023    Procedure: Injection-steroid-epidural-lumbar L3/4 to  "left;  Surgeon: Dickson Oshea MD;  Location: [facility] OR;  Service: Pain Management;  Laterality: N/A;  allergy to midazolam, did not order xanax    ESOPHAGOGASTRODUODENOSCOPY N/A 6/9/2022    Procedure: EGD (ESOPHAGOGASTRODUODENOSCOPY);  Surgeon: Eugene Del Castillo MD;  Location: [facility] ENDO;  Service: Endoscopy;  Laterality: N/A;    FRACTURE SURGERY Right     foot    HYSTERECTOMY      1984    KNEE ARTHROSCOPY Right 2/2016    OOPHORECTOMY Bilateral     hyst. 1984    TOTAL REDUCTION MAMMOPLASTY Bilateral     1999    UPPER GASTROINTESTINAL ENDOSCOPY         Medications  Current Outpatient Medications   Medication Sig    acetaminophen (TYLENOL) 500 MG tablet Take 1,000 mg by mouth every 6 (six) hours as needed for Pain.    alendronate (FOSAMAX) 70 MG tablet Take 1 tablet (70 mg total) by mouth every 7 days.    clopidogreL (PLAVIX) 75 mg tablet TAKE 1 TABLET BY MOUTH ONCE DAILY WITH ASPIRIN 81MG FOR 21 DAYS.** STOP ASPIRIN AND TAKE PLAVIX ALONE THEREAFTER    DULoxetine (CYMBALTA) 30 MG capsule Take 2 capsules (60 mg total) by mouth once daily.    fluorometholone 0.1% (FML) 0.1 % DrpS     furosemide (LASIX) 20 MG tablet TAKE 1 TABLET(20 MG) BY MOUTH EVERY DAY    hydrocortisone 2.5 % cream Apply topically 2 (two) times daily.    levothyroxine (SYNTHROID) 25 MCG tablet Take 1 tablet (25 mcg total) by mouth before breakfast.    magnesium oxide (MAG-OX) 400 mg (241.3 mg magnesium) tablet Take 400 mg by mouth every evening.    omeprazole (PRILOSEC) 20 MG capsule TAKE 1 CAPSULE(20 MG) BY MOUTH EVERY DAY    potassium chloride SA (K-DUR,KLOR-CON) 20 MEQ tablet TAKE 1 TABLET(20 MEQ) BY MOUTH EVERY DAY    rosuvastatin (CRESTOR) 40 MG Tab Take 1 tablet (40 mg total) by mouth once daily.    syringe with needle 3 mL 22 gauge x 3/4" Syrg 6 Syringes by Misc.(Non-Drug; Combo Route) route every 30 days. Use to inject cyanocobalamin once every 30 days.    traMADoL (ULTRAM) 50 mg tablet Take 1 tablet (50 mg total) by mouth every 6 (six) hours " "as needed for Pain.    vitamins A,C,E-zinc-copper (PRESERVISION AREDS) 14,320-226-200 unit-mg-unit Cap Take by mouth.    zinc gluconate 50 mg tablet Take 50 mg by mouth once daily.    amLODIPine (NORVASC) 2.5 MG tablet Take 1 tablet (2.5 mg total) by mouth once daily. (Patient not taking: Reported on 3/31/2023)     No current facility-administered medications for this visit.       Allergies  Review of patient's allergies indicates:   Allergen Reactions    Ceftin [cefuroxime axetil] Itching    Gabapentin      Felt like "no blood flow to my legs"    Lyrica [pregabalin] Other (See Comments)     Diarrhea     Metoprolol      Leg pain    [id number]-[id number] antimigraine agents      Was told never to take this medication due to vascular issues - Per Neurologist     Versed [midazolam]      "wants to come out of my skin - I get hyped"       Family History  Family History   Problem Relation Age of Onset    Cancer Mother     Ovarian cancer Mother 40    Cancer Father         esophageal    Esophageal cancer Father     Stroke Maternal Aunt     Rheum arthritis Maternal Aunt     Rheum arthritis Paternal Uncle     Breast cancer Maternal Cousin     Breast cancer Maternal Cousin     Glaucoma Neg Hx     Macular degeneration Neg Hx        Social History  Social History     Socioeconomic History    Marital status:    Tobacco Use    Smoking status: Never    Smokeless tobacco: Never   Substance and Sexual Activity    Alcohol use: Yes     Comment: rarely    Drug use: No    Sexual activity: Yes     Partners: Male     Social Determinants of Health     Financial Resource Strain: Low Risk     Difficulty of Paying Living Expenses: Not hard at all   Food Insecurity: No Food Insecurity    Worried About Running Out of Food in the Last Year: Never true    Ran Out of Food in the Last Year: Never true   Transportation Needs: No Transportation Needs    Lack of Transportation (Medical): No    Lack of Transportation (Non-Medical): No   Physical " Activity: Inactive    Days of Exercise per Week: 0 days    Minutes of Exercise per Session: 0 min   Stress: No Stress Concern Present    Feeling of Stress : Only a little   Social Connections: Moderately Integrated    Frequency of Communication with Friends and Family: Three times a week    Frequency of Social Gatherings with Friends and Family: Three times a week    Attends Mandaen Services: More than 4 times per year    Active Member of Clubs or Organizations: No    Attends Club or Organization Meetings: Never    Marital Status:    Housing Stability: Unknown    Unable to Pay for Housing in the Last Year: No    Unstable Housing in the Last Year: No               Review of Systems     Constitutional: Negative    HENT: Negative  Eyes: Negative  Respiratory: Negative  Cardiovascular: Negative  Musculoskeletal: HPI  Skin: Negative  Neurological: Negative  Hematological: Negative  Endocrine: Negative                 Physical Exam    There were no vitals filed for this visit.  Body mass index is 33.67 kg/m².  Physical Examination:     General appearance -  well appearing, and in no distress  Mental status - awake  Neck - supple  Chest -  symmetric air entry  Heart - normal rate   Abdomen - soft      Assessment     1. Primary osteoarthritis of left knee          Plan

## 2023-04-24 ENCOUNTER — DOCUMENTATION ONLY (OUTPATIENT)
Dept: REHABILITATION | Facility: HOSPITAL | Age: 68
End: 2023-04-24
Payer: MEDICARE

## 2023-04-24 NOTE — PROGRESS NOTES
[facility]  Physical Therapy Discharge Note    Name: Aye Montanez  [facility] Number: [id number]    Therapy Diagnosis:        Encounter Diagnoses   Name Primary?    Neck pain Yes    Cervical radiculopathy      Fall, subsequent encounter        Physician: Walter Ruffin PA-C     LAST Visit Date: 7/25/2022     Physician Orders: PT Eval and Treat   Medical Diagnosis from Referral: M54.12 (ICD-10-CM) - Cervical radiculopathy  Evaluation Date: 7/7/2022  Authorization Period Expiration: 12/31/2022  Plan of Care Expiration: 09/07/2022  Progress Note Due: 6th visit or 08/07/2022  Visit # / Visits authorized:3/20  FOTO: Completed on 07/07/2022     ASSESSMENT      Discharge reason: Patient returned to surgeon for further assessment.    Goals:   Goals:   Short Term Goals: 4 weeks   Patient will be independent with HEP for symptom management  Patient will increase range of motion >5 deg in cervical/thoracic spine in all affected planes to improve functional mobility  Patient will increase strength of BUE by at least 1/2 grade via MMT testing to allow for improved performance of ADLs and daily functional tasks  Patient will demonstrate single leg balance >10 sec B      Long Term Goals: 8 weeks   Patient will be independent with progressive HEP for continued strengthening to support return to previous level of function  Patient will have grossly symmetrical, pain-free range of motion for improved functional mobility  Patient will increase strength of BUE by at least 1 grade via MMT testing to allow for improved performance of ADLs and daily functional tasks  Patient will be able to walk >30 min  Patient will achieve <40% limitation as measured by the FOTO to demonstrate decreased functional disability  Patient will score >46/54 on Savage balance to indicate decreased fall risk    PLAN   This patient is discharged from physical therapy      Sami Nair, PT

## 2023-05-14 DIAGNOSIS — I25.10 CORONARY ARTERY DISEASE INVOLVING NATIVE CORONARY ARTERY OF NATIVE HEART WITHOUT ANGINA PECTORIS: ICD-10-CM

## 2023-05-14 DIAGNOSIS — E78.5 DYSLIPIDEMIA: ICD-10-CM

## 2023-05-14 NOTE — TELEPHONE ENCOUNTER
No care due was identified.  [facility] Embedded Care Due Messages. Reference number: [id number].   5/14/2023 12:28:39 PM CDT

## 2023-05-15 ENCOUNTER — PATIENT MESSAGE (OUTPATIENT)
Dept: RHEUMATOLOGY | Facility: CLINIC | Age: 68
End: 2023-05-15
Payer: MEDICARE

## 2023-05-15 RX ORDER — ROSUVASTATIN CALCIUM 40 MG/1
TABLET, COATED ORAL
Qty: 90 TABLET | Refills: 1 | Status: SHIPPED | OUTPATIENT
Start: 2023-05-15

## 2023-05-15 NOTE — TELEPHONE ENCOUNTER
Refill Decision Note   Aye Montanez  is requesting a refill authorization.  Brief Assessment and Rationale for Refill:  Approve     Medication Therapy Plan:       Medication Reconciliation Completed: No   Comments:     No Care Gaps recommended.     Note composed:9:06 AM 05/15/2023

## 2023-06-01 ENCOUNTER — OFFICE VISIT (OUTPATIENT)
Dept: URGENT CARE | Facility: CLINIC | Age: 68
End: 2023-06-01
Payer: MEDICARE

## 2023-06-01 VITALS
OXYGEN SATURATION: 98 % | DIASTOLIC BLOOD PRESSURE: 92 MMHG | RESPIRATION RATE: 16 BRPM | HEART RATE: 77 BPM | TEMPERATURE: 98 F | SYSTOLIC BLOOD PRESSURE: 152 MMHG

## 2023-06-01 DIAGNOSIS — R06.2 WHEEZING: ICD-10-CM

## 2023-06-01 DIAGNOSIS — J20.8 ACUTE BACTERIAL BRONCHITIS: Primary | ICD-10-CM

## 2023-06-01 DIAGNOSIS — R05.1 ACUTE COUGH: ICD-10-CM

## 2023-06-01 DIAGNOSIS — B96.89 ACUTE BACTERIAL BRONCHITIS: Primary | ICD-10-CM

## 2023-06-01 PROCEDURE — 71046 XR CHEST PA AND LATERAL: ICD-10-PCS | Mod: S$GLB,,, | Performed by: RADIOLOGY

## 2023-06-01 PROCEDURE — 99213 OFFICE O/P EST LOW 20 MIN: CPT | Mod: S$GLB,,, | Performed by: NURSE PRACTITIONER

## 2023-06-01 PROCEDURE — 99213 PR OFFICE/OUTPT VISIT, EST, LEVL III, 20-29 MIN: ICD-10-PCS | Mod: S$GLB,,, | Performed by: NURSE PRACTITIONER

## 2023-06-01 PROCEDURE — 71046 X-RAY EXAM CHEST 2 VIEWS: CPT | Mod: S$GLB,,, | Performed by: RADIOLOGY

## 2023-06-01 RX ORDER — DOXYCYCLINE 100 MG/1
100 CAPSULE ORAL EVERY 12 HOURS
Qty: 14 CAPSULE | Refills: 0 | Status: SHIPPED | OUTPATIENT
Start: 2023-06-01 | End: 2023-06-08

## 2023-06-01 RX ORDER — BENZONATATE 200 MG/1
200 CAPSULE ORAL 3 TIMES DAILY PRN
Qty: 30 CAPSULE | Refills: 0 | Status: SHIPPED | OUTPATIENT
Start: 2023-06-01

## 2023-06-01 NOTE — PROGRESS NOTES
Subjective:      Patient ID: Aye Montanez is a 68 y.o. female.    Vitals:  temperature is 98 °F (36.7 °C). Her blood pressure is 152/92 (abnormal) and her pulse is 77. Her respiration is 16 and oxygen saturation is 98%.     Chief Complaint: Sinus Problem    Pt presents with sinus nichole, wheezing, headache, hoarse, sore throat, cough x 2 weeks, worse this week.    Provider note begins below:  Patient denies any fever, chills, CP, nausea/vomiting or abdominal pain.  Patient is awake and alert.  She is answering all questions appropriately.  She is afebrile.  Patient also reports that she has been wheezing and had to use her nebulizer at home.    Sinus Problem  This is a new problem. The current episode started 1 to 4 weeks ago. The problem has been gradually worsening since onset. There has been no fever. She is experiencing no pain. Associated symptoms include congestion, coughing, headaches, a hoarse voice, sinus pressure and a sore throat. Pertinent negatives include no chills, diaphoresis or ear pain. Past treatments include acetaminophen (mucinex,albuteral, cough syrup). The treatment provided moderate relief.     Constitution: Negative. Negative for chills, sweating and fatigue.   HENT:  Positive for congestion, sinus pressure and sore throat. Negative for ear pain and facial swelling.    Neck: Negative for painful lymph nodes.   Cardiovascular: Negative.  Negative for chest trauma, chest pain and sob on exertion.   Eyes: Negative.  Negative for eye itching and eye pain.   Respiratory:  Positive for cough. Negative for chest tightness and asthma.    Gastrointestinal: Negative.  Negative for nausea, vomiting and diarrhea.   Endocrine: negative. cold intolerance and excessive thirst.   Genitourinary: Negative.  Negative for dysuria, frequency, urgency and hematuria.   Musculoskeletal:  Negative for pain, trauma and joint pain.   Skin: Negative.  Negative for rash, wound and hives.   Allergic/Immunologic:  Negative.  Negative for eczema, asthma, hives and itching.   Neurological:  Positive for headaches. Negative for disorientation and altered mental status.   Hematologic/Lymphatic: Negative.  Negative for swollen lymph nodes.   Psychiatric/Behavioral: Negative.  Negative for altered mental status, disorientation and confusion.     Objective:     Physical Exam   Constitutional: She is oriented to person, place, and time. She appears well-developed. She is cooperative.  Non-toxic appearance. She does not appear ill. No distress.   HENT:   Head: Normocephalic and atraumatic.   Ears:   Right Ear: Hearing, tympanic membrane, external ear and ear canal normal. impacted cerumen  Left Ear: Hearing, tympanic membrane, external ear and ear canal normal. impacted cerumen  Nose: Congestion present. No mucosal edema, rhinorrhea or nasal deformity. No epistaxis. Right sinus exhibits no maxillary sinus tenderness and no frontal sinus tenderness. Left sinus exhibits no maxillary sinus tenderness and no frontal sinus tenderness.   Mouth/Throat: Uvula is midline, oropharynx is clear and moist and mucous membranes are normal. No trismus in the jaw. Normal dentition. No uvula swelling. No oropharyngeal exudate, posterior oropharyngeal edema or posterior oropharyngeal erythema.   Eyes: Conjunctivae and lids are normal. No scleral icterus.   Neck: Trachea normal and phonation normal. Neck supple. No edema present. No erythema present. No neck rigidity present.   Cardiovascular: Normal rate, regular rhythm, normal heart sounds and normal pulses.   Pulmonary/Chest: Effort normal. No respiratory distress. She has no decreased breath sounds. She has wheezes (Expiratory). She has no rhonchi. She has no rales.   Abdominal: Normal appearance. Soft. flat abdomen There is no abdominal tenderness. There is no guarding, no left CVA tenderness and no right CVA tenderness.   Musculoskeletal: Normal range of motion.         General: No deformity.  Normal range of motion.   Lymphadenopathy:     She has no cervical adenopathy.   Neurological: no focal deficit. She is alert, oriented to person, place, and time and at baseline. She exhibits normal muscle tone. Coordination normal.   Skin: Skin is warm, dry, intact, not diaphoretic and not pale.   Psychiatric: Her speech is normal and behavior is normal. Mood, judgment and thought content normal.   Nursing note and vitals reviewed.         IMAGING-  XR CHEST PA AND LATERAL    Result Date: 6/1/2023  EXAMINATION: XR CHEST PA AND LATERAL CLINICAL HISTORY: Acute cough TECHNIQUE: PA and lateral views of the chest were performed. COMPARISON: Chest x-ray of January 10, 2022 FINDINGS: The lungs are clear, with normal appearance of pulmonary vasculature and no pleural effusion or pneumothorax. The cardiac silhouette is normal in size. The hilar and mediastinal contours are unremarkable. Bones are intact.     No acute abnormality. Electronically signed by: Joel Raphael MD Date:    06/01/2023 Time:    10:58      Assessment:     1. Acute bacterial bronchitis    2. Acute cough    3. Wheezing        Plan:     FOLLOWUP  Follow up if symptoms worsen or fail to improve, for PLEASE CONTACT PCP OR CONTACT THE EMERGENCY ROOM..     PATIENT INSTRUCTIONS  Patient Instructions   INSTRUCTIONS:  - Rest.  - Drink plenty of fluids.  - Take Tylenol and/or Ibuprofen as directed as needed for fever/pain.  Do not take more than the recommended dose.  - follow up with your PCP within the next 1-2 weeks as needed.  - You must understand that you have received an Urgent Care treatment only and that you may be released before all of your medical problems are known or treated.   - You, the patient, will arrange for follow up care as instructed.   - If your condition worsens or fails to improve we recommend that you receive another evaluation at the ER immediately or contact your PCP to discuss your concerns.   - You can call [phone] or  [phone] to help schedule an appointment with the appropriate provider.     -If you smoke cigarettes, it would be beneficial for you to stop.    OVER THE COUNTER RECOMMENDATIONS/SUGGESTIONS.     Make sure to stay well hydrated.     Use Nasal Saline to mechanically move any post nasal drip from your eustachian tube or from the back of your throat.     Use warm salt water gargles to ease your throat pain. Warm salt water gargles as needed for sore throat-  1/2 tsp salt to 1 cup warm water, gargle as desired.     Use an antihistamine such as Claritin, Zyrtec or Allegra to dry you out.      Use pseudoephedrine (behind the counter) to decongest. Pseudoephedrine  30 mg up to 240 mg /day. It can raise your blood pressure and give you palpitations.     Use mucinex (guaifenisin) to break up mucous up to 2400mg/day to loosen any mucous.   The mucinex DM pill has a cough suppressant that can be sedating. It can be used at night to stop the tickle at the back of your throat.  You can use Mucinex D (it has guaifenesin and a high dose of pseudoephedrine) in the mornings to help decongest.        Use Flonase 1-2 sprays/nostril per day. It is a local acting steroid nasal spray, if you develop a bloody nose, stop using the medication immediately.     Sometimes Nyquil at night is beneficial to help you get some rest, however it is sedating and it does have an antihistamine, and tylenol.     Honey is a natural cough suppressant that can be used.     Tylenol up to 4,000 mg a day is safe for short periods and can be used for body aches, pain, and fever. However in high doses and prolonged use it can cause liver irritation.     Ibuprofen is a non-steroidal anti-inflammatory that can be used for body aches, pain, and fever.However it can also cause stomach irritation if over used.         THANK YOU FOR ALLOWING ME TO PARTICIPATE IN YOUR HEALTHCARE,     Tex Pearce NP   Acute bacterial bronchitis  -     doxycycline (VIBRAMYCIN)  100 MG Cap; Take 1 capsule (100 mg total) by mouth every 12 (twelve) hours. for 7 days  Dispense: 14 capsule; Refill: 0  -     benzonatate (TESSALON) 200 MG capsule; Take 1 capsule (200 mg total) by mouth 3 (three) times daily as needed for Cough.  Dispense: 30 capsule; Refill: 0    Acute cough  -     XR CHEST PA AND LATERAL; Future; Expected date: 06/01/2023    Wheezing  -     XR CHEST PA AND LATERAL; Future; Expected date: 06/01/2023

## 2023-06-01 NOTE — PATIENT INSTRUCTIONS
INSTRUCTIONS:  - Rest.  - Drink plenty of fluids.  - Take Tylenol and/or Ibuprofen as directed as needed for fever/pain.  Do not take more than the recommended dose.  - follow up with your PCP within the next 1-2 weeks as needed.  - You must understand that you have received an Urgent Care treatment only and that you may be released before all of your medical problems are known or treated.   - You, the patient, will arrange for follow up care as instructed.   - If your condition worsens or fails to improve we recommend that you receive another evaluation at the ER immediately or contact your PCP to discuss your concerns.   - You can call [phone] or [phone] to help schedule an appointment with the appropriate provider.     -If you smoke cigarettes, it would be beneficial for you to stop.    OVER THE COUNTER RECOMMENDATIONS/SUGGESTIONS.     Make sure to stay well hydrated.     Use Nasal Saline to mechanically move any post nasal drip from your eustachian tube or from the back of your throat.     Use warm salt water gargles to ease your throat pain. Warm salt water gargles as needed for sore throat-  1/2 tsp salt to 1 cup warm water, gargle as desired.     Use an antihistamine such as Claritin, Zyrtec or Allegra to dry you out.      Use pseudoephedrine (behind the counter) to decongest. Pseudoephedrine  30 mg up to 240 mg /day. It can raise your blood pressure and give you palpitations.     Use mucinex (guaifenisin) to break up mucous up to 2400mg/day to loosen any mucous.   The mucinex DM pill has a cough suppressant that can be sedating. It can be used at night to stop the tickle at the back of your throat.  You can use Mucinex D (it has guaifenesin and a high dose of pseudoephedrine) in the mornings to help decongest.        Use Flonase 1-2 sprays/nostril per day. It is a local acting steroid nasal spray, if you develop a bloody nose, stop using the medication immediately.     Sometimes Nyquil at night  is beneficial to help you get some rest, however it is sedating and it does have an antihistamine, and tylenol.     Honey is a natural cough suppressant that can be used.     Tylenol up to 4,000 mg a day is safe for short periods and can be used for body aches, pain, and fever. However in high doses and prolonged use it can cause liver irritation.     Ibuprofen is a non-steroidal anti-inflammatory that can be used for body aches, pain, and fever.However it can also cause stomach irritation if over used.

## 2023-07-03 ENCOUNTER — TELEPHONE (OUTPATIENT)
Dept: FAMILY MEDICINE | Facility: CLINIC | Age: 68
End: 2023-07-03
Payer: MEDICARE

## 2023-07-03 NOTE — TELEPHONE ENCOUNTER
----- Message from Isidra Gay sent at 7/3/2023  9:39 AM CDT -----  Regarding: Sooner Appointment Request  Contact: patient at [phone]  Type:  Sooner Appointment Request    Caller is requesting a sooner appointment.  Caller declined first available appointment listed below.  Caller will not accept being placed on the waitlist and is requesting a message be sent to doctor.    Name of Caller:  patient at [phone]    When is the first available appointment?  none  Symptoms:  pain in upper left hip and buttocks    Additional Information:  Please call and advise. Thank you

## 2023-07-03 NOTE — TELEPHONE ENCOUNTER
Called patient, she states she has pain in her hip that is hurting very bad. She has tried over the counter medication as well as a muscle relaxer she had left over. Nothing is helping.     Was able to schedule patient for next Monday. She is going to go to urgent care to be evaluated and have this  addressed today to see what is going on as she is in pain

## 2023-07-28 DIAGNOSIS — I50.9 CHRONIC CONGESTIVE HEART FAILURE, UNSPECIFIED HEART FAILURE TYPE: ICD-10-CM

## 2023-07-28 DIAGNOSIS — I25.10 CORONARY ARTERY DISEASE INVOLVING NATIVE CORONARY ARTERY OF NATIVE HEART WITHOUT ANGINA PECTORIS: ICD-10-CM

## 2023-07-28 RX ORDER — CLOPIDOGREL BISULFATE 75 MG/1
TABLET ORAL
Qty: 90 TABLET | Refills: 1 | Status: SHIPPED | OUTPATIENT
Start: 2023-07-28

## 2023-07-28 NOTE — TELEPHONE ENCOUNTER
Care Due:                  Date            Visit Type   Department     Provider  --------------------------------------------------------------------------------                                EP -                              [facility]  Last Visit: 02-      CARE ([facility])   LIZY BAPTISTE                               -                              [facility]  Next Visit: 10-      CARE ([facility])   LIZY BAPTISTE                                                            Last  Test          Frequency    Reason                     Performed    Due Date  --------------------------------------------------------------------------------    Lipid Panel.  12 months..  rosuvastatin.............  09- 09-    TSH.........  12 months..  levothyroxine............  09- 09-    [facility] Embedded Care Due Messages. Reference number: [id number].   7/28/2023 10:56:23 AM CDT

## 2023-07-29 DIAGNOSIS — K21.9 GASTROESOPHAGEAL REFLUX DISEASE: ICD-10-CM

## 2023-07-29 NOTE — TELEPHONE ENCOUNTER
Refill Routing Note   Medication(s) are not appropriate for processing by [facility] for the following reason(s):      Required vitals abnormal    [facility] action(s):  Defer  Approve Care Due:  Labs due     Medication Therapy Plan: Patient has been on Clopidogrel and Omeprazole > 12 months      Appointments  past 12m or future 3m with PCP    Date Provider   Last Visit   2/2/2023 Jorge Luis Christine MD   Next Visit   10/17/2023 Jorge Luis Christine MD   ED visits in past 90 days: 0        Note composed:10:51 PM 07/28/2023

## 2023-07-29 NOTE — TELEPHONE ENCOUNTER
No care due was identified.  [facility] Embedded Care Due Messages. Reference number: [id number].   7/29/2023 3:48:50 PM CDT

## 2023-07-30 RX ORDER — OMEPRAZOLE 20 MG/1
CAPSULE, DELAYED RELEASE ORAL
Qty: 90 CAPSULE | Refills: 1 | Status: SHIPPED | OUTPATIENT
Start: 2023-07-30

## 2023-07-30 NOTE — TELEPHONE ENCOUNTER
Refill Decision Note   Aye Montanez  is requesting a refill authorization.  Brief Assessment and Rationale for Refill:  Approve     Medication Therapy Plan:  has been on clopidogrel and omeprazole together since 10/22      Comments:     Note composed:10:19 AM 07/30/2023             Appointments     Last Visit   2/2/2023 Jorge Luis Christine MD   Next Visit   10/17/2023 Jorge Luis Christine MD

## 2023-07-31 ENCOUNTER — TELEPHONE (OUTPATIENT)
Dept: OPTOMETRY | Facility: CLINIC | Age: 68
End: 2023-07-31

## 2023-07-31 ENCOUNTER — OFFICE VISIT (OUTPATIENT)
Dept: OPTOMETRY | Facility: CLINIC | Age: 68
End: 2023-07-31
Payer: MEDICARE

## 2023-07-31 ENCOUNTER — PATIENT MESSAGE (OUTPATIENT)
Dept: OPTOMETRY | Facility: CLINIC | Age: 68
End: 2023-07-31

## 2023-07-31 DIAGNOSIS — Z13.5 GLAUCOMA SCREENING: ICD-10-CM

## 2023-07-31 DIAGNOSIS — M35.01 KERATOCONJUNCTIVITIS SICCA, IN SJOGREN'S SYNDROME: Primary | ICD-10-CM

## 2023-07-31 DIAGNOSIS — H52.4 BILATERAL PRESBYOPIA: ICD-10-CM

## 2023-07-31 DIAGNOSIS — H43.393 VITREOUS FLOATERS, BILATERAL: ICD-10-CM

## 2023-07-31 DIAGNOSIS — H35.3131 EARLY DRY STAGE NONEXUDATIVE AGE-RELATED MACULAR DEGENERATION OF BOTH EYES: ICD-10-CM

## 2023-07-31 DIAGNOSIS — H25.13 NUCLEAR SCLEROSIS, BILATERAL: ICD-10-CM

## 2023-07-31 PROCEDURE — 1160F PR REVIEW ALL MEDS BY PRESCRIBER/CLIN PHARMACIST DOCUMENTED: ICD-10-PCS | Mod: CPTII,S$GLB,, | Performed by: OPTOMETRIST

## 2023-07-31 PROCEDURE — 3288F PR FALLS RISK ASSESSMENT DOCUMENTED: ICD-10-PCS | Mod: CPTII,S$GLB,, | Performed by: OPTOMETRIST

## 2023-07-31 PROCEDURE — 1101F PR PT FALLS ASSESS DOC 0-1 FALLS W/OUT INJ PAST YR: ICD-10-PCS | Mod: CPTII,S$GLB,, | Performed by: OPTOMETRIST

## 2023-07-31 PROCEDURE — 1125F AMNT PAIN NOTED PAIN PRSNT: CPT | Mod: CPTII,S$GLB,, | Performed by: OPTOMETRIST

## 2023-07-31 PROCEDURE — 1159F PR MEDICATION LIST DOCUMENTED IN MEDICAL RECORD: ICD-10-PCS | Mod: CPTII,S$GLB,, | Performed by: OPTOMETRIST

## 2023-07-31 PROCEDURE — 99999 PR PBB SHADOW E&M-EST. PATIENT-LVL III: CPT | Mod: PBBFAC,,, | Performed by: OPTOMETRIST

## 2023-07-31 PROCEDURE — 1125F PR PAIN SEVERITY QUANTIFIED, PAIN PRESENT: ICD-10-PCS | Mod: CPTII,S$GLB,, | Performed by: OPTOMETRIST

## 2023-07-31 PROCEDURE — 1160F RVW MEDS BY RX/DR IN RCRD: CPT | Mod: CPTII,S$GLB,, | Performed by: OPTOMETRIST

## 2023-07-31 PROCEDURE — 92014 COMPRE OPH EXAM EST PT 1/>: CPT | Mod: S$GLB,,, | Performed by: OPTOMETRIST

## 2023-07-31 PROCEDURE — 3288F FALL RISK ASSESSMENT DOCD: CPT | Mod: CPTII,S$GLB,, | Performed by: OPTOMETRIST

## 2023-07-31 PROCEDURE — 1159F MED LIST DOCD IN RCRD: CPT | Mod: CPTII,S$GLB,, | Performed by: OPTOMETRIST

## 2023-07-31 PROCEDURE — 1101F PT FALLS ASSESS-DOCD LE1/YR: CPT | Mod: CPTII,S$GLB,, | Performed by: OPTOMETRIST

## 2023-07-31 PROCEDURE — 99999 PR PBB SHADOW E&M-EST. PATIENT-LVL III: ICD-10-PCS | Mod: PBBFAC,,, | Performed by: OPTOMETRIST

## 2023-07-31 PROCEDURE — 92014 PR EYE EXAM, EST PATIENT,COMPREHESV: ICD-10-PCS | Mod: S$GLB,,, | Performed by: OPTOMETRIST

## 2023-07-31 RX ORDER — FLUOROMETHOLONE 1 MG/ML
SUSPENSION/ DROPS OPHTHALMIC
Qty: 5 ML | Refills: 1 | Status: SHIPPED | OUTPATIENT
Start: 2023-07-31 | End: 2024-07-31

## 2023-07-31 RX ORDER — FUROSEMIDE 20 MG/1
TABLET ORAL
Qty: 90 TABLET | Refills: 1 | Status: SHIPPED | OUTPATIENT
Start: 2023-07-31

## 2023-07-31 NOTE — PATIENT INSTRUCTIONS
"DRY EYES -- BURNING OR GORDON SYMPTOMS:  Use Over The Counter artificial tears as needed for dry eye symptoms.   Some common brands include:  Systane, Optive, Refresh, and Thera-Tears.  These drops can be used as frequently as desired, but may be most helpful use during long periods of concentrated work.  For example, reading / working at the computer. Start with 3-4x per day.     Nighttime Ophthalmic gel or ointments are available: Refresh PM, Genteal, and Lacrilube.    Avoid drops that "get redness out" (Visine, Murine, Clear Eyes), as these may contain medication that could further irritate the eyes, especially with chronic use.    ALLERGY EYES -- ITCHING SYMPTOMS:  Over the counter medications include--Pataday, Zaditor, and Alaway.  Use as directed 1-2 drops daily for symptoms of itching / watering eyes.  These drops will not help for dry eye or exposure symptoms.    REDNESS RELIEF:  Lumify---is a good redness reliever that will not cause irritation if used chronically.        FLASHES / FLOATERS / POSTERIOR VITREOUS DETACHMENT    Call the clinic if you have any further changes in symptoms.  Including:  Increased numbers of floaters or flashing lights, dimness or darkness that moves through or stays constant in your vision, or any pain in the eye (s).    You may sometimes see small specks or clouds moving in your field of vision.  They are called FLOATERS.  You can often see them when looking at a plain background, like a blank wall or blue virgil.  Floaters are actually tiny clumps of gel or cells inside the VITREOUS, the clear jelly-like fluid that fills the inside of your eye.    While these objects look like they are in front of your eye, they are actually floating inside.  What you see are the shadows they cast on the RETINA, the nerve layer at the back of the eye that senses light and allows you to see.      POSTERIOR VITREOUS DETACHMENT    The appearance of new floaters may be alarming.  If you suddenly " develop new floaters, you should contact your eye care professional  right away.    The retina can tear if the shrinking vitreous pulls away from the wall of the eye.  This sometimes causes a small amount of bleeding in the eye that may appear as new floaters.    A torn retina is always a serious problem, since it can lead to a retinal detachment.  You should see your eye care professional as soon as possible if:    even one new floater appears suddenly;  you see sudden flashes of light;  you notice other symptoms, like the loss of side vision, or a curtain closes down in your vision        POSTERIOR VITREOUS DETACHMENT is more common for people who:    are nearsighted;  have had cataract surgery;  have had YAG laser surgery of the eye;  have had inflammation inside the eye;  are over age 60.      While some floaters may remain visible, many of them will fade over time and become less noticeable.  Even if you've had some floaters for years, you should have your eyes checked as soon as possible if you notice new ones.    FLASHING LIGHTS    When the vitreous gel rubs or pulls on the retina, you may see what look like flashing lights or lightning streaks.  These flashes can appear off and on for several weeks or months.      Some people experience flashes of light that appear as jagged lines or heat waves in both eyes, lasting 10-20 minutes.  These flashes are caused by a spasm of blood vessels in the brain, which is called a migraine.    If a headache follows these flashes, it's called a migraine headache.  If   no headache occurs, these flashes are called Ophthalmic or Ocular Migraine.

## 2023-07-31 NOTE — TELEPHONE ENCOUNTER
----- Message from Yulia Klein MA sent at 7/31/2023  4:47 PM CDT -----  Type:  Patient Returning Call    Who Called:  Mavis   Who Left Message for Patient:  Marisela  Does the patient know what this is regarding?:  no  Best Call Back Number:  [phone]  Additional Information:  please advise says she missed a call at 4:30 form your office

## 2023-07-31 NOTE — PROGRESS NOTES
HPI    Eye pain-dry eyes    Pt complains of extreme dry eyes. States eyes have been terrible this past   month. States everything shes using is not helping. Using serum gtts, AT,   gel and warm and cold compress. States she has to stay in dark room all   day from light sensitivity.     Agree above   Had tried Restasis w/ no relief   Using Autologous serum for ~ 2 yrs w/ decreasing results --currently tid   -qid   Has used fml in past w/ temp relief     Last edited by TY Peterson, OD on 7/31/2023  9:01 AM.            Assessment /Plan     For exam results, see Encounter Report.    Keratoconjunctivitis sicca, in Sjogren's syndrome  -     fluorometholone 0.1% (FML) 0.1 % DrpS; I gtt OU tid prn to control Sjogren's flare ups  Dispense: 5 mL; Refill: 1    Early dry stage nonexudative age-related macular degeneration of both eyes    Nuclear sclerosis, bilateral    Glaucoma screening    Vitreous floaters, bilateral    Bilateral presbyopia      Longstanding h/o dry eye, inflammation   Topical otc gtts / gel gtts   H/o Plugs   Steroid gtts   Rx restasis ineffective   Currently w/ serum gtts w/ moderate relief     Continue all above as effective   Refilled FML gtts to use qd-tid prn     Message with report, then can f/u prn     2.   Minimal hard drusen OU -- update OCT next exam   Continue Preservision bid   Home amsler     3.   Vis sig NS/ cortical changes OS>OD ---borderline ready for consult, cautions driving     4.   Not suspect   5.   RD precautions given and reviewed. Patient knows to call/ message if any further changes in symptoms occur.  6.   Will RTC for updated refraction       Discussed and educated patient on current findings /plan.  RTC 1 year, prn if any changes / issues

## 2023-08-03 ENCOUNTER — TELEPHONE (OUTPATIENT)
Dept: NEUROLOGY | Facility: CLINIC | Age: 68
End: 2023-08-03
Payer: MEDICARE

## 2023-08-08 ENCOUNTER — OFFICE VISIT (OUTPATIENT)
Dept: NEUROLOGY | Facility: CLINIC | Age: 68
End: 2023-08-08
Payer: MEDICARE

## 2023-08-08 VITALS
WEIGHT: 218.69 LBS | SYSTOLIC BLOOD PRESSURE: 148 MMHG | BODY MASS INDEX: 34.33 KG/M2 | HEIGHT: 67 IN | RESPIRATION RATE: 19 BRPM | DIASTOLIC BLOOD PRESSURE: 78 MMHG

## 2023-08-08 DIAGNOSIS — R79.9 ABNORMAL FINDING OF BLOOD CHEMISTRY, UNSPECIFIED: ICD-10-CM

## 2023-08-08 DIAGNOSIS — R20.2 PARESTHESIA: Primary | ICD-10-CM

## 2023-08-08 DIAGNOSIS — R74.8 ABNORMAL LEVELS OF OTHER SERUM ENZYMES: ICD-10-CM

## 2023-08-08 PROCEDURE — 3077F SYST BP >= 140 MM HG: CPT | Mod: CPTII,S$GLB,, | Performed by: PSYCHIATRY & NEUROLOGY

## 2023-08-08 PROCEDURE — 1100F PR PT FALLS ASSESS DOC 2+ FALLS/FALL W/INJURY/YR: ICD-10-PCS | Mod: CPTII,S$GLB,, | Performed by: PSYCHIATRY & NEUROLOGY

## 2023-08-08 PROCEDURE — 1100F PTFALLS ASSESS-DOCD GE2>/YR: CPT | Mod: CPTII,S$GLB,, | Performed by: PSYCHIATRY & NEUROLOGY

## 2023-08-08 PROCEDURE — 99999 PR PBB SHADOW E&M-EST. PATIENT-LVL III: ICD-10-PCS | Mod: PBBFAC,,, | Performed by: PSYCHIATRY & NEUROLOGY

## 2023-08-08 PROCEDURE — 1159F MED LIST DOCD IN RCRD: CPT | Mod: CPTII,S$GLB,, | Performed by: PSYCHIATRY & NEUROLOGY

## 2023-08-08 PROCEDURE — 1126F PR PAIN SEVERITY QUANTIFIED, NO PAIN PRESENT: ICD-10-PCS | Mod: CPTII,S$GLB,, | Performed by: PSYCHIATRY & NEUROLOGY

## 2023-08-08 PROCEDURE — 3008F PR BODY MASS INDEX (BMI) DOCUMENTED: ICD-10-PCS | Mod: CPTII,S$GLB,, | Performed by: PSYCHIATRY & NEUROLOGY

## 2023-08-08 PROCEDURE — 3008F BODY MASS INDEX DOCD: CPT | Mod: CPTII,S$GLB,, | Performed by: PSYCHIATRY & NEUROLOGY

## 2023-08-08 PROCEDURE — 1160F PR REVIEW ALL MEDS BY PRESCRIBER/CLIN PHARMACIST DOCUMENTED: ICD-10-PCS | Mod: CPTII,S$GLB,, | Performed by: PSYCHIATRY & NEUROLOGY

## 2023-08-08 PROCEDURE — 3078F DIAST BP <80 MM HG: CPT | Mod: CPTII,S$GLB,, | Performed by: PSYCHIATRY & NEUROLOGY

## 2023-08-08 PROCEDURE — 3077F PR MOST RECENT SYSTOLIC BLOOD PRESSURE >= 140 MM HG: ICD-10-PCS | Mod: CPTII,S$GLB,, | Performed by: PSYCHIATRY & NEUROLOGY

## 2023-08-08 PROCEDURE — 1160F RVW MEDS BY RX/DR IN RCRD: CPT | Mod: CPTII,S$GLB,, | Performed by: PSYCHIATRY & NEUROLOGY

## 2023-08-08 PROCEDURE — 1126F AMNT PAIN NOTED NONE PRSNT: CPT | Mod: CPTII,S$GLB,, | Performed by: PSYCHIATRY & NEUROLOGY

## 2023-08-08 PROCEDURE — 3288F FALL RISK ASSESSMENT DOCD: CPT | Mod: CPTII,S$GLB,, | Performed by: PSYCHIATRY & NEUROLOGY

## 2023-08-08 PROCEDURE — 99215 PR OFFICE/OUTPT VISIT, EST, LEVL V, 40-54 MIN: ICD-10-PCS | Mod: S$GLB,,, | Performed by: PSYCHIATRY & NEUROLOGY

## 2023-08-08 PROCEDURE — 3078F PR MOST RECENT DIASTOLIC BLOOD PRESSURE < 80 MM HG: ICD-10-PCS | Mod: CPTII,S$GLB,, | Performed by: PSYCHIATRY & NEUROLOGY

## 2023-08-08 PROCEDURE — 1159F PR MEDICATION LIST DOCUMENTED IN MEDICAL RECORD: ICD-10-PCS | Mod: CPTII,S$GLB,, | Performed by: PSYCHIATRY & NEUROLOGY

## 2023-08-08 PROCEDURE — 99215 OFFICE O/P EST HI 40 MIN: CPT | Mod: S$GLB,,, | Performed by: PSYCHIATRY & NEUROLOGY

## 2023-08-08 PROCEDURE — 99999 PR PBB SHADOW E&M-EST. PATIENT-LVL III: CPT | Mod: PBBFAC,,, | Performed by: PSYCHIATRY & NEUROLOGY

## 2023-08-08 PROCEDURE — 3288F PR FALLS RISK ASSESSMENT DOCUMENTED: ICD-10-PCS | Mod: CPTII,S$GLB,, | Performed by: PSYCHIATRY & NEUROLOGY

## 2023-08-08 NOTE — PATIENT INSTRUCTIONS
Migrating paresthesias may be something like inflammation but will also check labs to look for other cause. If the bloodwork is normal, continue to work with pain management on pain control options.

## 2023-08-08 NOTE — PROGRESS NOTES
"  Date: 8/8/2023    Patient ID: Aye Montanez is a 68 y.o. female.    Chief Complaint: pain      History of Present Illness:  Ms. Montanez is a 68 y.o. female who presents for followup of spells/TIA. She has previously seen Leti Mccall NP. The patient was accompanied by her  who also contributed to the following history.     Interval history: She has random sharp shooting or shocking pains throughout the body all over in arms, legs, chest, hips, etc.     She has numbness from the elbow to the wrist. She still has pains in the neck.     She was started on lyrica but had GI side effects. She was started on Cymbalta 60 mg nightly which helps a little.     She is on plavix and having nose bleeds again.     EMG in 2021 showed a left C6 radiculopathy. No neuropathy.     Prior history from 2021:  She has had several spells this year of indeterminate etiology. These are well-described in Leti Mcclal's note from 6/28/21, but in summary: On 1/21/21, she had acute disorientation and didn't remember things and had tingling in her lips.  On 5/25, she had a near syncopal event and vertigo and a headache and was hypotensive. On 6/2/21 she had slurred speech, right facial droop, right facial and extremity tingling and spasm of the right foot (internal rotation of the right foot). She woke up with these symptoms. Repeat CTA and MRI brain were negative for acute changes (but does have intracranial stenosis) and she was diagnosed with a complex migraine. On 6/3/21 she developed similar symptoms on the left side (woke up that). She has also had 2-3 "TIAs" in the past with slurred speech and syncope. She has received tPA in the past. EEG in Feb 2021 was normal. She doesn't always get a headache with these episodes.      In August 2021, she had another episode. She had some stuttering speech and had a headache. She had no facial asymmetry.      When she saw Leti in June 2021, she changed her to DAPT x21 days and then " "plavix monotherapy thereafter. TTE checked and showed no PFO. She has some nose bleeds occasionally.      She tried taking magnesium and riboflavin supplements. She was on this when she had the other events in June 2021.      She still has dizziness and imbalance. She stumbles. Taking a shower, she notes that closing her eyes she gets a lot more dizzy. She has some shortness of breath and will be doing nuclear stress test tomorrow. She gets very bad itching at night. This is on her torso and her back all over and her abdomen. Her head itches as well. She has diagnosis of sjogrens.      Dr. Mccurdy performed an EMG which showed mild left C6 radiculopathy. MRI showed multilevel degenerative changes and a cyst at C6/7 on the right.        Allergies:  Review of patient's allergies indicates:   Allergen Reactions    Ceftin [cefuroxime axetil] Itching    Gabapentin      Felt like "no blood flow to my legs"    Lyrica [pregabalin] Other (See Comments)     Diarrhea     Metoprolol      Leg pain    [id number] antimigraine agents      Was told never to take this medication due to vascular issues - Per Neurologist     Versed [midazolam]      "wants to come out of my skin - I get hyped"       Current Medications:  Current Outpatient Medications   Medication Sig Dispense Refill    acetaminophen (TYLENOL) 500 MG tablet Take 1,000 mg by mouth every 6 (six) hours as needed for Pain.      alendronate (FOSAMAX) 70 MG tablet Take 1 tablet (70 mg total) by mouth every 7 days. 4 tablet 11    amLODIPine (NORVASC) 2.5 MG tablet Take 1 tablet (2.5 mg total) by mouth once daily. 30 tablet 3    clopidogreL (PLAVIX) 75 mg tablet TAKE 1 TABLET BY MOUTH DAILY WITH ASPIRIN 81MG FOR 21 DAYS- THEN STOP ASPIRIN CONTINUE WITH CLOPIDOGREL 90 tablet 1    DULoxetine (CYMBALTA) 30 MG capsule Take 2 capsules (60 mg total) by mouth once daily. 60 capsule 03    furosemide (LASIX) 20 MG tablet TAKE 1 TABLET(20 MG) BY MOUTH EVERY DAY 90 tablet 1    " "hydrocortisone 2.5 % cream Apply topically 2 (two) times daily. 1 each 1    magnesium oxide (MAG-OX) 400 mg (241.3 mg magnesium) tablet Take 400 mg by mouth every evening.      omeprazole (PRILOSEC) 20 MG capsule TAKE 1 CAPSULE(20 MG) BY MOUTH EVERY DAY 90 capsule 1    potassium chloride SA (K-DUR,KLOR-CON) 20 MEQ tablet TAKE 1 TABLET(20 MEQ) BY MOUTH EVERY DAY 90 tablet 1    rosuvastatin (CRESTOR) 40 MG Tab TAKE 1 TABLET(40 MG) BY MOUTH EVERY DAY 90 tablet 1    syringe with needle 3 mL 22 gauge x 3/4" Syrg 6 Syringes by Misc.(Non-Drug; Combo Route) route every 30 days. Use to inject cyanocobalamin once every 30 days.      benzonatate (TESSALON) 200 MG capsule Take 1 capsule (200 mg total) by mouth 3 (three) times daily as needed for Cough. (Patient not taking: Reported on 8/8/2023) 30 capsule 0    fluorometholone 0.1% (FML) 0.1 % DrpS I gtt OU tid prn to control Sjogren's flare ups 5 mL 1    levothyroxine (SYNTHROID) 25 MCG tablet Take 1 tablet (25 mcg total) by mouth before breakfast. 30 tablet 11    traMADoL (ULTRAM) 50 mg tablet Take 1 tablet (50 mg total) by mouth every 6 (six) hours as needed for Pain. 21 tablet 0    vitamins A,C,E-zinc-copper (PRESERVISION AREDS) 14,320-226-200 unit-mg-unit Cap Take by mouth.      zinc gluconate 50 mg tablet Take 50 mg by mouth once daily.       No current facility-administered medications for this visit.       Past Medical History:  Past Medical History:   Diagnosis Date    Interstitial lung disease     chronic shortness of breath    Raynaud's syndrome without gangrene     Sjogren's syndrome 12/05/2016    Systemic lupus erythematosus, organ or system involvement unspecified        Past Surgical History:  Past Surgical History:   Procedure Laterality Date    APPENDECTOMY      CHOLECYSTECTOMY      COLONOSCOPY      COLONOSCOPY N/A 6/9/2022    Procedure: COLONOSCOPY;  Surgeon: Eugene Del Castillo MD;  Location: [facility];  Service: Endoscopy;  Laterality: N/A;    CORONARY STENT " "PLACEMENT      10/2017    EPIDURAL STEROID INJECTION INTO CERVICAL SPINE N/A 6/10/2022    Procedure: Injection-steroid-epidural-cervical;  Surgeon: Dickson Oshea MD;  Location: [facility] OR;  Service: Pain Management;  Laterality: N/A;    EPIDURAL STEROID INJECTION INTO LUMBAR SPINE N/A 3/21/2023    Procedure: Injection-steroid-epidural-lumbar L3/4 to left;  Surgeon: Dickson Oshea MD;  Location: [facility] OR;  Service: Pain Management;  Laterality: N/A;  allergy to midazolam, did not order xanax    ESOPHAGOGASTRODUODENOSCOPY N/A 6/9/2022    Procedure: EGD (ESOPHAGOGASTRODUODENOSCOPY);  Surgeon: Eugene Del Castillo MD;  Location: [facility] ENDO;  Service: Endoscopy;  Laterality: N/A;    FRACTURE SURGERY Right     foot    HYSTERECTOMY      1984    KNEE ARTHROSCOPY Right 2/2016    OOPHORECTOMY Bilateral     hyst. 1984    TOTAL REDUCTION MAMMOPLASTY Bilateral     1999    UPPER GASTROINTESTINAL ENDOSCOPY         Family History:  family history includes Breast cancer in her maternal cousin and maternal cousin; Cancer in her father and mother; Esophageal cancer in her father; Ovarian cancer (age of onset: 40) in her mother; Rheum arthritis in her maternal aunt and paternal uncle; Stroke in her maternal aunt.    Social History:   reports that she has never smoked. She has never used smokeless tobacco. She reports current alcohol use. She reports that she does not use drugs.    Physical Exam:  Vitals:    08/08/23 0924   BP: (!) 148/78   Resp: 19   Weight: 99.2 kg (218 lb 11.1 oz)   Height: 5' 7" (1.702 m)   PainSc: 0-No pain     Body mass index is 34.25 kg/m².    Neurological Exam:  Mental status: Awake and alert  Speech language: No dysarthria or aphasia on conversation  Cranial nerves: Face symmetric  Motor: Moves all extremities well  Reflexes: 3+ throughout  Sensory: intact to vibration in the extremities except diminished in right leg to knee. Diminished to pinprick in the right foot as well.   Coordination: No ataxia. No tremor.  " "    Data:  I have personally reviewed other provider's notes, labs, & imaging made available to me today.     Labs:  CBC:   Lab Results   Component Value Date    WBC 5.19 01/13/2023    HGB 12.4 01/13/2023    HCT 38.9 01/13/2023     01/13/2023    MCV 81 (L) 01/13/2023    RDW 15.9 (H) 01/13/2023     BMP:   Lab Results   Component Value Date     01/13/2023    K 4.6 01/13/2023     01/13/2023    CO2 29 01/13/2023    BUN 20 (H) 01/13/2023    CREATININE 0.86 01/13/2023     (H) 01/13/2023    CALCIUM 9.5 01/13/2023    MG 1.9 08/16/2019     LFTS;   Lab Results   Component Value Date    PROT 7.7 01/13/2023    ALBUMIN 4.4 01/13/2023    BILITOT 0.4 01/13/2023    AST 21 01/13/2023    ALKPHOS 93 01/13/2023    ALT 12 01/13/2023     COAGS: No results found for: "INR", "PROTIME", "PTT"  FLP:   Lab Results   Component Value Date    CHOL 165 09/09/2022    HDL 71 09/09/2022    LDLCALC 72.2 09/09/2022    TRIG 109 09/09/2022    CHOLHDL 43.0 09/09/2022       Imaging:  I have personally reviewed the imaging, MRI c spine shows no cord signal change.     Assessment and Plan:  Ms. Montanez is a 68 y.o. female here for migrating paresthesias. Will check some labs. Since it is migrating, I don't think EMG would be high yield. If labs unrevealing, discussed that the focus is then on pain control. She can f/u with pain management for this.     Paresthesia  -     Vitamin B12; Future; Expected date: 08/08/2023  -     VITAMIN B1; Future; Expected date: 08/08/2023  -     VITAMIN E; Future; Expected date: 08/08/2023  -     VITAMIN B6; Future; Expected date: 08/08/2023  -     VITAMIN B2; Future; Expected date: 08/08/2023  -     TSH; Future; Expected date: 08/08/2023  -     RPR; Future; Expected date: 08/08/2023  -     HEMOGLOBIN A1C; Future; Expected date: 08/08/2023    Abnormal levels of other serum enzymes  -     VITAMIN B6; Future; Expected date: 08/08/2023    Abnormal finding of blood chemistry, unspecified  -     HEMOGLOBIN " A1C; Future; Expected date: 08/08/2023         I spent a total of 40 minutes on the day of the visit.This includes face to face time and non-face to face time preparing to see the patient (eg, review of tests), Obtaining and/or reviewing separately obtained history, Documenting clinical information in the electronic or other health record, Independently interpreting results and communicating results to the patient/family/caregiver, or Care coordination.

## 2023-08-23 ENCOUNTER — PATIENT MESSAGE (OUTPATIENT)
Dept: ORTHOPEDICS | Facility: CLINIC | Age: 68
End: 2023-08-23
Payer: MEDICARE

## 2023-08-24 ENCOUNTER — TELEPHONE (OUTPATIENT)
Dept: ORTHOPEDICS | Facility: CLINIC | Age: 68
End: 2023-08-24
Payer: MEDICARE

## 2023-08-24 NOTE — TELEPHONE ENCOUNTER
Contacted patient. Appointment made. Date, time & location was given. Patient verbalized understanding.

## 2023-08-25 ENCOUNTER — OFFICE VISIT (OUTPATIENT)
Dept: ORTHOPEDICS | Facility: CLINIC | Age: 68
End: 2023-08-25
Payer: MEDICARE

## 2023-08-25 VITALS — HEIGHT: 67 IN | BODY MASS INDEX: 34.21 KG/M2 | WEIGHT: 218 LBS

## 2023-08-25 DIAGNOSIS — M17.12 PRIMARY OSTEOARTHRITIS OF LEFT KNEE: Primary | ICD-10-CM

## 2023-08-25 PROCEDURE — 3288F PR FALLS RISK ASSESSMENT DOCUMENTED: ICD-10-PCS | Mod: CPTII,S$GLB,, | Performed by: ORTHOPAEDIC SURGERY

## 2023-08-25 PROCEDURE — 1159F PR MEDICATION LIST DOCUMENTED IN MEDICAL RECORD: ICD-10-PCS | Mod: CPTII,S$GLB,, | Performed by: ORTHOPAEDIC SURGERY

## 2023-08-25 PROCEDURE — 1125F AMNT PAIN NOTED PAIN PRSNT: CPT | Mod: CPTII,S$GLB,, | Performed by: ORTHOPAEDIC SURGERY

## 2023-08-25 PROCEDURE — 3288F FALL RISK ASSESSMENT DOCD: CPT | Mod: CPTII,S$GLB,, | Performed by: ORTHOPAEDIC SURGERY

## 2023-08-25 PROCEDURE — 99214 PR OFFICE/OUTPT VISIT, EST, LEVL IV, 30-39 MIN: ICD-10-PCS | Mod: 25,S$GLB,, | Performed by: ORTHOPAEDIC SURGERY

## 2023-08-25 PROCEDURE — 3008F BODY MASS INDEX DOCD: CPT | Mod: CPTII,S$GLB,, | Performed by: ORTHOPAEDIC SURGERY

## 2023-08-25 PROCEDURE — 3044F PR MOST RECENT HEMOGLOBIN A1C LEVEL <7.0%: ICD-10-PCS | Mod: CPTII,S$GLB,, | Performed by: ORTHOPAEDIC SURGERY

## 2023-08-25 PROCEDURE — 20610 LARGE JOINT ASPIRATION/INJECTION: L KNEE: ICD-10-PCS | Mod: LT,S$GLB,, | Performed by: ORTHOPAEDIC SURGERY

## 2023-08-25 PROCEDURE — 20610 DRAIN/INJ JOINT/BURSA W/O US: CPT | Mod: LT,S$GLB,, | Performed by: ORTHOPAEDIC SURGERY

## 2023-08-25 PROCEDURE — 99999 PR PBB SHADOW E&M-EST. PATIENT-LVL III: CPT | Mod: PBBFAC,,, | Performed by: ORTHOPAEDIC SURGERY

## 2023-08-25 PROCEDURE — 1101F PT FALLS ASSESS-DOCD LE1/YR: CPT | Mod: CPTII,S$GLB,, | Performed by: ORTHOPAEDIC SURGERY

## 2023-08-25 PROCEDURE — 99999 PR PBB SHADOW E&M-EST. PATIENT-LVL III: ICD-10-PCS | Mod: PBBFAC,,, | Performed by: ORTHOPAEDIC SURGERY

## 2023-08-25 PROCEDURE — 3044F HG A1C LEVEL LT 7.0%: CPT | Mod: CPTII,S$GLB,, | Performed by: ORTHOPAEDIC SURGERY

## 2023-08-25 PROCEDURE — 1101F PR PT FALLS ASSESS DOC 0-1 FALLS W/OUT INJ PAST YR: ICD-10-PCS | Mod: CPTII,S$GLB,, | Performed by: ORTHOPAEDIC SURGERY

## 2023-08-25 PROCEDURE — 99214 OFFICE O/P EST MOD 30 MIN: CPT | Mod: 25,S$GLB,, | Performed by: ORTHOPAEDIC SURGERY

## 2023-08-25 PROCEDURE — 1125F PR PAIN SEVERITY QUANTIFIED, PAIN PRESENT: ICD-10-PCS | Mod: CPTII,S$GLB,, | Performed by: ORTHOPAEDIC SURGERY

## 2023-08-25 PROCEDURE — 1159F MED LIST DOCD IN RCRD: CPT | Mod: CPTII,S$GLB,, | Performed by: ORTHOPAEDIC SURGERY

## 2023-08-25 PROCEDURE — 3008F PR BODY MASS INDEX (BMI) DOCUMENTED: ICD-10-PCS | Mod: CPTII,S$GLB,, | Performed by: ORTHOPAEDIC SURGERY

## 2023-08-25 RX ORDER — TRIAMCINOLONE ACETONIDE 40 MG/ML
40 INJECTION, SUSPENSION INTRA-ARTICULAR; INTRAMUSCULAR
Status: DISCONTINUED | OUTPATIENT
Start: 2023-08-25 | End: 2023-08-25 | Stop reason: HOSPADM

## 2023-08-25 RX ADMIN — TRIAMCINOLONE ACETONIDE 40 MG: 40 INJECTION, SUSPENSION INTRA-ARTICULAR; INTRAMUSCULAR at 10:08

## 2023-08-25 NOTE — PROCEDURES
Large Joint Aspiration/Injection: L knee    Date/Time: 8/25/2023 10:15 AM    Performed by: Gigi Armenta MD  Authorized by: Gigi Armenta MD    Consent Done?:  Yes (Verbal)  Timeout: prior to procedure the correct patient, procedure, and site was verified    Prep: patient was prepped and draped in usual sterile fashion      Details:  Needle Size:  21 G  Approach:  Anterolateral  Location:  Knee  Site:  L knee  Medications:  40 mg triamcinolone acetonide 40 mg/mL  Patient tolerance:  Patient tolerated the procedure well with no immediate complications

## 2023-08-25 NOTE — PROGRESS NOTES
68 y.o. year old presents to the clinic today with recurring pain in the left knee.  Chronic problem.  Patient has had Kenlaog injections in the past and responded well.  Last injection was 4 months ago.  Symptoms not improving    Exam shows tenderness at the joint line without signs of infection or instability    X-rays show arthritic changes    Assessment:  left knee arthrosis    Plan:  Kenlaog into the left knee.  Encourage strengthening over time.  Followup as needed.    We will wish her well on her upcoming trip to [place]

## 2023-08-31 ENCOUNTER — PATIENT MESSAGE (OUTPATIENT)
Dept: ORTHOPEDICS | Facility: CLINIC | Age: 68
End: 2023-08-31
Payer: MEDICARE

## 2023-09-20 ENCOUNTER — PATIENT MESSAGE (OUTPATIENT)
Dept: FAMILY MEDICINE | Facility: CLINIC | Age: 68
End: 2023-09-20
Payer: MEDICARE

## 2023-09-20 DIAGNOSIS — E78.5 DYSLIPIDEMIA: ICD-10-CM

## 2023-09-20 DIAGNOSIS — E03.4 HYPOTHYROIDISM DUE TO ACQUIRED ATROPHY OF THYROID: Primary | ICD-10-CM

## 2023-09-30 DIAGNOSIS — M54.12 CERVICAL RADICULOPATHY: ICD-10-CM

## 2023-10-03 ENCOUNTER — OFFICE VISIT (OUTPATIENT)
Dept: RHEUMATOLOGY | Facility: CLINIC | Age: 68
End: 2023-10-03
Payer: MEDICARE

## 2023-10-03 VITALS
BODY MASS INDEX: 31.97 KG/M2 | HEART RATE: 76 BPM | WEIGHT: 203.69 LBS | SYSTOLIC BLOOD PRESSURE: 132 MMHG | DIASTOLIC BLOOD PRESSURE: 79 MMHG | HEIGHT: 67 IN

## 2023-10-03 DIAGNOSIS — M79.7 FIBROMYALGIA: ICD-10-CM

## 2023-10-03 DIAGNOSIS — M35.00 SJOGREN'S SYNDROME WITHOUT EXTRAGLANDULAR INVOLVEMENT: ICD-10-CM

## 2023-10-03 DIAGNOSIS — M32.19 OTHER SYSTEMIC LUPUS ERYTHEMATOSUS WITH OTHER ORGAN INVOLVEMENT: Primary | ICD-10-CM

## 2023-10-03 PROCEDURE — 3044F HG A1C LEVEL LT 7.0%: CPT | Mod: CPTII,S$GLB,, | Performed by: INTERNAL MEDICINE

## 2023-10-03 PROCEDURE — 3078F PR MOST RECENT DIASTOLIC BLOOD PRESSURE < 80 MM HG: ICD-10-PCS | Mod: CPTII,S$GLB,, | Performed by: INTERNAL MEDICINE

## 2023-10-03 PROCEDURE — 1125F PR PAIN SEVERITY QUANTIFIED, PAIN PRESENT: ICD-10-PCS | Mod: CPTII,S$GLB,, | Performed by: INTERNAL MEDICINE

## 2023-10-03 PROCEDURE — 1101F PT FALLS ASSESS-DOCD LE1/YR: CPT | Mod: CPTII,S$GLB,, | Performed by: INTERNAL MEDICINE

## 2023-10-03 PROCEDURE — 3075F SYST BP GE 130 - 139MM HG: CPT | Mod: CPTII,S$GLB,, | Performed by: INTERNAL MEDICINE

## 2023-10-03 PROCEDURE — 99999 PR PBB SHADOW E&M-EST. PATIENT-LVL IV: ICD-10-PCS | Mod: PBBFAC,,, | Performed by: INTERNAL MEDICINE

## 2023-10-03 PROCEDURE — 3008F BODY MASS INDEX DOCD: CPT | Mod: CPTII,S$GLB,, | Performed by: INTERNAL MEDICINE

## 2023-10-03 PROCEDURE — 99215 OFFICE O/P EST HI 40 MIN: CPT | Mod: S$GLB,,, | Performed by: INTERNAL MEDICINE

## 2023-10-03 PROCEDURE — 99215 PR OFFICE/OUTPT VISIT, EST, LEVL V, 40-54 MIN: ICD-10-PCS | Mod: S$GLB,,, | Performed by: INTERNAL MEDICINE

## 2023-10-03 PROCEDURE — 3075F PR MOST RECENT SYSTOLIC BLOOD PRESS GE 130-139MM HG: ICD-10-PCS | Mod: CPTII,S$GLB,, | Performed by: INTERNAL MEDICINE

## 2023-10-03 PROCEDURE — 1160F PR REVIEW ALL MEDS BY PRESCRIBER/CLIN PHARMACIST DOCUMENTED: ICD-10-PCS | Mod: CPTII,S$GLB,, | Performed by: INTERNAL MEDICINE

## 2023-10-03 PROCEDURE — 3008F PR BODY MASS INDEX (BMI) DOCUMENTED: ICD-10-PCS | Mod: CPTII,S$GLB,, | Performed by: INTERNAL MEDICINE

## 2023-10-03 PROCEDURE — 1125F AMNT PAIN NOTED PAIN PRSNT: CPT | Mod: CPTII,S$GLB,, | Performed by: INTERNAL MEDICINE

## 2023-10-03 PROCEDURE — 3044F PR MOST RECENT HEMOGLOBIN A1C LEVEL <7.0%: ICD-10-PCS | Mod: CPTII,S$GLB,, | Performed by: INTERNAL MEDICINE

## 2023-10-03 PROCEDURE — 3288F PR FALLS RISK ASSESSMENT DOCUMENTED: ICD-10-PCS | Mod: CPTII,S$GLB,, | Performed by: INTERNAL MEDICINE

## 2023-10-03 PROCEDURE — 1159F PR MEDICATION LIST DOCUMENTED IN MEDICAL RECORD: ICD-10-PCS | Mod: CPTII,S$GLB,, | Performed by: INTERNAL MEDICINE

## 2023-10-03 PROCEDURE — 3078F DIAST BP <80 MM HG: CPT | Mod: CPTII,S$GLB,, | Performed by: INTERNAL MEDICINE

## 2023-10-03 PROCEDURE — 1159F MED LIST DOCD IN RCRD: CPT | Mod: CPTII,S$GLB,, | Performed by: INTERNAL MEDICINE

## 2023-10-03 PROCEDURE — 1101F PR PT FALLS ASSESS DOC 0-1 FALLS W/OUT INJ PAST YR: ICD-10-PCS | Mod: CPTII,S$GLB,, | Performed by: INTERNAL MEDICINE

## 2023-10-03 PROCEDURE — 99999 PR PBB SHADOW E&M-EST. PATIENT-LVL IV: CPT | Mod: PBBFAC,,, | Performed by: INTERNAL MEDICINE

## 2023-10-03 PROCEDURE — 1160F RVW MEDS BY RX/DR IN RCRD: CPT | Mod: CPTII,S$GLB,, | Performed by: INTERNAL MEDICINE

## 2023-10-03 PROCEDURE — 3288F FALL RISK ASSESSMENT DOCD: CPT | Mod: CPTII,S$GLB,, | Performed by: INTERNAL MEDICINE

## 2023-10-03 RX ORDER — DULOXETIN HYDROCHLORIDE 30 MG/1
CAPSULE, DELAYED RELEASE ORAL
Qty: 60 CAPSULE | Refills: 3 | Status: SHIPPED | OUTPATIENT
Start: 2023-10-03 | End: 2024-01-25

## 2023-10-03 ASSESSMENT — ROUTINE ASSESSMENT OF PATIENT INDEX DATA (RAPID3)
MDHAQ FUNCTION SCORE: 1.4
PAIN SCORE: 5.5
PSYCHOLOGICAL DISTRESS SCORE: 2.2
TOTAL RAPID3 SCORE: 5.72
FATIGUE SCORE: 1.1
PATIENT GLOBAL ASSESSMENT SCORE: 7

## 2023-10-03 NOTE — PROGRESS NOTES
"Final draft pending.   Subjective:          Chief Complaint: Aye Montanez is a 68 y.o. female who had concerns including Disease Management.    HPI:  All events:  Benlysta infusion too costly  Started Benlysta SQ 5/13/22 and continues.   Since then pain is 5/10 hands, legs and ankles.   Working with pain and NSGY for right hand weakness and numbnes, right sided occipital and facial numbness. MRI Brain, c spine, t spine an l spine w/o any clear explaination  Recent c/o diarrhea since 5/2022- working with GI but no abnormality on CT ABD. Held her Lyrica and markedly improved.   Started Cymbalta was started which has helped with her neve pain right face, she notes many of her random "zingers" in her body are lessened.     Since start Benlysta feet, ankles, hands and wrist are better. Less swelling, less stiffness , less pain. And tolerating. Still with fatigue, still with very dry eyes that are inflamed.   I was hoping ESR to be better.   No percogesic. Using tylenol arthritis- twice daily seems to be helping more than it did in the past.     She failed MTX  HCQ was ? But reviewed Meng last note I have and she stopped HCQ 2017 but no notation of maculopathy    Patient is having oligoarticular swelling in hands and feet. She is having peripheral polyarthritis. Severe dryness of her eyes.   She did clarify that Dr. Fan did not see maculopathy but only expressed his concerns about the eye.         Patient is a 67-year-old female she has a history of Sjogren syndrome (estimate start 2015)   To review her Rheum diagnosis:  presumed Sjogrens with ? of SLE  +OSMAN 1:1280 speckled  High titer SSA and SSB with + dry eye and dry mouth  dsDNA neg, Singh neg, RNP neg. C3 and C4 WNL.   + hx of thrombocytopenia, + leukopenia, +polyarthritis, + sicca, +Raynauds  RF and CCP negative.   She has c/o no strength in her hands, knees are painful. Feet burn with standing. No overt swelling of joints with the exception of left voler " wrist synovial cyst.    Diffuse pruritis w/o visible rash.   Trialed HCQ but patient concerned about macula and discontinued. She is followed by Dr. Fan the [facility] eye clinic.  She was taken off HCQ no active maculopathy but she was concerned about ASE. .   Trialed on MTX and failed.  Restasis despite burning back on  +punctal plugs. Did try Xiidra- ? If ever taken this visit.  She notes burning in her eyes.  Photophobia occasional redness to her eyes. burning Restasis no longer tolerable.    Never salagen/Evozac. . She did try xylimelts      Patient developed neurologic events starting 1/2021- but we discontinued nifedipine (Raynaud's) in event this was leading to hypotension. Patient described episode  with disorientation, tingling lips.  repeat episode 5/25/21 with vertigo and near syncope and again 6/2/21 slurred speech, right facial droop, Imagin has been neg. for acute changes. on 6/3/21 similar event but now on left. 8/2021 another episode.   Dr. Mccurdy performed an EMG which showed mild left C6 radiculopathy. MRI showed multilevel degenerative changes and a cyst at C6/7 on the right.   EEG 2o21 WNL.   Neuro dx: complex migrain vs TIA changes ASA to Plavix.   right leg numbness referred to vestibular therapy.   HA: consider occipital nerve block  Cervical spine and left arm symptoms Pain management. trial with Tramadol for now and medrol and if not improved will discuss SONYA once safe to hold Plavix. She elected not to take Lyrica. no reported ASE.     Patient has been having SOB, dizzyness. she has maximized therapy with Pulm with little improvement. Her last PFTs show no diffusion impairment, not hypoxic   PFT Results  02/26/2019: FEV1 2.20 (89%), FEV1/FVC 85, TLC 93%, DLCO 111%    Acute Pleurisy 12/2021 with right chest pain and pain with inspiration. COVID testing neg. Prednisone and Levaquin started. +productive cough.   : CTA neg for PE. but reticulonodular opacities compatible with pneumonitis ANGELIQUE  and LLL with patchy infiltrate in the RML and RLL. Trace right pleural effusion.   Seen with Pulm as of 1/12/22 given azithro   Cardiac: ECHO normal EF, no shunt, The estimated PA systolic pressure is 35 mmHg  completed stress testing with Dr. Peter ([facility]) pt states no new changes. +CAD with stenting 2017.         9/2021: continued neurologic events so we held off on starting Benlysta until completed work up. More recently presented to the ER with possible TIA.  We discontinued nifedipine for possible hypotensive episode are Raynaud's have been stable.   Events surrounding the 1st episode seem to be blood donation bilateral vertebral artery stenosis presenting with nystagmus vertigo and ataxia remark resolved with IV hydration and discontinuation of nifedipine.  Second event on did not involve a similar constellation of symptoms and just involved numbness and tingling.   continues with dizziness, mild SOB pending cardiac w/up with Dr. Peter      1/2021: flare with burning eyes, joint aches and pains and Raynauds persistent for 10 days. She tried hot shower/soak which helped temporarily. Started nifedipine 30mg daily 1/14/21 by 1/22/21 with episode of disorientation and hypotension. Full work up no seizure, no migraine she was aware at the time. Was having dizziness. No motor weakness. Slurred speech. Imaging revealed b/l vetebral arteries at 50%. And no seizure activity.   Off Nifedipine at BP marked improved. Unfortunately very effective for Raynauds.     Submitted for Benlysta with no affordable option for home injection despite [facility] support.     S/p viscosupplementation in knees with ortho 3/2018      Component      Latest Ref Rng & Units 8/16/2018   Anti Sm Antibody      0.00 - 19.99 EU 2.85   Anti-Sm Interpretation      Negative Negative   Anti-SSA Antibody      0.00 - 19.99 .02 (H)   Anti-SSA Interpretation      Negative Positive (A)   Anti-SSB Antibody      0.00 - 19.99 .37 (H)   Anti-SSB  Interpretation      Negative Positive (A)   Sed Rate      0 - 20 mm/Hr 33 (H)   CRP      0.0 - 8.2 mg/L 3.6   OSMAN Screen      Negative <1:160 Positive (A)   Complement (C-3)      50 - 180 mg/dL 129   Complement (C-4)      11 - 44 mg/dL 22   Complement,Total, Serum      42 - 95 U/mL 85   Rheumatoid Factor      0.0 - 15.0 IU/mL <10.0   CCP Antibodies      <5.0 U/mL <0.5     REVIEW OF SYSTEMS:    Review of Systems   Constitutional:  Positive for malaise/fatigue. Negative for fever and weight loss.   HENT:  Negative for sore throat.    Eyes:  Negative for double vision, photophobia and redness.   Respiratory:  Negative for cough, shortness of breath and wheezing.    Cardiovascular:  Negative for chest pain, palpitations and orthopnea.   Gastrointestinal:  Negative for abdominal pain, constipation and diarrhea.   Genitourinary:  Negative for dysuria, hematuria and urgency.   Musculoskeletal:  Positive for joint pain. Negative for back pain and myalgias.   Skin:  Positive for itching. Negative for rash.   Neurological:  Negative for dizziness, tingling, focal weakness and headaches.   Endo/Heme/Allergies:  Does not bruise/bleed easily.   Psychiatric/Behavioral:  Negative for depression, hallucinations and suicidal ideas.                Objective:            Past Medical History:   Diagnosis Date    Interstitial lung disease     chronic shortness of breath    Raynaud's syndrome without gangrene     Sjogren's syndrome 12/05/2016    Systemic lupus erythematosus, organ or system involvement unspecified      Family History   Problem Relation Age of Onset    Cancer Mother     Ovarian cancer Mother 40    Cancer Father         esophageal    Esophageal cancer Father     Stroke Maternal Aunt     Rheum arthritis Maternal Aunt     Rheum arthritis Paternal Uncle     Breast cancer Maternal Cousin     Breast cancer Maternal Cousin     Glaucoma Neg Hx     Macular degeneration Neg Hx      Social History     Tobacco Use    Smoking status:  "Never    Smokeless tobacco: Never   Substance Use Topics    Alcohol use: Yes     Comment: rarely    Drug use: No         Current Outpatient Medications on File Prior to Visit   Medication Sig Dispense Refill    acetaminophen (TYLENOL) 500 MG tablet Take 1,000 mg by mouth every 6 (six) hours as needed for Pain.      alendronate (FOSAMAX) 70 MG tablet Take 1 tablet (70 mg total) by mouth every 7 days. 4 tablet 11    amLODIPine (NORVASC) 2.5 MG tablet Take 1 tablet (2.5 mg total) by mouth once daily. 30 tablet 3    clopidogreL (PLAVIX) 75 mg tablet TAKE 1 TABLET BY MOUTH DAILY WITH ASPIRIN 81MG FOR 21 DAYS- THEN STOP ASPIRIN CONTINUE WITH CLOPIDOGREL 90 tablet 1    DULoxetine (CYMBALTA) 30 MG capsule TAKE 2 CAPSULES(60 MG) BY MOUTH EVERY DAY 60 capsule 03    fluorometholone 0.1% (FML) 0.1 % DrpS I gtt OU tid prn to control Sjogren's flare ups 5 mL 1    furosemide (LASIX) 20 MG tablet TAKE 1 TABLET(20 MG) BY MOUTH EVERY DAY 90 tablet 1    hydrocortisone 2.5 % cream Apply topically 2 (two) times daily. 1 each 1    magnesium oxide (MAG-OX) 400 mg (241.3 mg magnesium) tablet Take 400 mg by mouth every evening.      omeprazole (PRILOSEC) 20 MG capsule TAKE 1 CAPSULE(20 MG) BY MOUTH EVERY DAY 90 capsule 1    potassium chloride SA (K-DUR,KLOR-CON) 20 MEQ tablet TAKE 1 TABLET(20 MEQ) BY MOUTH EVERY DAY 90 tablet 1    rosuvastatin (CRESTOR) 40 MG Tab TAKE 1 TABLET(40 MG) BY MOUTH EVERY DAY 90 tablet 1    syringe with needle 3 mL 22 gauge x 3/4" Syrg 6 Syringes by Misc.(Non-Drug; Combo Route) route every 30 days. Use to inject cyanocobalamin once every 30 days.      traMADoL (ULTRAM) 50 mg tablet Take 1 tablet (50 mg total) by mouth every 6 (six) hours as needed for Pain. 21 tablet 0    vitamins A,C,E-zinc-copper (PRESERVISION AREDS) 14,320-226-200 unit-mg-unit Cap Take by mouth.      zinc gluconate 50 mg tablet Take 50 mg by mouth once daily.      benzonatate (TESSALON) 200 MG capsule Take 1 capsule (200 mg total) by mouth 3 " (three) times daily as needed for Cough. (Patient not taking: Reported on 8/8/2023) 30 capsule 0    levothyroxine (SYNTHROID) 25 MCG tablet Take 1 tablet (25 mcg total) by mouth before breakfast. 30 tablet 11    [DISCONTINUED] DULoxetine (CYMBALTA) 30 MG capsule Take 2 capsules (60 mg total) by mouth once daily. 60 capsule 03     No current facility-administered medications on file prior to visit.       Vitals:    10/03/23 1330   BP: 132/79   Pulse: 76       Physical Exam:    Physical Exam  Constitutional:       Appearance: Normal appearance. She is well-developed.   HENT:      Nose: No septal deviation.      Mouth/Throat:      Mouth: No oral lesions.   Eyes:      Conjunctiva/sclera:      Right eye: Right conjunctiva is not injected.      Left eye: Left conjunctiva is not injected.      Pupils: Pupils are equal, round, and reactive to light.   Neck:      Thyroid: No thyroid mass or thyromegaly.      Vascular: No JVD.   Cardiovascular:      Rate and Rhythm: Normal rate and regular rhythm.      Pulses: Normal pulses.      Comments: No edema  Pulmonary:      Effort: Pulmonary effort is normal.      Breath sounds: Normal breath sounds.   Abdominal:      Palpations: Abdomen is soft.   Musculoskeletal:      Right shoulder: Tenderness present. No swelling. Normal range of motion.      Left shoulder: Tenderness present. No swelling. Normal range of motion.      Right elbow: No swelling. Normal range of motion. No tenderness.      Left elbow: No swelling. Normal range of motion. No tenderness.      Right wrist: Tenderness present. No swelling. Normal range of motion.      Left wrist: Tenderness present. No swelling. Normal range of motion.      Right hand: Tenderness present.      Left hand: Tenderness present.      Right hip: Normal range of motion. Normal strength.      Left hip: No tenderness. Normal range of motion.      Right knee: No swelling. Normal range of motion. No tenderness.      Left knee: No swelling. Normal  range of motion. No tenderness.      Right ankle: No swelling. No tenderness. Normal range of motion.      Left ankle: No swelling. No tenderness. Normal range of motion.      Comments: Patient has some tenderness on the right MCP no overt synovitis no deformities.  No evidence of Raynaud's today but historical triphasic color change  She has marked crepitation bilateral knees with limitation in flexion she has full extension no laxity within the joint no active effusion.      Lymphadenopathy:      Cervical: No cervical adenopathy.   Skin:     General: Skin is dry.   Neurological:      Deep Tendon Reflexes: Reflexes are normal and symmetric.               Assessment:       Encounter Diagnoses   Name Primary?    Other systemic lupus erythematosus with other organ involvement Yes    Fibromyalgia     Sjogren's syndrome without extraglandular involvement             Plan:        Other systemic lupus erythematosus with other organ involvement  -     C-Reactive Protein; Future; Expected date: 10/03/2023  -     C4 Complement; Future; Expected date: 10/03/2023  -     Anti-DNA Ab, Double-Stranded; Future; Expected date: 10/03/2023  -     C3 Complement; Future; Expected date: 10/03/2023  -     Sedimentation rate; Future; Expected date: 10/03/2023  -     CBC Auto Differential; Future; Expected date: 10/03/2023  -     Comprehensive Metabolic Panel; Future; Expected date: 10/03/2023    Fibromyalgia    Sjogren's syndrome without extraglandular involvement  -     C-Reactive Protein; Future; Expected date: 10/03/2023  -     C4 Complement; Future; Expected date: 10/03/2023  -     Anti-DNA Ab, Double-Stranded; Future; Expected date: 10/03/2023  -     C3 Complement; Future; Expected date: 10/03/2023  -     Sedimentation rate; Future; Expected date: 10/03/2023  -     CBC Auto Differential; Future; Expected date: 10/03/2023  -     Comprehensive Metabolic Panel; Future; Expected date: 10/03/2023        Very pleasant 66-year-old female  "SLE/ Sjogren's bulk of her complaint is keratoconjunctivitis, until 2022 with joint pain.    We have monitored SANDOVAL/SOB ? Some ILD-CTD.    She was hesitant to try salagen.   Failed MTX with ASE   Discussed the joints, increasing leucopenia, worsening dry eyes, malaise and fatigue. And recent pleurisy unclear if this was infectious but chronic SOB/SANDOVAL despite maximized therapy.    HCQ may need to be re-visited. No confirmed maculopathy it was a rec from ophtho that the drug is a risk. I need to reconsider if this is an option for her.    We started Benlysta 5/2022- present and initially she noted a benefit but she is not sure now if this is helping at all. We are going to skip a few weeks and see if she appreciates a difference.   Quantiferon gold. Is negative.     KCS: continue with dry eyes.     Neuropathy: "zingers" are not following single distribution and we have attributed some to FMS she sees Neurology about this as well   Cymbalta has helped with this.    Gabapentin we discussed she did take a dose from her  with no adverse event, holding for now.       Follow up in about 4 months (around 2/3/2024).        F/u 4 months  Thank you for allowing me to participate in the care of this very pleasant patient.    40min consultation with greater than 50% of that time included Preparing to see the patient (review records, tests), Obtaining and/or reviewing separately obtained historical data, Performing a medically appropriate examination and/or evaluation , Ordering medications, tests, and/or procedures, Referring and communicating with other healthcare professionals , Documenting clinical information in the electronic or other health record and Independently interpreting results  (as warranted) & communicating results to the patient/family/caregiver. All questions answered.                  "

## 2023-10-17 ENCOUNTER — OFFICE VISIT (OUTPATIENT)
Dept: FAMILY MEDICINE | Facility: CLINIC | Age: 68
End: 2023-10-17
Payer: MEDICARE

## 2023-10-17 VITALS
SYSTOLIC BLOOD PRESSURE: 116 MMHG | DIASTOLIC BLOOD PRESSURE: 72 MMHG | OXYGEN SATURATION: 95 % | BODY MASS INDEX: 31.9 KG/M2 | HEART RATE: 77 BPM | TEMPERATURE: 98 F | HEIGHT: 67 IN | WEIGHT: 203.25 LBS

## 2023-10-17 DIAGNOSIS — E66.09 CLASS 1 OBESITY DUE TO EXCESS CALORIES WITH SERIOUS COMORBIDITY AND BODY MASS INDEX (BMI) OF 33.0 TO 33.9 IN ADULT: ICD-10-CM

## 2023-10-17 DIAGNOSIS — I25.10 CORONARY ARTERY DISEASE DUE TO CALCIFIED CORONARY LESION: ICD-10-CM

## 2023-10-17 DIAGNOSIS — Z12.31 ENCOUNTER FOR SCREENING MAMMOGRAM FOR BREAST CANCER: ICD-10-CM

## 2023-10-17 DIAGNOSIS — I25.84 CORONARY ARTERY DISEASE DUE TO CALCIFIED CORONARY LESION: ICD-10-CM

## 2023-10-17 DIAGNOSIS — Z00.00 ROUTINE PHYSICAL EXAMINATION: Primary | ICD-10-CM

## 2023-10-17 DIAGNOSIS — Z78.0 POST-MENOPAUSAL: ICD-10-CM

## 2023-10-17 DIAGNOSIS — M35.00 SJOGREN'S SYNDROME WITHOUT EXTRAGLANDULAR INVOLVEMENT: ICD-10-CM

## 2023-10-17 DIAGNOSIS — E78.5 DYSLIPIDEMIA: ICD-10-CM

## 2023-10-17 DIAGNOSIS — E03.9 ACQUIRED HYPOTHYROIDISM: ICD-10-CM

## 2023-10-17 DIAGNOSIS — M81.0 AGE-RELATED OSTEOPOROSIS WITHOUT CURRENT PATHOLOGICAL FRACTURE: ICD-10-CM

## 2023-10-17 PROCEDURE — 3044F HG A1C LEVEL LT 7.0%: CPT | Mod: CPTII,S$GLB,, | Performed by: INTERNAL MEDICINE

## 2023-10-17 PROCEDURE — 99397 PR PREVENTIVE VISIT,EST,65 & OVER: ICD-10-PCS | Mod: 25,S$GLB,, | Performed by: INTERNAL MEDICINE

## 2023-10-17 PROCEDURE — 90694 FLU VACCINE - QUADRIVALENT - ADJUVANTED: ICD-10-PCS | Mod: S$GLB,,, | Performed by: INTERNAL MEDICINE

## 2023-10-17 PROCEDURE — 3074F PR MOST RECENT SYSTOLIC BLOOD PRESSURE < 130 MM HG: ICD-10-PCS | Mod: CPTII,S$GLB,, | Performed by: INTERNAL MEDICINE

## 2023-10-17 PROCEDURE — 3044F PR MOST RECENT HEMOGLOBIN A1C LEVEL <7.0%: ICD-10-PCS | Mod: CPTII,S$GLB,, | Performed by: INTERNAL MEDICINE

## 2023-10-17 PROCEDURE — 1126F PR PAIN SEVERITY QUANTIFIED, NO PAIN PRESENT: ICD-10-PCS | Mod: CPTII,S$GLB,, | Performed by: INTERNAL MEDICINE

## 2023-10-17 PROCEDURE — 3288F PR FALLS RISK ASSESSMENT DOCUMENTED: ICD-10-PCS | Mod: CPTII,S$GLB,, | Performed by: INTERNAL MEDICINE

## 2023-10-17 PROCEDURE — 3288F FALL RISK ASSESSMENT DOCD: CPT | Mod: CPTII,S$GLB,, | Performed by: INTERNAL MEDICINE

## 2023-10-17 PROCEDURE — 3078F DIAST BP <80 MM HG: CPT | Mod: CPTII,S$GLB,, | Performed by: INTERNAL MEDICINE

## 2023-10-17 PROCEDURE — 3008F BODY MASS INDEX DOCD: CPT | Mod: CPTII,S$GLB,, | Performed by: INTERNAL MEDICINE

## 2023-10-17 PROCEDURE — G0008 ADMIN INFLUENZA VIRUS VAC: HCPCS | Mod: S$GLB,,, | Performed by: INTERNAL MEDICINE

## 2023-10-17 PROCEDURE — 1101F PT FALLS ASSESS-DOCD LE1/YR: CPT | Mod: CPTII,S$GLB,, | Performed by: INTERNAL MEDICINE

## 2023-10-17 PROCEDURE — 3008F PR BODY MASS INDEX (BMI) DOCUMENTED: ICD-10-PCS | Mod: CPTII,S$GLB,, | Performed by: INTERNAL MEDICINE

## 2023-10-17 PROCEDURE — 90694 VACC AIIV4 NO PRSRV 0.5ML IM: CPT | Mod: S$GLB,,, | Performed by: INTERNAL MEDICINE

## 2023-10-17 PROCEDURE — 1126F AMNT PAIN NOTED NONE PRSNT: CPT | Mod: CPTII,S$GLB,, | Performed by: INTERNAL MEDICINE

## 2023-10-17 PROCEDURE — 1159F MED LIST DOCD IN RCRD: CPT | Mod: CPTII,S$GLB,, | Performed by: INTERNAL MEDICINE

## 2023-10-17 PROCEDURE — 99999 PR PBB SHADOW E&M-EST. PATIENT-LVL IV: CPT | Mod: PBBFAC,,, | Performed by: INTERNAL MEDICINE

## 2023-10-17 PROCEDURE — 1101F PR PT FALLS ASSESS DOC 0-1 FALLS W/OUT INJ PAST YR: ICD-10-PCS | Mod: CPTII,S$GLB,, | Performed by: INTERNAL MEDICINE

## 2023-10-17 PROCEDURE — 99999 PR PBB SHADOW E&M-EST. PATIENT-LVL IV: ICD-10-PCS | Mod: PBBFAC,,, | Performed by: INTERNAL MEDICINE

## 2023-10-17 PROCEDURE — 3074F SYST BP LT 130 MM HG: CPT | Mod: CPTII,S$GLB,, | Performed by: INTERNAL MEDICINE

## 2023-10-17 PROCEDURE — 1159F PR MEDICATION LIST DOCUMENTED IN MEDICAL RECORD: ICD-10-PCS | Mod: CPTII,S$GLB,, | Performed by: INTERNAL MEDICINE

## 2023-10-17 PROCEDURE — G0008 FLU VACCINE - QUADRIVALENT - ADJUVANTED: ICD-10-PCS | Mod: S$GLB,,, | Performed by: INTERNAL MEDICINE

## 2023-10-17 PROCEDURE — 99397 PER PM REEVAL EST PAT 65+ YR: CPT | Mod: 25,S$GLB,, | Performed by: INTERNAL MEDICINE

## 2023-10-17 PROCEDURE — 3078F PR MOST RECENT DIASTOLIC BLOOD PRESSURE < 80 MM HG: ICD-10-PCS | Mod: CPTII,S$GLB,, | Performed by: INTERNAL MEDICINE

## 2023-10-17 NOTE — PROGRESS NOTES
Subjective:       Patient ID: Aye Montanez is a 68 y.o. female.  Chief Complaint: Annual Exam     HPI    Here for routine health maintenance.        Depression - controlled likely from multiple health issues not being resolved.  Will start Cymbalta soon.  Open to therapy.  Previously weaned off Lexapro.      hypothryoid - on very mild dose, but TSH elevated off this - thinks was on Biotin when trial off.      HLD - controlled, on statin.   CAD - s/p stents 2016.  NM stress test normal No chest pain.  Establishing with Dr Veloz next month     Sjogrens - Dr Bee  + Joint pain.    ILD? - still having SOB with exertional hypoxia; residual dry cough; January had bronchitis and pleurisy.  Dr Clive Mendozanauds      TIA x3 2021.  Last year episode of altered mental status.  Dr Mohan, Neurologist put back Plavix until likely April.  Initially thought to have been a CVA and received tPa.  States that neurology did not feel it was a stroke.   Complex migraine? - Episodes of muscle spasms in face, slurred voice, and right leg twisting uncontrollably.    Aortic atherosclerosis - stable.  Seen on CT     Neck vertebrae.  Left arm numbness in forearm distal.  Pain in left neck causing headaches.  MRI showed disk herniation touching anterior spinal cord at C4,5 and nerve impingement to the left there as well.  Seeing pain management so far without relief.  Refuses pain medicine for now with FH drug abuse.  Sent to neurosurgery, but told did not need surgery?           Assessment:       1. Routine physical examination    2. Post-menopausal    3. Encounter for screening mammogram for breast cancer    4. Acquired hypothyroidism    5. Age-related osteoporosis without current pathological fracture    6. Class 1 obesity due to excess calories with serious comorbidity and body mass index (BMI) of 33.0 to 33.9 in adult    7. Sjogren's syndrome without extraglandular involvement    8. Coronary artery disease, s/p stenting LAD x 2  10/2017    9. Dyslipidemia        Plan:       Routine physical examination    Post-menopausal  -     DXA Bone Density Axial Skeleton 1 or more sites; Future; Expected date: 10/17/2023    Encounter for screening mammogram for breast cancer  -     Mammo Digital Screening Bilat w/ Figueroa; Future; Expected date: 10/17/2023    Acquired hypothyroidism  -     TSH; Future; Expected date: 04/14/2024    Age-related osteoporosis without current pathological fracture    Class 1 obesity due to excess calories with serious comorbidity and body mass index (BMI) of 33.0 to 33.9 in adult    Sjogren's syndrome without extraglandular involvement    Coronary artery disease, s/p stenting LAD x 2 10/2017    Dyslipidemia  -     Lipid Panel; Future; Expected date: 04/14/2024            Wellness reviewed     Continue current management and monitor.  Other diagnoses were reviewed and found stable and will continue to monitor.  Counseled on regular exercise, maintenance of a healthy weight, balanced diet rich in fruits/vegetables and lean protein, and avoidance of unhealthy habits like smoking and excessive alcohol intake.   Also, counseled on importance of being compliant with medication, health appointments, diet and exercise.     Follow up in about 6 months (around 4/17/2024). Me in 6 mo, then once a year bc sees card in Oct/Nov.  Will stop tsh 2 mo < to see if can DC      Medication List with Changes/Refills   Current Medications    ACETAMINOPHEN (TYLENOL) 500 MG TABLET    Take 1,000 mg by mouth every 6 (six) hours as needed for Pain.    ALENDRONATE (FOSAMAX) 70 MG TABLET    Take 1 tablet (70 mg total) by mouth every 7 days.    AMLODIPINE (NORVASC) 2.5 MG TABLET    Take 1 tablet (2.5 mg total) by mouth once daily.    BENZONATATE (TESSALON) 200 MG CAPSULE    Take 1 capsule (200 mg total) by mouth 3 (three) times daily as needed for Cough.    CLOPIDOGREL (PLAVIX) 75 MG TABLET    TAKE 1 TABLET BY MOUTH DAILY WITH ASPIRIN 81MG FOR 21 DAYS- THEN  "STOP ASPIRIN CONTINUE WITH CLOPIDOGREL    DULOXETINE (CYMBALTA) 30 MG CAPSULE    TAKE 2 CAPSULES(60 MG) BY MOUTH EVERY DAY    FLUOROMETHOLONE 0.1% (FML) 0.1 % DRPS    I gtt OU tid prn to control Sjogren's flare ups    FUROSEMIDE (LASIX) 20 MG TABLET    TAKE 1 TABLET(20 MG) BY MOUTH EVERY DAY    HYDROCORTISONE 2.5 % CREAM    Apply topically 2 (two) times daily.    LEVOTHYROXINE (SYNTHROID) 25 MCG TABLET    Take 1 tablet (25 mcg total) by mouth before breakfast.    MAGNESIUM OXIDE (MAG-OX) 400 MG (241.3 MG MAGNESIUM) TABLET    Take 400 mg by mouth every evening.    OMEPRAZOLE (PRILOSEC) 20 MG CAPSULE    TAKE 1 CAPSULE(20 MG) BY MOUTH EVERY DAY    POTASSIUM CHLORIDE SA (K-DUR,KLOR-CON) 20 MEQ TABLET    TAKE 1 TABLET(20 MEQ) BY MOUTH EVERY DAY    ROSUVASTATIN (CRESTOR) 40 MG TAB    TAKE 1 TABLET(40 MG) BY MOUTH EVERY DAY    SYRINGE WITH NEEDLE 3 ML 22 GAUGE X 3/4" SYRG    6 Syringes by Misc.(Non-Drug; Combo Route) route every 30 days. Use to inject cyanocobalamin once every 30 days.    TRAMADOL (ULTRAM) 50 MG TABLET    Take 1 tablet (50 mg total) by mouth every 6 (six) hours as needed for Pain.    VITAMINS A,C,E-ZINC-COPPER (PRESERVISION AREDS) [phone],[id number] UNIT-MG-UNIT CAP    Take by mouth.    ZINC GLUCONATE 50 MG TABLET    Take 50 mg by mouth once daily.       BP Readings from Last 3 Encounters:   10/17/23 116/72   10/03/23 132/79   08/08/23 (!) 148/78     Hemoglobin A1C   Date Value Ref Range Status   08/08/2023 5.4 0.0 - 5.6 % Final     Comment:     Reference Interval:  5.0 - 5.6 Normal   5.7 - 6.4 High Risk   > 6.5 Diabetic      Hgb A1c results are standardized based on the (NGSP) National   Glycohemoglobin Standardization Program.      Hemoglobin A1C levels are related to mean serum/plasma glucose   during the preceding 2-3 months.        10/14/2021 5.6 0.0 - 5.6 % Final     Comment:     Reference Interval:  5.0 - 5.6 Normal   5.7 - 6.4 High Risk   > 6.5 Diabetic      Hgb A1c results are standardized based on " the (NGSP) National   Glycohemoglobin Standardization Program.      Hemoglobin A1C levels are related to mean serum/plasma glucose   during the preceding 2-3 months.        09/04/2018 5.2 0.0 - 5.6 % Final     Comment:     Reference Interval:  5.0 - 5.6 Normal   5.7 - 6.4 High Risk   > 6.5 Diabetic    Hgb A1c results are standardized based on the (NGSP) National   Glycohemoglobin Standardization Program.    Hemoglobin A1C levels are related to mean serum/plasma glucose   during the preceding 2-3 months.          Lab Results   Component Value Date    TSH 2.300 09/27/2023     Lab Results   Component Value Date    LDLCALC 67.6 09/27/2023    LDLCALC 72.2 09/09/2022    LDLCALC 95.0 04/27/2022     Lab Results   Component Value Date    TRIG 82 09/27/2023    TRIG 109 09/09/2022    TRIG 85 04/27/2022     Wt Readings from Last 3 Encounters:   10/17/23 92.2 kg (203 lb 4.2 oz)   10/03/23 92.4 kg (203 lb 11.3 oz)   08/25/23 98.9 kg (218 lb)     Lab Results   Component Value Date    HGB 13.3 09/27/2023    HCT 40.9 09/27/2023    WBC 3.68 (L) 09/27/2023    ALT 17 09/27/2023    AST 29 09/27/2023     09/27/2023    K 4.6 09/27/2023    CREATININE 0.69 09/27/2023           Review of Systems   Constitutional:  Negative for diaphoresis and fever.   HENT:  Negative for drooling and nosebleeds.    Eyes:  Negative for discharge and redness.   Respiratory:  Negative for apnea and choking.    Cardiovascular:  Negative for chest pain and palpitations.   Gastrointestinal:  Negative for abdominal pain and nausea.   Musculoskeletal:  Positive for myalgias.   Skin:  Negative for color change.   Neurological:  Negative for seizures and syncope.   Psychiatric/Behavioral:  Negative for behavioral problems.            Objective:      Vitals:    10/17/23 1427   BP: 116/72   Pulse: 77   Temp: 97.7 °F (36.5 °C)     Physical Exam  Vitals reviewed.   Eyes:      Conjunctiva/sclera: Conjunctivae normal.   Neck:      Thyroid: No thyromegaly.       Trachea: Trachea normal.   Cardiovascular:      Rate and Rhythm: Normal rate and regular rhythm.      Comments: Edema negative  Pulmonary:      Effort: Pulmonary effort is normal.      Breath sounds: Normal breath sounds.   Abdominal:      General: Bowel sounds are normal.      Palpations: Abdomen is soft. There is no hepatomegaly.   Musculoskeletal:      Cervical back: Normal range of motion.      Comments: ROM normal bilateral  Strength normal bilateral  Calves non TTP    Skin:     General: Skin is warm and dry.   Neurological:      Deep Tendon Reflexes: Reflexes are normal and symmetric.      Comments: + b/l intension tremor   Psychiatric:      Comments: Alert and Oriented

## 2023-11-02 ENCOUNTER — OFFICE VISIT (OUTPATIENT)
Dept: URGENT CARE | Facility: CLINIC | Age: 68
End: 2023-11-02
Payer: MEDICARE

## 2023-11-02 VITALS
OXYGEN SATURATION: 97 % | TEMPERATURE: 99 F | BODY MASS INDEX: 31.84 KG/M2 | SYSTOLIC BLOOD PRESSURE: 138 MMHG | HEART RATE: 87 BPM | HEIGHT: 67 IN | DIASTOLIC BLOOD PRESSURE: 83 MMHG | RESPIRATION RATE: 20 BRPM

## 2023-11-02 DIAGNOSIS — R05.9 COUGH, UNSPECIFIED TYPE: ICD-10-CM

## 2023-11-02 DIAGNOSIS — H93.8X3 EAR PRESSURE, BILATERAL: ICD-10-CM

## 2023-11-02 DIAGNOSIS — J02.9 SORE THROAT: ICD-10-CM

## 2023-11-02 DIAGNOSIS — J06.9 VIRAL URI WITH COUGH: Primary | ICD-10-CM

## 2023-11-02 LAB
CTP QC/QA: YES
MOLECULAR STREP A: NEGATIVE
POC MOLECULAR INFLUENZA A AGN: NEGATIVE
POC MOLECULAR INFLUENZA B AGN: NEGATIVE
SARS-COV-2 AG RESP QL IA.RAPID: NEGATIVE

## 2023-11-02 PROCEDURE — 87811 SARS-COV-2 COVID19 W/OPTIC: CPT | Mod: QW,S$GLB,, | Performed by: PHYSICIAN ASSISTANT

## 2023-11-02 PROCEDURE — 87811 SARS CORONAVIRUS 2 ANTIGEN POCT, MANUAL READ: ICD-10-PCS | Mod: QW,S$GLB,, | Performed by: PHYSICIAN ASSISTANT

## 2023-11-02 PROCEDURE — 99214 PR OFFICE/OUTPT VISIT, EST, LEVL IV, 30-39 MIN: ICD-10-PCS | Mod: S$GLB,,, | Performed by: PHYSICIAN ASSISTANT

## 2023-11-02 PROCEDURE — 87651 POCT STREP A MOLECULAR: ICD-10-PCS | Mod: QW,S$GLB,, | Performed by: PHYSICIAN ASSISTANT

## 2023-11-02 PROCEDURE — 87651 STREP A DNA AMP PROBE: CPT | Mod: QW,S$GLB,, | Performed by: PHYSICIAN ASSISTANT

## 2023-11-02 PROCEDURE — 87502 POCT INFLUENZA A/B MOLECULAR: ICD-10-PCS | Mod: QW,S$GLB,, | Performed by: PHYSICIAN ASSISTANT

## 2023-11-02 PROCEDURE — 87502 INFLUENZA DNA AMP PROBE: CPT | Mod: QW,S$GLB,, | Performed by: PHYSICIAN ASSISTANT

## 2023-11-02 PROCEDURE — 99214 OFFICE O/P EST MOD 30 MIN: CPT | Mod: S$GLB,,, | Performed by: PHYSICIAN ASSISTANT

## 2023-11-02 RX ORDER — PREDNISONE 10 MG/1
TABLET ORAL
Qty: 11 TABLET | Refills: 0 | Status: SHIPPED | OUTPATIENT
Start: 2023-11-02 | End: 2024-01-25

## 2023-11-02 RX ORDER — AZELASTINE 1 MG/ML
1 SPRAY, METERED NASAL 2 TIMES DAILY
Qty: 30 ML | Refills: 0 | Status: SHIPPED | OUTPATIENT
Start: 2023-11-02 | End: 2024-11-01

## 2023-11-02 NOTE — PROGRESS NOTES
"Subjective:      Patient ID: Aye Montanez is a 68 y.o. female.    Vitals:  height is 5' 7" (1.702 m). Her temperature is 98.9 °F (37.2 °C). Her blood pressure is 138/83 and her pulse is 87. Her respiration is 20 and oxygen saturation is 97%.     Chief Complaint: Cough    Patient presents today with complaints of sore throat, cough and ear pressure x 4 days. Patient is not taking anything OTC for her symptoms.     Sore Throat   This is a new problem. The current episode started in the past 7 days. There has been no fever. The pain is at a severity of 5/10. Associated symptoms include congestion, coughing and ear pain.   Otalgia   Associated symptoms include coughing and a sore throat.       Constitution: Positive for chills and fever (101 tmax).   HENT:  Positive for ear pain, congestion and sore throat.    Respiratory:  Positive for cough. Negative for wheezing and asthma.    Allergic/Immunologic: Negative for asthma.      Objective:     Physical Exam   Constitutional: She does not appear ill. No distress.   HENT:   Head: Normocephalic and atraumatic.   Ears:   Right Ear: External ear and ear canal normal. A middle ear effusion (clear) is present.   Left Ear: External ear and ear canal normal. A middle ear effusion (clear) is present.   Nose: Right sinus exhibits no maxillary sinus tenderness and no frontal sinus tenderness. Left sinus exhibits no maxillary sinus tenderness and no frontal sinus tenderness.   Mouth/Throat: Posterior oropharyngeal erythema (from PND) present.   Eyes: Conjunctivae are normal. Right eye exhibits no discharge. Left eye exhibits no discharge. Extraocular movement intact   Cardiovascular: Normal rate, regular rhythm and normal heart sounds.   No murmur heard.  Pulmonary/Chest: Effort normal and breath sounds normal. She has no wheezes. She has no rhonchi. She has no rales.   Abdominal: Normal appearance.   Musculoskeletal: Normal range of motion.         General: Normal range of motion. "   Neurological: no focal deficit. She is alert.   Skin: Skin is warm, dry and not pale. jaundice  Psychiatric: Her behavior is normal. Mood, judgment and thought content normal.   Nursing note and vitals reviewed.      Assessment:     1. Viral URI with cough    2. Sore throat    3. Cough, unspecified type    4. Ear pressure, bilateral        Plan:       Viral URI with cough    Sore throat  -     SARS Coronavirus 2 Antigen, POCT Manual Read  -     POCT Strep A, Molecular    Cough, unspecified type  -     SARS Coronavirus 2 Antigen, POCT Manual Read  -     POCT Influenza A/B MOLECULAR    Ear pressure, bilateral  -     SARS Coronavirus 2 Antigen, POCT Manual Read    Results for orders placed or performed in visit on 11/02/23   SARS Coronavirus 2 Antigen, POCT Manual Read   Result Value Ref Range    SARS Coronavirus 2 Antigen Negative Negative     Acceptable Yes    POCT Strep A, Molecular   Result Value Ref Range    Molecular Strep A, POC Negative Negative     Acceptable Yes    POCT Influenza A/B MOLECULAR   Result Value Ref Range    POC Molecular Influenza A Ag Negative Negative, Not Reported    POC Molecular Influenza B Ag Negative Negative, Not Reported     Acceptable Yes         Other orders  -     predniSONE (DELTASONE) 10 MG tablet; Take 40mg for 1 days, take 30mg for 1 days, take 20mg for 1 days, take 10mg for 2 days  Dispense: 11 tablet; Refill: 0  -     azelastine (ASTELIN) 137 mcg (0.1 %) nasal spray; 1 spray (137 mcg total) by Nasal route 2 (two) times daily.  Dispense: 30 mL; Refill: 0    Advised to also use Flonase and any other OTC treatments she wishes    Viral Upper Respiratory Infection Discharge Instructions, Adult   About this topic   You have an upper respiratory infection or URI. A URI can affect your nose, throat, ears, and sinuses. A virus is the cause of almost all URIs and antibiotics will not help you feel better more quickly. The common cold is an  example of a viral URI.  URIs are easy to spread from person to person, most often through coughing or sneezing. A URI will almost always get better in a week or two without any treatment.         What care is needed at home?   Ask your doctor what you need to do when you go home. Make sure you ask questions if you do not understand what the doctor says.  If you smoke, try to quit. Your doctor or nurse can help.  Drink lots of fluids like water, juice, or broth. This will help replace any fluids lost if you have a runny nose or fever. Warm tea or soup can help soothe a sore throat.  If the air in your home feels dry, use a cool mist humidifier. This can help a stuffy nose and make it easier to breathe.  You can also use saline nose drops to relieve stuffiness.  If you decide to take over-the-counter cough or cold medicines, follow the directions on the label carefully. Be sure you do not take more than 1 medicine that contains acetaminophen. Also, if you have a heart problem or high blood pressure, check with your doctor before you take any of these medicines.  Wash your hands often. Cough or sneeze into a tissue or your elbow instead of your hands. This will help keep others healthy.  What follow-up care is needed?   Your doctor may ask you to make visits to the office to check on your progress. Be sure to keep these visits.  What drugs may be needed?   The doctor may order drugs to:  Open up the tubes of your lungs  Treat viral infection  Relieve or stop coughing  Help with pain from a sore throat  Relieve runny and stuffy nose  Provide oxygen  Will physical activity be limited?   You need to rest for a few days to let your body recover from the infection.  What changes to diet are needed?   Eat soft foods like soup if swallowing is too painful.  What problems could happen?   Asthma attack  Sinus infections  Lung problems like pneumonia and bronchitis  Severe fluid loss. This is dehydration.  What can be done to  prevent this health problem?   Wash your hands often with soap and water for at least 20 seconds, especially after coughing or sneezing. Alcohol-based hand sanitizers also work to kill the virus.  If you are sick, cover your mouth and nose with tissue when you cough or sneeze. You can also cough into your elbow. Throw away tissues in the trash and wash your hands after touching used tissues.  Do not get too close (kissing, hugging) to people who are sick.  Do not share towels or hankies with anyone who is sick. Clean commonly handled things like door handles, remotes, toys, and phones. Wipe them with a disinfectant.  Stay away from crowded places.  Cover your nose and mouth when you sneeze or cough.  Take vitamin C to help build up your body's ability to fight disease.  Get a flu shot each year.  When do I need to call the doctor?   You have trouble breathing when talking or sitting still.  You have a fever of 100.4°F (38°C) or higher for several days, chills, a very bad sore throat, or ear or sinus pain.  You develop a new fever after several days of feeling the same or improving.  You develop chest pain when you cough.  You have a cough that lasts more than 10 days.  You cough up blood, or the color of the mucus you cough up changes.  Teach Back: Helping You Understand   The Teach Back Method helps you understand the information we are giving you. After you talk with the staff, tell them in your own words what you learned. This helps to make sure the staff has described each thing clearly. It also helps to explain things that may have been confusing. Before going home, make sure you can do these:  I can tell you about my condition.  I can tell you what may help ease my signs.  I can tell you what I will do if I have a fever, chills, breathing very fast, or trouble breathing.  Where can I learn more?   American Lung Association  https://www.lung.org/blog/can-you-exercise-with-a-cold   American Lung  Association  https://www.lung.org/lung-health-diseases/lung-disease-lookup/influenza/facts-about-the-common-cold   NHS Choices  https://www.nhs.uk/conditions/respiratory-tract-infection/   UpToDate  https://www.[employer].InsideTrack/contents/the-common-cold-in-adults-beyond-the-basics   Last Reviewed Date   2021-06-08  Consumer Information Use and Disclaimer   This information is not specific medical advice and does not replace information you receive from your health care provider. This is only a brief summary of general information. It does NOT include all information about conditions, illnesses, injuries, tests, procedures, treatments, therapies, discharge instructions or life-style choices that may apply to you. You must talk with your health care provider for complete information about your health and treatment options. This information should not be used to decide whether or not to accept your health care providers advice, instructions or recommendations. Only your health care provider has the knowledge and training to provide advice that is right for you.  Copyright   Copyright © 2021 UpToDate, Inc. and its affiliates and/or licensors. All rights reserved.

## 2023-11-07 ENCOUNTER — PATIENT MESSAGE (OUTPATIENT)
Dept: FAMILY MEDICINE | Facility: CLINIC | Age: 68
End: 2023-11-07
Payer: MEDICARE

## 2023-11-08 ENCOUNTER — TELEPHONE (OUTPATIENT)
Dept: FAMILY MEDICINE | Facility: CLINIC | Age: 68
End: 2023-11-08
Payer: MEDICARE

## 2023-11-08 NOTE — TELEPHONE ENCOUNTER
----- Message from Catalina Delgado sent at 11/8/2023 10:16 AM CST -----  Contact: self  Type: Needs Medical Advice  Who Called:  Patient    Best Call Back Number: [phone]    Additional Information: Pt state she would like to speak with office regarding her wheezing and straining to cough.Please call back and advise

## 2023-12-04 ENCOUNTER — TELEPHONE (OUTPATIENT)
Dept: RHEUMATOLOGY | Facility: CLINIC | Age: 68
End: 2023-12-04
Payer: MEDICARE

## 2023-12-04 NOTE — TELEPHONE ENCOUNTER
Patient saw Dr. Bee on 10/3/23. Per her note, seems that patient is on benlysta SC injections d/t infusion being too costly. Looks like at last note mentioned that patient may skip a few doses to see if she notices if helping.     Dr. Bee,  Is patient suppose to be holding Benlysta or ok to send refill?

## 2023-12-04 NOTE — TELEPHONE ENCOUNTER
----- Message from Elise Wilkes LPN sent at 12/4/2023  2:45 PM CST -----  Regarding: FW: refill  Contact: Sadie Saldaña this pt is asking for a refill. Is this an infusion or injection? Can you help?  ----- Message -----  From: Alissa Gallegos  Sent: 12/4/2023   1:01 PM CST  To: Rohit RODRÍGUEZ Staff  Subject: refill                                           Type:  RX Refill Request    Who Called: michelle Zhou or New Rx:refill  RX Name and Strength:Benlysta 200 mg  How is the patient currently taking it? (ex. 1XDay):1x  Is this a 30 day or 90 day RX:30  Preferred Pharmacy with phone number:  [facility]  [phone]  Fax [phone]  Local or Mail Order:melissa Bee  Ordering Provider:Rohit  Would the patient rather a call back or a response via [facility]? Call back  Best Call Back Number:[phone]    Additional Information: sts the pt needs a refill

## 2023-12-06 ENCOUNTER — PATIENT MESSAGE (OUTPATIENT)
Dept: RHEUMATOLOGY | Facility: CLINIC | Age: 68
End: 2023-12-06
Payer: MEDICARE

## 2023-12-06 DIAGNOSIS — M32.19 OTHER SYSTEMIC LUPUS ERYTHEMATOSUS WITH OTHER ORGAN INVOLVEMENT: Primary | ICD-10-CM

## 2023-12-06 NOTE — TELEPHONE ENCOUNTER
Dr. Bee,  Pt inquiring about Benlysta infusion refills.  Please review and advise.    Latoya Chavez MA  [facility]  12/6/2023

## 2023-12-06 NOTE — TELEPHONE ENCOUNTER
----- Message from Huan Gardner sent at 12/6/2023 12:14 PM CST -----  Type: Needs Medical Advice  Who Called:  Ale Gregory    Pharmacy name and phone #:    Best Call Back Number: [phone]  Additional Information: Caller states that he would like a callback regarding the status of the patient's refill:  Benlista

## 2023-12-07 NOTE — TELEPHONE ENCOUNTER
Call patient and review notes from Emma as well. Patient was not noting benefit with Benlysta SQ and plan at last visit was to hold for a while.   She has never been on infusion   Is she asking about want to try infusions to see if the cost is better or that she has noted a general worsening since we held the SQ Benlysta?      DR. Bee

## 2023-12-07 NOTE — TELEPHONE ENCOUNTER
Discussed with CAYLA Olivares who had question about another message from patient regarding Benlysta and reaching out to patient to discuss

## 2023-12-11 DIAGNOSIS — M32.19 OTHER SYSTEMIC LUPUS ERYTHEMATOSUS WITH OTHER ORGAN INVOLVEMENT: Primary | ICD-10-CM

## 2023-12-11 RX ORDER — BELIMUMAB 200 MG/ML
200 SOLUTION SUBCUTANEOUS
Qty: 4 ML | Refills: 11 | Status: ACTIVE | COMMUNITY
Start: 2023-12-11 | End: 2024-01-25

## 2023-12-11 RX ORDER — BELIMUMAB 200 MG/ML
200 SOLUTION SUBCUTANEOUS WEEKLY
Qty: 4 ML | Refills: 11 | Status: ACTIVE | OUTPATIENT
Start: 2023-12-11 | End: 2024-01-25

## 2023-12-11 RX ORDER — BELIMUMAB 200 MG/ML
200 SOLUTION SUBCUTANEOUS WEEKLY
Qty: 4 ML | Refills: 11 | Status: SHIPPED | OUTPATIENT
Start: 2023-12-11 | End: 2024-01-25

## 2023-12-11 RX ORDER — BELIMUMAB 200 MG/ML
200 SOLUTION SUBCUTANEOUS
Qty: 4 ML | Refills: 0 | Status: SHIPPED | OUTPATIENT
Start: 2023-12-11

## 2023-12-11 NOTE — TELEPHONE ENCOUNTER
Per [facility] pt needs PA and refill for Benlysta. Rx was sent to [facility] on today to have prior auth worked. Pt is still approved until 12/31/23 and hasn't received Dec medication will ask provider to sent refill to [facility] Benlysta also.

## 2023-12-12 ENCOUNTER — TELEPHONE (OUTPATIENT)
Dept: RHEUMATOLOGY | Facility: CLINIC | Age: 68
End: 2023-12-12
Payer: MEDICARE

## 2023-12-12 NOTE — TELEPHONE ENCOUNTER
Left message that Benlysta [place] received Benlysta refill Rx for Dec 2023. Pt needs to call to set up delivery.

## 2023-12-13 ENCOUNTER — TELEPHONE (OUTPATIENT)
Dept: RHEUMATOLOGY | Facility: CLINIC | Age: 68
End: 2023-12-13
Payer: MEDICARE

## 2023-12-13 NOTE — TELEPHONE ENCOUNTER
Duplicate- have spoken to [facility]sta [place] Pharmacist on two different occasions and clarified Benlysta Rx. Patient used the pen.

## 2023-12-13 NOTE — TELEPHONE ENCOUNTER
----- Message from Heather Brownjace sent at 12/13/2023 11:03 AM CST -----  Contact: Rell francis/ [facility] for [place]  Type:  Pharmacy Calling to Clarify an RX    Name of Caller:  Rell  Pharmacy Name:  [facility]    Prescription Name:  belimumab (BENLYSTA) 200 mg/mL AtIn  What do they need to clarify?:  send in as inj pen but pt uses usually syringes pleae clarify the change  Best Call Back Number:  [phone] Ref #[id number]  Additional Information:  thanks

## 2023-12-14 ENCOUNTER — PATIENT MESSAGE (OUTPATIENT)
Dept: PAIN MEDICINE | Facility: CLINIC | Age: 68
End: 2023-12-14
Payer: MEDICARE

## 2023-12-14 ENCOUNTER — TELEPHONE (OUTPATIENT)
Dept: RHEUMATOLOGY | Facility: CLINIC | Age: 68
End: 2023-12-14
Payer: MEDICARE

## 2023-12-14 ENCOUNTER — PATIENT MESSAGE (OUTPATIENT)
Dept: ORTHOPEDICS | Facility: CLINIC | Age: 68
End: 2023-12-14
Payer: MEDICARE

## 2023-12-14 ENCOUNTER — PATIENT MESSAGE (OUTPATIENT)
Dept: NEUROLOGY | Facility: CLINIC | Age: 68
End: 2023-12-14
Payer: MEDICARE

## 2023-12-14 ENCOUNTER — PATIENT MESSAGE (OUTPATIENT)
Dept: FAMILY MEDICINE | Facility: CLINIC | Age: 68
End: 2023-12-14
Payer: MEDICARE

## 2023-12-14 NOTE — TELEPHONE ENCOUNTER
Spoke to Ben[facility] Pharmacist on 12/13/23 to clarify that patient does use Benlysta Pen.

## 2023-12-18 ENCOUNTER — TELEPHONE (OUTPATIENT)
Dept: FAMILY MEDICINE | Facility: CLINIC | Age: 68
End: 2023-12-18
Payer: MEDICARE

## 2023-12-18 NOTE — TELEPHONE ENCOUNTER
Transferred patient to main line to update insurance     So we can check to see if it is accepting.     Was advised that we would be out of net work

## 2023-12-21 ENCOUNTER — PATIENT MESSAGE (OUTPATIENT)
Dept: RHEUMATOLOGY | Facility: CLINIC | Age: 68
End: 2023-12-21
Payer: MEDICARE

## 2023-12-28 DIAGNOSIS — M54.9 DORSALGIA: ICD-10-CM

## 2023-12-28 RX ORDER — TRAMADOL HYDROCHLORIDE 50 MG/1
50 TABLET ORAL EVERY 6 HOURS PRN
Qty: 21 TABLET | Refills: 0 | Status: SHIPPED | OUTPATIENT
Start: 2023-12-28

## 2024-01-08 ENCOUNTER — PATIENT MESSAGE (OUTPATIENT)
Dept: NEUROLOGY | Facility: CLINIC | Age: 69
End: 2024-01-08
Payer: MEDICARE

## 2024-01-23 ENCOUNTER — TELEPHONE (OUTPATIENT)
Dept: RHEUMATOLOGY | Facility: CLINIC | Age: 69
End: 2024-01-23
Payer: MEDICARE

## 2024-01-23 NOTE — TELEPHONE ENCOUNTER
----- Message from John Martinez sent at 1/23/2024  2:22 PM CST -----  Contact: Mario  Type:  Pharmacy Calling to Clarify an RX    Name of Caller:  [facility]  Pharmacy Name:      [facility] - [place], [place] - [address]  [address]  [facility] [address]  Phone: [phone] Fax: [phone]        Prescription Name:  belimumab (BENLYSTA) 200 mg/mL AtIn  What do they need to clarify?:  need to clarify pre-filled syringe or autoinjector

## 2024-01-24 ENCOUNTER — PATIENT MESSAGE (OUTPATIENT)
Dept: RHEUMATOLOGY | Facility: CLINIC | Age: 69
End: 2024-01-24
Payer: MEDICARE

## 2024-01-24 ENCOUNTER — TELEPHONE (OUTPATIENT)
Dept: RHEUMATOLOGY | Facility: CLINIC | Age: 69
End: 2024-01-24
Payer: MEDICARE

## 2024-01-24 NOTE — TELEPHONE ENCOUNTER
----- Message from Emma Saldaña PharmD sent at 1/23/2024  6:41 PM CST -----  Contact: Niyah  Seems that patient uses pen, but can you call patient to ask her if she uses the pen or syringe?    ----- Message -----  From: Elise Wilkes LPN  Sent: 1/23/2024   2:53 PM CST  To: Emma Saldaña PharmD    Can you clarify if patient is using the pen or syringe?  ----- Message -----  From: John Martinez  Sent: 1/23/2024   2:25 PM CST  To: Rohit RODRÍGUEZ Staff    Type:  Pharmacy Calling to Clarify an RX    Name of Caller:  [facility]  Pharmacy Name:      [facility] - [place], [place] - [address] AT [address]  [address]  [facility] [address]  Phone: [phone] Fax: [phone]        Prescription Name:  belimumab (BENLYSTA) 200 mg/mL AtIn  What do they need to clarify?:  need to clarify pre-filled syringe or autoinjector

## 2024-01-25 DIAGNOSIS — M54.12 CERVICAL RADICULOPATHY: ICD-10-CM

## 2024-01-25 RX ORDER — DULOXETIN HYDROCHLORIDE 30 MG/1
CAPSULE, DELAYED RELEASE ORAL
Qty: 60 CAPSULE | Refills: 3 | Status: SHIPPED | OUTPATIENT
Start: 2024-01-25

## 2024-02-01 ENCOUNTER — TELEPHONE (OUTPATIENT)
Dept: RHEUMATOLOGY | Facility: CLINIC | Age: 69
End: 2024-02-01
Payer: MEDICARE

## 2024-04-09 PROBLEM — R04.0 EPISTAXIS: Status: RESOLVED | Noted: 2018-04-11 | Resolved: 2024-04-09

## 2024-04-09 PROBLEM — R40.4 TRANSIENT ALTERATION OF AWARENESS: Status: RESOLVED | Noted: 2021-01-27 | Resolved: 2024-04-09
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
## 2024-04-09 PROBLEM — J30.2 PERENNIAL ALLERGIC RHINITIS WITH SEASONAL VARIATION: Status: RESOLVED | Noted: 2017-07-05 | Resolved: 2024-04-09

## 2024-04-09 PROBLEM — I70.0 ATHEROSCLEROSIS OF AORTA: Status: RESOLVED | Noted: 2017-10-11 | Resolved: 2024-04-09

## 2024-04-09 PROBLEM — M54.2 NECK PAIN: Status: RESOLVED | Noted: 2021-06-28 | Resolved: 2024-04-09

## 2024-04-09 PROBLEM — E66.09 CLASS 1 OBESITY DUE TO EXCESS CALORIES WITH SERIOUS COMORBIDITY AND BODY MASS INDEX (BMI) OF 33.0 TO 33.9 IN ADULT: Status: RESOLVED | Noted: 2020-09-29 | Resolved: 2024-04-09

## 2024-04-09 PROBLEM — R29.6 FALLS: Status: RESOLVED | Noted: 2022-07-27 | Resolved: 2024-04-09

## 2024-04-09 PROBLEM — R94.6 NONSPECIFIC ABNORMAL RESULTS OF THYROID FUNCTION STUDY: Status: RESOLVED | Noted: 2018-11-01 | Resolved: 2024-04-09

## 2024-04-09 PROBLEM — J30.89 PERENNIAL ALLERGIC RHINITIS WITH SEASONAL VARIATION: Status: RESOLVED | Noted: 2017-07-05 | Resolved: 2024-04-09

## 2024-04-09 PROBLEM — R20.2 PARESTHESIAS: Status: RESOLVED | Noted: 2021-06-28 | Resolved: 2024-04-09

## 2024-04-09 PROBLEM — E66.811 CLASS 1 OBESITY DUE TO EXCESS CALORIES WITH SERIOUS COMORBIDITY AND BODY MASS INDEX (BMI) OF 33.0 TO 33.9 IN ADULT: Status: RESOLVED | Noted: 2020-09-29 | Resolved: 2024-04-09

## 2024-05-17 PROBLEM — Z01.818 PRE-OPERATIVE CLEARANCE: Status: ACTIVE | Noted: 2024-05-17

## 2024-08-21 PROBLEM — N81.10 BLADDER PROLAPSE, FEMALE, ACQUIRED: Status: ACTIVE | Noted: 2024-08-21

## 2024-08-21 PROBLEM — R03.0 ELEVATED BP WITHOUT DIAGNOSIS OF HYPERTENSION: Status: ACTIVE | Noted: 2024-08-21

## 2024-08-21 PROBLEM — F32.A CHRONIC DEPRESSION: Status: ACTIVE | Noted: 2024-08-21

## 2024-08-21 PROBLEM — R23.3 EASY BRUISING: Status: ACTIVE | Noted: 2024-08-21

## 2024-08-21 PROBLEM — Z01.818 PRE-OPERATIVE CLEARANCE: Status: RESOLVED | Noted: 2024-05-17 | Resolved: 2024-08-21

## 2024-11-01 PROBLEM — L03.113 CELLULITIS OF RIGHT HAND: Status: ACTIVE | Noted: 2024-11-01

## 2024-11-22 ENCOUNTER — PATIENT MESSAGE (OUTPATIENT)
Dept: PODIATRY | Facility: CLINIC | Age: 69
End: 2024-11-22
Payer: MEDICARE

## 2025-01-09 PROBLEM — E78.5 DYSLIPIDEMIA: Status: ACTIVE | Noted: 2025-01-09

## 2025-01-09 PROBLEM — J84.9 ILD (INTERSTITIAL LUNG DISEASE): Status: ACTIVE | Noted: 2025-01-09

## 2025-01-09 PROBLEM — M48.02 CERVICAL STENOSIS OF SPINAL CANAL: Status: ACTIVE | Noted: 2025-01-09

## 2025-01-09 PROBLEM — M50.30 DDD (DEGENERATIVE DISC DISEASE), CERVICAL: Status: ACTIVE | Noted: 2025-01-09

## 2025-01-09 PROBLEM — M51.369 DEGENERATION OF INTERVERTEBRAL DISC OF LUMBAR REGION: Status: ACTIVE | Noted: 2025-01-09

## 2025-01-09 PROBLEM — N81.89 PELVIC FLOOR WEAKNESS IN FEMALE: Status: ACTIVE | Noted: 2025-01-09

## 2025-01-09 PROBLEM — Z98.890 HISTORY OF LUMBAR DISCECTOMY: Status: ACTIVE | Noted: 2025-01-09

## 2025-01-09 PROBLEM — N88.2 CERVICAL STENOSIS (UTERINE CERVIX): Status: ACTIVE | Noted: 2025-01-09

## 2025-01-09 PROBLEM — I50.9 CHRONIC CONGESTIVE HEART FAILURE, UNSPECIFIED HEART FAILURE TYPE: Status: ACTIVE | Noted: 2025-01-09

## 2025-01-09 PROBLEM — E66.01 SEVERE OBESITY (BMI 35.0-39.9) WITH COMORBIDITY: Status: ACTIVE | Noted: 2025-01-09

## 2025-03-28 ENCOUNTER — PATIENT MESSAGE (OUTPATIENT)
Dept: RHEUMATOLOGY | Facility: CLINIC | Age: 70
End: 2025-03-28
Payer: MEDICARE

## 2025-03-28 DIAGNOSIS — R53.83 MALAISE AND FATIGUE: ICD-10-CM

## 2025-03-28 DIAGNOSIS — M35.00 SJOGREN'S SYNDROME WITHOUT EXTRAGLANDULAR INVOLVEMENT: ICD-10-CM

## 2025-03-28 DIAGNOSIS — D84.821 IMMUNOSUPPRESSION DUE TO DRUG THERAPY: ICD-10-CM

## 2025-03-28 DIAGNOSIS — Z79.899 IMMUNOSUPPRESSION DUE TO DRUG THERAPY: ICD-10-CM

## 2025-03-28 DIAGNOSIS — R53.81 MALAISE AND FATIGUE: ICD-10-CM

## 2025-03-28 DIAGNOSIS — M32.19 OTHER SYSTEMIC LUPUS ERYTHEMATOSUS WITH OTHER ORGAN INVOLVEMENT: Primary | ICD-10-CM

## 2025-04-01 NOTE — TELEPHONE ENCOUNTER
Not seen since 2023 really not sure all the labs I will need. If biologics (Benlysta still needing to be filled) need Hepatitis and TB    Otherwise standard Lupus labs.

## 2025-04-04 ENCOUNTER — OFFICE VISIT (OUTPATIENT)
Dept: RHEUMATOLOGY | Facility: CLINIC | Age: 70
End: 2025-04-04
Payer: MEDICARE

## 2025-04-04 VITALS
SYSTOLIC BLOOD PRESSURE: 125 MMHG | HEART RATE: 70 BPM | WEIGHT: 227.06 LBS | RESPIRATION RATE: 18 BRPM | DIASTOLIC BLOOD PRESSURE: 79 MMHG | HEIGHT: 64 IN | BODY MASS INDEX: 38.76 KG/M2

## 2025-04-04 DIAGNOSIS — I73.00 RAYNAUD'S DISEASE WITHOUT GANGRENE: ICD-10-CM

## 2025-04-04 DIAGNOSIS — M32.19 OTHER SYSTEMIC LUPUS ERYTHEMATOSUS WITH OTHER ORGAN INVOLVEMENT: Primary | ICD-10-CM

## 2025-04-04 DIAGNOSIS — M35.00 SJOGREN'S SYNDROME WITHOUT EXTRAGLANDULAR INVOLVEMENT: ICD-10-CM

## 2025-04-04 PROCEDURE — 99999 PR PBB SHADOW E&M-EST. PATIENT-LVL IV: CPT | Mod: PBBFAC,,, | Performed by: INTERNAL MEDICINE

## 2025-04-04 RX ORDER — BELIMUMAB 200 MG/ML
200 SOLUTION SUBCUTANEOUS
Qty: 4 ML | Refills: 11 | Status: ACTIVE | OUTPATIENT
Start: 2025-04-04 | End: 2026-04-04

## 2025-04-04 NOTE — PROGRESS NOTES
"Final draft pending.   Subjective:          Chief Complaint: Aye Montanez is a 70 y.o. female who had concerns including Follow-up.    HPI: SLE/Sjogrens she meets ACR criteria for both + hx of thrombocytopenia, + leukopenia, +polyarthritis, +OSMAN 1:1280 speckled, +pleurisy    4/2025:  All events:  Benlysta infusion in past too costly  Started Benlysta SQ 5/13/22 and continues. Last dosage 12/2024.   Since then pain is 10/10 hands, legs (shin pain), right knee still with pain limiting motion in extension  Shoulders b/l, ankles more than toes.   Noting significant worsening since off Benlysta    She continues to nicole dry eyes     Started Cymbalta was started which has helped with her neve pain right face, she notes many of her random "zingers" in her body are lessened.     Since start Benlysta feet, ankles, hands and wrist are better. Less swelling, less stiffness , less pain. And tolerating. Still with fatigue, still with very dry eyes that are inflamed.       She failed MTX  HCQ was ? But reviewed Meng last note I have and she stopped HCQ 2017 but no notation of maculopathy  Patient is having oligoarticular swelling in hands and feet. She is having peripheral polyarthritis. Severe dryness of her eyes.   She did clarify that Dr. Fan did not see maculopathy but only expressed his concerns about the eye.       Rheum Hx:   Patient with Sjogren syndrome (estimate start 2015)  with SLE approx 2089-5594.   +OSMAN 1:1280 speckled  High titer SSA and SSB with + dry eye and dry mouth  dsDNA neg, Singh neg, RNP neg. C3 and C4 WNL.   + hx of thrombocytopenia, + leukopenia, +polyarthritis, + sicca, +Raynauds  RF and CCP negative.   She has c/o no strength in her hands, knees are painful. Feet burn with standing. No overt swelling of joints with the exception of left voler wrist synovial cyst.    Diffuse pruritis w/o visible rash.   Trialed HCQ but patient concerned about macula and discontinued. She is followed by Dr." Meng the [facility].  She was taken off HCQ no active maculopathy but she was concerned about ASE. .   Trialed on MTX and failed.  Restasis despite burning back on  +punctal plugs. Did try Xiidra- ? If ever taken this visit.  She notes burning in her eyes.  Photophobia occasional redness to her eyes. burning Restasis no longer tolerable.    Never salagen/Evozac. . She did try xylimelts      Patient developed neurologic events starting 1/2021- but we discontinued nifedipine (Raynaud's) in event this was leading to hypotension. Patient described episode  with disorientation, tingling lips.  repeat episode 5/25/21 with vertigo and near syncope and again 6/2/21 slurred speech, right facial droop, Imagin has been neg. for acute changes. on 6/3/21 similar event but now on left. 8/2021 another episode.   Dr. Mccurdy performed an EMG which showed mild left C6 radiculopathy. MRI showed multilevel degenerative changes and a cyst at C6/7 on the right.   EEG 2o21 WNL.   Neuro dx: complex migrain vs TIA changes ASA to Plavix.   right leg numbness referred to vestibular therapy.   HA: consider occipital nerve block  Cervical spine and left arm symptoms Pain management. trial with Tramadol for now and medrol and if not improved will discuss SONYA once safe to hold Plavix. She elected not to take Lyrica. no reported ASE.     Patient has been having SOB, dizzyness. she has maximized therapy with Pulm with little improvement. Her last PFTs show no diffusion impairment, not hypoxic   PFT Results  02/26/2019: FEV1 2.20 (89%), FEV1/FVC 85, TLC 93%, DLCO 111%    Acute Pleurisy 12/2021 with right chest pain and pain with inspiration. COVID testing neg. Prednisone and Levaquin started. +productive cough.   : CTA neg for PE. but reticulonodular opacities compatible with pneumonitis ANGELIQUE and LLL with patchy infiltrate in the RML and RLL. Trace right pleural effusion.   Seen with Pulm as of 1/12/22 given azithro   Cardiac: ECHO normal  EF, no shunt, The estimated PA systolic pressure is 35 mmHg  completed stress testing with Dr. Peter ([facility]) pt states no new changes. +CAD with stenting 2017.      Component      Latest Ref Rng & Units 8/16/2018   Anti Sm Antibody      0.00 - 19.99 EU 2.85   Anti-Sm Interpretation      Negative Negative   Anti-SSA Antibody      0.00 - 19.99 .02 (H)   Anti-SSA Interpretation      Negative Positive (A)   Anti-SSB Antibody      0.00 - 19.99 .37 (H)   Anti-SSB Interpretation      Negative Positive (A)   Sed Rate      0 - 20 mm/Hr 33 (H)   CRP      0.0 - 8.2 mg/L 3.6   OSMAN Screen      Negative <1:160 Positive (A)   Complement (C-3)      50 - 180 mg/dL 129   Complement (C-4)      11 - 44 mg/dL 22   Complement,Total, Serum      42 - 95 U/mL 85   Rheumatoid Factor      0.0 - 15.0 IU/mL <10.0   CCP Antibodies      <5.0 U/mL <0.5     REVIEW OF SYSTEMS:    Review of Systems   Constitutional:  Positive for malaise/fatigue. Negative for fever and weight loss.   HENT:  Negative for sore throat.    Eyes:  Negative for double vision, photophobia and redness.   Respiratory:  Negative for cough, shortness of breath and wheezing.    Cardiovascular:  Negative for chest pain, palpitations and orthopnea.   Gastrointestinal:  Negative for abdominal pain, constipation and diarrhea.   Genitourinary:  Negative for dysuria, hematuria and urgency.   Musculoskeletal:  Positive for joint pain. Negative for back pain and myalgias.   Skin:  Positive for itching. Negative for rash.   Neurological:  Negative for dizziness, tingling, focal weakness and headaches.   Endo/Heme/Allergies:  Does not bruise/bleed easily.   Psychiatric/Behavioral:  Negative for depression, hallucinations and suicidal ideas.                Objective:            Past Medical History:   Diagnosis Date    Cervical spinal stenosis     Chronic bronchitis     Coronary artery disease     GERD (gastroesophageal reflux disease)     History of transient ischemic attack  (TIA)     NUMEROUS    Interstitial lung disease     chronic shortness of breath    Mixed hyperlipidemia     Raynaud's syndrome without gangrene     Respiratory distress     after anes due to lung disease and Sjogren's    Sjogren's syndrome 12/05/2016    Systemic lupus erythematosus, organ or system involvement unspecified     Thyroid disease      Family History   Problem Relation Name Age of Onset    Cancer Mother      Ovarian cancer Mother  40    Cancer Father          esophageal    Esophageal cancer Father      Lung cancer Maternal Grandmother      Stroke Maternal Aunt      Rheum arthritis Maternal Aunt      Lung cancer Maternal Aunt      Pancreatic cancer Maternal Aunt      Rheum arthritis Paternal Uncle      Breast cancer Maternal Cousin      Breast cancer Maternal Cousin      Glaucoma Neg Hx      Macular degeneration Neg Hx       Social History     Tobacco Use    Smoking status: Never     Passive exposure: Never    Smokeless tobacco: Never   Substance Use Topics    Alcohol use: Yes     Comment: rarely    Drug use: No         Current Outpatient Medications on File Prior to Visit   Medication Sig Dispense Refill    acetaminophen (TYLENOL) 500 MG tablet Take 2 tablets (1,000 mg total) by mouth every 6 (six) hours as needed for Pain. 60 tablet 1    acetaminophen (TYLENOL) 650 MG TbSR Take 1,300 mg by mouth every 8 (eight) hours.      albuterol (ACCUNEB) 0.63 mg/3 mL Nebu Take 0.63 mg by nebulization every 6 (six) hours as needed. Rescue      alendronate (FOSAMAX) 70 MG tablet Take 1 tablet (70 mg total) by mouth every 7 days. 12 tablet 3    antiox #8/om3/dha/epa/lut/zeax (PRESERVISION AREDS 2, OMEGA-3, ORAL) Take 1 tablet by mouth 2 (two) times a day.      clopidogreL (PLAVIX) 75 mg tablet Take 75 mg by mouth once daily.      DULoxetine (CYMBALTA) 30 MG capsule Take 1 capsule (30 mg total) by mouth once daily. 90 capsule 3    ergocalciferol (ERGOCALCIFEROL) 50,000 unit Cap Take 1 capsule (50,000 Units total) by  mouth every 7 days. 12 capsule 3    fluconazole (DIFLUCAN) 150 MG Tab Take 1 by mouth weekly if needed for yeast infection 2 tablet 0    fluticasone-umeclidin-vilanter (TRELEGY ELLIPTA) 100-62.5-25 mcg DsDv Inhale 1 puff into the lungs once daily. 90 each 3    furosemide (LASIX) 20 MG tablet Take 1 tablet (20 mg total) by mouth once daily.      gabapentin (NEURONTIN) 300 MG capsule Take 300 mg by mouth 2 (two) times daily as needed.      levothyroxine (SYNTHROID) 25 MCG tablet Take 1 tablet (25 mcg total) by mouth before breakfast. 90 tablet 3    magnesium oxide (MAG-OX) 400 mg (241.3 mg magnesium) tablet Take 400 mg by mouth every evening.      omeprazole (PRILOSEC) 20 MG capsule Take 1 capsule (20 mg total) by mouth once daily. 90 capsule 3    peg 400-propylene glycol, PF, (SYSTANE, PF,) 0.4-0.3 % Dpet Apply 1 drop to eye 4 (four) times daily as needed.      potassium chloride SA (K-DUR,KLOR-CON) 20 MEQ tablet TAKE 1 TABLET(20 MEQ) BY MOUTH EVERY DAY 90 tablet 1    rosuvastatin (CRESTOR) 40 MG Tab Take 1 tablet (40 mg total) by mouth every evening. 90 tablet 3     No current facility-administered medications on file prior to visit.       Vitals:    04/04/25 1033   BP: 125/79   Pulse: 70   Resp: 18       Physical Exam:    Physical Exam  Constitutional:       Appearance: Normal appearance. She is well-developed.   HENT:      Nose: No septal deviation.      Mouth/Throat:      Mouth: No oral lesions.   Eyes:      Conjunctiva/sclera:      Right eye: Right conjunctiva is not injected.      Left eye: Left conjunctiva is not injected.      Pupils: Pupils are equal, round, and reactive to light.   Neck:      Thyroid: No thyroid mass or thyromegaly.      Vascular: No JVD.   Cardiovascular:      Rate and Rhythm: Normal rate and regular rhythm.      Pulses: Normal pulses.      Comments: No edema  Pulmonary:      Effort: Pulmonary effort is normal.      Breath sounds: Normal breath sounds.   Abdominal:      Palpations: Abdomen  is soft.   Musculoskeletal:      Right shoulder: Tenderness present. No swelling. Normal range of motion.      Left shoulder: Tenderness present. No swelling. Normal range of motion.      Right elbow: No swelling. Normal range of motion. No tenderness.      Left elbow: No swelling. Normal range of motion. No tenderness.      Right wrist: Tenderness present. No swelling. Normal range of motion.      Left wrist: Tenderness present. No swelling. Normal range of motion.      Right hand: Tenderness present.      Left hand: Tenderness present.      Right hip: Normal range of motion. Normal strength.      Left hip: No tenderness. Normal range of motion.      Right knee: No swelling. Normal range of motion. No tenderness.      Left knee: No swelling. Normal range of motion. No tenderness.      Right ankle: No swelling. No tenderness. Normal range of motion.      Left ankle: No swelling. No tenderness. Normal range of motion.      Comments: Patient has some tenderness on the right MCP no overt synovitis no deformities.  No evidence of Raynaud's today but historical triphasic color change  She has marked crepitation bilateral knees with limitation in flexion she has full extension no laxity within the joint no active effusion.      Lymphadenopathy:      Cervical: No cervical adenopathy.   Skin:     General: Skin is dry.   Neurological:      Deep Tendon Reflexes: Reflexes are normal and symmetric.           Assessment:       Encounter Diagnoses   Name Primary?    Other systemic lupus erythematosus with other organ involvement Yes    Sjogren's syndrome without extraglandular involvement     Raynaud's disease without gangrene               Plan:        Other systemic lupus erythematosus with other organ involvement  -     belimumab (BENLYSTA) 200 mg/mL AtIn; Inject 1 mL (200 mg total) into the skin every 7 days.  Dispense: 4 mL; Refill: 11  -     C-Reactive Protein; Future; Expected date: 04/04/2025  -     C4 Complement; Future;  "Expected date: 04/04/2025  -     Anti-DNA Ab, Double-Stranded; Future; Expected date: 04/04/2025  -     C3 Complement; Future; Expected date: 04/04/2025  -     Sedimentation rate; Future; Expected date: 04/04/2025  -     CBC Auto Differential; Future; Expected date: 04/04/2025  -     Comprehensive Metabolic Panel; Future; Expected date: 04/04/2025  -     Direct antiglobulin test; Future; Expected date: 04/04/2025    Sjogren's syndrome without extraglandular involvement  -     C-Reactive Protein; Future; Expected date: 04/04/2025  -     C4 Complement; Future; Expected date: 04/04/2025  -     Anti-DNA Ab, Double-Stranded; Future; Expected date: 04/04/2025  -     C3 Complement; Future; Expected date: 04/04/2025  -     Sedimentation rate; Future; Expected date: 04/04/2025  -     CBC Auto Differential; Future; Expected date: 04/04/2025  -     Comprehensive Metabolic Panel; Future; Expected date: 04/04/2025  -     Direct antiglobulin test; Future; Expected date: 04/04/2025    Raynaud's disease without gangrene      Very pleasant 66-year-old female SLE/ Sjogren's bulk of her complaint is keratoconjunctivitis, until 2022 with joint pain.    We have monitored SANDOVAL/SOB ? Some ILD-CTD.    She was hesitant to try salagen.   Failed MTX with ASE   Discussed the joints, increasing leucopenia, worsening dry eyes, malaise and fatigue. And recent pleurisy unclear if this was infectious but chronic SOB/SANDOVAL despite maximized therapy.    HCQ may need to be re-visited. No confirmed maculopathy it was a rec from ophtho that the drug is a risk. I need to reconsider if this is an option for her.    We started Benlysta 5/2022- present and initially she noted a benefit but she is not sure now if this is helping at all. We are going to skip a few weeks and see if she appreciates a difference.   Quantiferon gold. Is negative.     KCS: continue with dry eyes.     Neuropathy: "zingers" are not following single distribution and we have attributed " some to FMS she sees Neurology about this as well   Cymbalta has helped with this.    Gabapentin we discussed she did take a dose from her  with no adverse event, holding for now.     Follow up in about 6 months (around 10/4/2025).        F/u 6 months  Thank you for allowing me to participate in the care of this very pleasant patient.    40min consultation with greater than 50% of that time included Preparing to see the patient (review records, tests), Obtaining and/or reviewing separately obtained historical data, Performing a medically appropriate examination and/or evaluation , Ordering medications, tests, and/or procedures, Referring and communicating with other healthcare professionals , Documenting clinical information in the electronic or other health record and Independently interpreting results  (as warranted) & communicating results to the patient/family/caregiver. All questions answered.        Addendum: ok to fill Benlysta.   T spot negative 4/2025 \

## 2025-04-28 DIAGNOSIS — M32.19 OTHER SYSTEMIC LUPUS ERYTHEMATOSUS WITH OTHER ORGAN INVOLVEMENT: Primary | ICD-10-CM

## 2025-04-29 RX ORDER — BELIMUMAB 200 MG/ML
200 SOLUTION SUBCUTANEOUS WEEKLY
Qty: 4 ML | Refills: 11 | Status: ACTIVE | OUTPATIENT
Start: 2025-04-29 | End: 2026-04-29

## 2025-05-19 PROBLEM — L03.113 CELLULITIS OF RIGHT HAND: Status: RESOLVED | Noted: 2024-11-01 | Resolved: 2025-05-19

## 2025-05-19 PROBLEM — I50.9 CHRONIC CONGESTIVE HEART FAILURE, UNSPECIFIED HEART FAILURE TYPE: Status: RESOLVED | Noted: 2025-01-09 | Resolved: 2025-05-19

## 2025-05-19 PROBLEM — F32.A CHRONIC DEPRESSION: Status: RESOLVED | Noted: 2024-08-21 | Resolved: 2025-05-19

## 2025-05-19 PROBLEM — E78.5 DYSLIPIDEMIA: Status: RESOLVED | Noted: 2025-01-09 | Resolved: 2025-05-19

## 2025-05-19 PROBLEM — R23.3 EASY BRUISING: Status: RESOLVED | Noted: 2024-08-21 | Resolved: 2025-05-19

## 2025-05-19 PROBLEM — R03.0 ELEVATED BP WITHOUT DIAGNOSIS OF HYPERTENSION: Status: RESOLVED | Noted: 2024-08-21 | Resolved: 2025-05-19

## 2025-05-19 PROBLEM — F32.4 MAJOR DEPRESSIVE DISORDER WITH SINGLE EPISODE, IN PARTIAL REMISSION: Status: ACTIVE | Noted: 2023-02-02

## 2025-05-25 ENCOUNTER — PATIENT MESSAGE (OUTPATIENT)
Dept: RHEUMATOLOGY | Facility: CLINIC | Age: 70
End: 2025-05-25
Payer: MEDICARE

## 2025-05-26 DIAGNOSIS — M32.19 OTHER SYSTEMIC LUPUS ERYTHEMATOSUS WITH OTHER ORGAN INVOLVEMENT: ICD-10-CM

## 2025-05-26 RX ORDER — BELIMUMAB 200 MG/ML
200 SOLUTION SUBCUTANEOUS WEEKLY
Qty: 4 ML | Refills: 11 | Status: ACTIVE | OUTPATIENT
Start: 2025-05-26 | End: 2026-05-26

## 2025-05-26 NOTE — TELEPHONE ENCOUNTER
Pharmacy requesting refill on Benlysta 200mg  Pt's LOV 04/04/2025  Pt's NOV 10/06/2025  Medication pending

## (undated) DEVICE — BLADE SAW 16.0MM X 7.0MM

## (undated) DEVICE — SEE MEDLINE ITEM 146270

## (undated) DEVICE — K-WIRE TRCR PT1.6MM DIA 150MM
Type: IMPLANTABLE DEVICE | Site: FOOT | Status: NON-FUNCTIONAL
Removed: 2017-08-16

## (undated) DEVICE — PENCIL ROCKER SWITCH 10FT CORD

## (undated) DEVICE — Device

## (undated) DEVICE — TRAY NERVE BLOCK

## (undated) DEVICE — GLOVE 7.5 PROTEXIS PI MICRO

## (undated) DEVICE — SPLINT PLASTER FAST SET 5X30IN

## (undated) DEVICE — DRESSING XEROFORM FOIL PK 1X8

## (undated) DEVICE — SUT ETHILON 3-0 PS2 18 BLK

## (undated) DEVICE — DURAPREP SURG SCRUB 26ML

## (undated) DEVICE — APPLICATOR CHLORAPREP CLR 10.5

## (undated) DEVICE — SOL 9P NACL IRR PIC IL

## (undated) DEVICE — PADDING CAST 4IN SPECIALIST

## (undated) DEVICE — LINER SUCTION 3000CC

## (undated) DEVICE — SEE MEDLINE ITEM 146313

## (undated) DEVICE — DRAPE EXTREMITY W/ABC NON-SLIP

## (undated) DEVICE — SEE MEDLINE ITEM 146231

## (undated) DEVICE — BLADE SURG #15 CARBON STEEL

## (undated) DEVICE — DRAPE C-ARM FOR MOBILE XRAY

## (undated) DEVICE — SUT MONOCRYL 3-0 SH U/D

## (undated) DEVICE — GAUZE SPONGE 8X4 12 PLY

## (undated) DEVICE — BALL PIN JURGAN GREEN 3/8 DIA

## (undated) DEVICE — UNDERGLOVE BIOGEL PI SZ 6.5 LF

## (undated) DEVICE — BLADE MEDIUM 9MM X 25MM

## (undated) DEVICE — GLOVE SURGEONS ULTRA TOUCH 6.5

## (undated) DEVICE — NDL SAFETY 21G X 1 1/2 ECLPSE

## (undated) DEVICE — SYR 10CC LUER LOCK

## (undated) DEVICE — SEE MEDLINE ITEM 152622

## (undated) DEVICE — SUT MONOCRYL 4-0 SH UND MON

## (undated) DEVICE — SUT MONOCRYL 2-0 S UND

## (undated) DEVICE — SOL SOD CHLORIDE 0.9% 10ML

## (undated) DEVICE — DRAPE STERI U-SHAPED 47X51IN

## (undated) DEVICE — SEE MEDLINE ITEM 157131

## (undated) DEVICE — PAD CAST SPECIALIST STRL 6

## (undated) DEVICE — SEE MEDLINE ITEM 157117

## (undated) DEVICE — SYR GLASS 5CC LUER LOK

## (undated) DEVICE — SEE MEDLINE ITEM 146308

## (undated) DEVICE — ELECTRODE REM PLYHSV RETURN 9

## (undated) DEVICE — ALCOHOL 70% ISOP RUBBING 4OZ

## (undated) DEVICE — LOOP VESSEL YELLOW MAXI

## (undated) DEVICE — BANDAGE ESMARK 6X12

## (undated) DEVICE — TOURNIQUET SB QC SP 24X4IN

## (undated) DEVICE — PAD ABD 8X10 STERILE